# Patient Record
Sex: MALE | Race: WHITE | NOT HISPANIC OR LATINO | Employment: UNEMPLOYED | URBAN - METROPOLITAN AREA
[De-identification: names, ages, dates, MRNs, and addresses within clinical notes are randomized per-mention and may not be internally consistent; named-entity substitution may affect disease eponyms.]

---

## 2017-01-04 ENCOUNTER — HOSPITAL ENCOUNTER (EMERGENCY)
Facility: HOSPITAL | Age: 32
Discharge: HOME | End: 2017-01-04
Attending: EMERGENCY MEDICINE
Payer: COMMERCIAL

## 2017-01-04 VITALS
HEART RATE: 73 BPM | DIASTOLIC BLOOD PRESSURE: 74 MMHG | OXYGEN SATURATION: 98 % | TEMPERATURE: 98 F | RESPIRATION RATE: 16 BRPM | WEIGHT: 160 LBS | BODY MASS INDEX: 21.67 KG/M2 | HEIGHT: 72 IN | SYSTOLIC BLOOD PRESSURE: 128 MMHG

## 2017-01-04 DIAGNOSIS — R07.89 ATYPICAL CHEST PAIN: Primary | ICD-10-CM

## 2017-01-04 PROCEDURE — 99285 EMERGENCY DEPT VISIT HI MDM: CPT

## 2017-01-04 PROCEDURE — 93005 ELECTROCARDIOGRAM TRACING: CPT

## 2017-01-05 LAB
ATRIAL RATE: 79 BPM
P AXIS: 52 DEGREES
PR INTERVAL: 130 MS
QRS AXIS: 86 DEGREES
QRSD INTERVAL: 84 MS
QT INTERVAL: 402 MS
QTC INTERVAL: 460 MS
T WAVE AXIS: 72 DEGREES
VENTRICULAR RATE: 79 BPM

## 2017-01-23 ENCOUNTER — APPOINTMENT (EMERGENCY)
Dept: RADIOLOGY | Facility: HOSPITAL | Age: 32
End: 2017-01-23
Payer: COMMERCIAL

## 2017-01-23 ENCOUNTER — HOSPITAL ENCOUNTER (EMERGENCY)
Facility: HOSPITAL | Age: 32
Discharge: HOME | End: 2017-01-25
Attending: EMERGENCY MEDICINE | Admitting: EMERGENCY MEDICINE
Payer: COMMERCIAL

## 2017-01-23 DIAGNOSIS — F19.10 DRUG ABUSE (HCC): ICD-10-CM

## 2017-01-23 DIAGNOSIS — R45.851 SUICIDAL IDEATION: Primary | ICD-10-CM

## 2017-01-23 LAB
AMPHETAMINES SERPL QL SCN: NEGATIVE
ANION GAP SERPL CALCULATED.3IONS-SCNC: 9 MMOL/L (ref 4–13)
BARBITURATES UR QL: NEGATIVE
BASOPHILS # BLD AUTO: 0 THOUSANDS/ΜL (ref 0–0.1)
BASOPHILS NFR BLD AUTO: 1 % (ref 0–1)
BENZODIAZ UR QL: NEGATIVE
BUN SERPL-MCNC: 13 MG/DL (ref 5–25)
CALCIUM SERPL-MCNC: 8.4 MG/DL (ref 8.3–10.1)
CHLORIDE SERPL-SCNC: 104 MMOL/L (ref 100–108)
CO2 SERPL-SCNC: 30 MMOL/L (ref 21–32)
COCAINE UR QL: POSITIVE
CREAT SERPL-MCNC: 0.84 MG/DL (ref 0.6–1.3)
EOSINOPHIL # BLD AUTO: 0.2 THOUSAND/ΜL (ref 0–0.61)
EOSINOPHIL NFR BLD AUTO: 3 % (ref 0–6)
ERYTHROCYTE [DISTWIDTH] IN BLOOD BY AUTOMATED COUNT: 13.4 % (ref 11.6–15.1)
ETHANOL SERPL-MCNC: <3 MG/DL (ref 0–3)
GFR SERPL CREATININE-BSD FRML MDRD: >60 ML/MIN/1.73SQ M
GLUCOSE SERPL-MCNC: 108 MG/DL (ref 65–140)
HCT VFR BLD AUTO: 34.3 % (ref 42–52)
HGB BLD-MCNC: 11.4 G/DL (ref 14–18)
LYMPHOCYTES # BLD AUTO: 2.1 THOUSANDS/ΜL (ref 0.6–4.47)
LYMPHOCYTES NFR BLD AUTO: 39 % (ref 14–44)
MCH RBC QN AUTO: 29.4 PG (ref 27–31)
MCHC RBC AUTO-ENTMCNC: 33.1 G/DL (ref 31.4–37.4)
MCV RBC AUTO: 89 FL (ref 82–98)
METHADONE UR QL: NEGATIVE
MONOCYTES # BLD AUTO: 0.4 THOUSAND/ΜL (ref 0.17–1.22)
MONOCYTES NFR BLD AUTO: 8 % (ref 4–12)
NEUTROPHILS # BLD AUTO: 2.6 THOUSANDS/ΜL (ref 1.85–7.62)
NEUTS SEG NFR BLD AUTO: 49 % (ref 43–75)
NRBC BLD AUTO-RTO: 0 /100 WBCS
OPIATES UR QL SCN: POSITIVE
PCP UR QL: NEGATIVE
PLATELET # BLD AUTO: 167 THOUSANDS/UL (ref 130–400)
PMV BLD AUTO: 8.4 FL (ref 8.9–12.7)
POTASSIUM SERPL-SCNC: 3.6 MMOL/L (ref 3.5–5.3)
RBC # BLD AUTO: 3.86 MILLION/UL (ref 4.7–6.1)
SODIUM SERPL-SCNC: 143 MMOL/L (ref 136–145)
THC UR QL: NEGATIVE
WBC # BLD AUTO: 5.3 THOUSAND/UL (ref 4.8–10.8)

## 2017-01-23 PROCEDURE — 80320 DRUG SCREEN QUANTALCOHOLS: CPT | Performed by: EMERGENCY MEDICINE

## 2017-01-23 PROCEDURE — 93005 ELECTROCARDIOGRAM TRACING: CPT | Performed by: EMERGENCY MEDICINE

## 2017-01-23 PROCEDURE — 71020 HB CHEST X-RAY 2VW FRONTAL&LATL: CPT

## 2017-01-23 PROCEDURE — 36415 COLL VENOUS BLD VENIPUNCTURE: CPT | Performed by: EMERGENCY MEDICINE

## 2017-01-23 PROCEDURE — 85025 COMPLETE CBC W/AUTO DIFF WBC: CPT | Performed by: EMERGENCY MEDICINE

## 2017-01-23 PROCEDURE — 80307 DRUG TEST PRSMV CHEM ANLYZR: CPT | Performed by: EMERGENCY MEDICINE

## 2017-01-23 PROCEDURE — 80048 BASIC METABOLIC PNL TOTAL CA: CPT | Performed by: EMERGENCY MEDICINE

## 2017-01-23 RX ORDER — NICOTINE 21 MG/24HR
PATCH, TRANSDERMAL 24 HOURS TRANSDERMAL
Status: COMPLETED
Start: 2017-01-23 | End: 2017-01-24

## 2017-01-23 RX ORDER — NICOTINE 21 MG/24HR
21 PATCH, TRANSDERMAL 24 HOURS TRANSDERMAL ONCE
Status: COMPLETED | OUTPATIENT
Start: 2017-01-23 | End: 2017-01-24

## 2017-01-23 RX ADMIN — Medication 21 MG: at 19:25

## 2017-01-23 RX ADMIN — CLONIDINE 0.3 MG: 0.3 PATCH, EXTENDED RELEASE TRANSDERMAL at 19:21

## 2017-01-23 RX ADMIN — NICOTINE 21 MG: 21 PATCH, EXTENDED RELEASE TRANSDERMAL at 19:25

## 2017-01-24 LAB
ATRIAL RATE: 90 BPM
P AXIS: 56 DEGREES
PR INTERVAL: 138 MS
QRS AXIS: 76 DEGREES
QRSD INTERVAL: 82 MS
QT INTERVAL: 364 MS
QTC INTERVAL: 445 MS
T WAVE AXIS: 63 DEGREES
VENTRICULAR RATE: 90 BPM

## 2017-01-24 RX ORDER — NICOTINE 21 MG/24HR
21 PATCH, TRANSDERMAL 24 HOURS TRANSDERMAL ONCE
Status: DISCONTINUED | OUTPATIENT
Start: 2017-01-24 | End: 2017-01-25 | Stop reason: HOSPADM

## 2017-01-24 RX ADMIN — NICOTINE 21 MG: 21 PATCH, EXTENDED RELEASE TRANSDERMAL at 22:20

## 2017-01-25 VITALS
HEART RATE: 72 BPM | SYSTOLIC BLOOD PRESSURE: 132 MMHG | BODY MASS INDEX: 22.35 KG/M2 | WEIGHT: 165 LBS | DIASTOLIC BLOOD PRESSURE: 85 MMHG | TEMPERATURE: 97.5 F | OXYGEN SATURATION: 98 % | HEIGHT: 72 IN | RESPIRATION RATE: 18 BRPM

## 2017-01-25 PROCEDURE — 99285 EMERGENCY DEPT VISIT HI MDM: CPT

## 2017-02-15 ENCOUNTER — HOSPITAL ENCOUNTER (EMERGENCY)
Facility: HOSPITAL | Age: 32
Discharge: HOME | End: 2017-02-15
Admitting: EMERGENCY MEDICINE
Payer: COMMERCIAL

## 2017-02-15 VITALS
BODY MASS INDEX: 25.09 KG/M2 | WEIGHT: 185 LBS | TEMPERATURE: 98.2 F | HEART RATE: 100 BPM | RESPIRATION RATE: 16 BRPM | DIASTOLIC BLOOD PRESSURE: 87 MMHG | SYSTOLIC BLOOD PRESSURE: 135 MMHG | OXYGEN SATURATION: 98 %

## 2017-02-15 DIAGNOSIS — S02.5XXA TOOTH FRACTURE: Primary | ICD-10-CM

## 2017-02-15 PROCEDURE — 99281 EMR DPT VST MAYX REQ PHY/QHP: CPT

## 2017-02-15 RX ORDER — NAPROXEN 500 MG/1
500 TABLET ORAL 2 TIMES DAILY WITH MEALS
Qty: 20 TABLET | Refills: 0 | Status: SHIPPED | OUTPATIENT
Start: 2017-02-15 | End: 2017-08-03 | Stop reason: ALTCHOICE

## 2017-02-15 RX ORDER — CLINDAMYCIN HYDROCHLORIDE 300 MG/1
300 CAPSULE ORAL 3 TIMES DAILY
Qty: 21 CAPSULE | Refills: 0 | Status: SHIPPED | OUTPATIENT
Start: 2017-02-15 | End: 2017-02-22

## 2017-02-15 RX ORDER — TRAMADOL HYDROCHLORIDE 50 MG/1
50 TABLET ORAL EVERY 8 HOURS PRN
Qty: 15 TABLET | Refills: 0 | Status: SHIPPED | OUTPATIENT
Start: 2017-02-15 | End: 2017-02-20

## 2017-03-01 ENCOUNTER — HOSPITAL ENCOUNTER (EMERGENCY)
Facility: HOSPITAL | Age: 32
Discharge: HOME | End: 2017-03-01
Attending: EMERGENCY MEDICINE | Admitting: EMERGENCY MEDICINE
Payer: COMMERCIAL

## 2017-03-01 ENCOUNTER — APPOINTMENT (EMERGENCY)
Dept: RADIOLOGY | Facility: HOSPITAL | Age: 32
End: 2017-03-01
Payer: COMMERCIAL

## 2017-03-01 VITALS
HEIGHT: 72 IN | TEMPERATURE: 97.9 F | DIASTOLIC BLOOD PRESSURE: 82 MMHG | OXYGEN SATURATION: 98 % | BODY MASS INDEX: 24.38 KG/M2 | SYSTOLIC BLOOD PRESSURE: 148 MMHG | WEIGHT: 180 LBS | RESPIRATION RATE: 18 BRPM | HEART RATE: 84 BPM

## 2017-03-01 DIAGNOSIS — R10.9 ABDOMINAL PAIN: Primary | ICD-10-CM

## 2017-03-01 DIAGNOSIS — K59.00 CONSTIPATION: ICD-10-CM

## 2017-03-01 LAB
ALBUMIN SERPL BCP-MCNC: 4.6 G/DL (ref 3.5–5)
ALP SERPL-CCNC: 55 U/L (ref 46–116)
ALT SERPL W P-5'-P-CCNC: 130 U/L (ref 12–78)
ANION GAP SERPL CALCULATED.3IONS-SCNC: 12 MMOL/L (ref 4–13)
AST SERPL W P-5'-P-CCNC: 48 U/L (ref 5–45)
BASOPHILS # BLD AUTO: 0.1 THOUSANDS/ΜL (ref 0–0.1)
BASOPHILS NFR BLD AUTO: 2 % (ref 0–1)
BILIRUB SERPL-MCNC: 0.7 MG/DL (ref 0.2–1)
BUN SERPL-MCNC: 15 MG/DL (ref 5–25)
CALCIUM SERPL-MCNC: 9.2 MG/DL (ref 8.3–10.1)
CHLORIDE SERPL-SCNC: 102 MMOL/L (ref 100–108)
CLARITY, POC: CLEAR
CO2 SERPL-SCNC: 29 MMOL/L (ref 21–32)
COLOR, POC: YELLOW
CREAT SERPL-MCNC: 1.02 MG/DL (ref 0.6–1.3)
EOSINOPHIL # BLD AUTO: 0.1 THOUSAND/ΜL (ref 0–0.61)
EOSINOPHIL NFR BLD AUTO: 1 % (ref 0–6)
ERYTHROCYTE [DISTWIDTH] IN BLOOD BY AUTOMATED COUNT: 13.8 % (ref 11.6–15.1)
EXT BLOOD URINE: NEGATIVE
EXT GLUCOSE, UA: NORMAL
EXT KETONES: NEGATIVE
EXT NITRITE, UA: NEGATIVE
EXT PH, UA: 6
EXT PROTEIN, UA: NEGATIVE
GFR SERPL CREATININE-BSD FRML MDRD: >60 ML/MIN/1.73SQ M
GLUCOSE SERPL-MCNC: 95 MG/DL (ref 65–140)
HCT VFR BLD AUTO: 40.5 % (ref 42–52)
HGB BLD-MCNC: 13.6 G/DL (ref 14–18)
LACTATE SERPL-SCNC: 0.7 MMOL/L (ref 0.5–2)
LYMPHOCYTES # BLD AUTO: 2.1 THOUSANDS/ΜL (ref 0.6–4.47)
LYMPHOCYTES NFR BLD AUTO: 25 % (ref 14–44)
MCH RBC QN AUTO: 30.1 PG (ref 27–31)
MCHC RBC AUTO-ENTMCNC: 33.6 G/DL (ref 31.4–37.4)
MCV RBC AUTO: 90 FL (ref 82–98)
MONOCYTES # BLD AUTO: 0.5 THOUSAND/ΜL (ref 0.17–1.22)
MONOCYTES NFR BLD AUTO: 6 % (ref 4–12)
NEUTROPHILS # BLD AUTO: 5.6 THOUSANDS/ΜL (ref 1.85–7.62)
NEUTS SEG NFR BLD AUTO: 67 % (ref 43–75)
PLATELET # BLD AUTO: 208 THOUSANDS/UL (ref 130–400)
PMV BLD AUTO: 7.9 FL (ref 8.9–12.7)
POTASSIUM SERPL-SCNC: 3.5 MMOL/L (ref 3.5–5.3)
PROT SERPL-MCNC: 7.7 G/DL (ref 6.4–8.2)
RBC # BLD AUTO: 4.53 MILLION/UL (ref 4.7–6.1)
SODIUM SERPL-SCNC: 143 MMOL/L (ref 136–145)
WBC # BLD AUTO: 8.4 THOUSAND/UL (ref 4.8–10.8)
WBC # BLD EST: NEGATIVE 10*3/UL

## 2017-03-01 PROCEDURE — 81002 URINALYSIS NONAUTO W/O SCOPE: CPT | Performed by: EMERGENCY MEDICINE

## 2017-03-01 PROCEDURE — 96376 TX/PRO/DX INJ SAME DRUG ADON: CPT

## 2017-03-01 PROCEDURE — 83605 ASSAY OF LACTIC ACID: CPT | Performed by: EMERGENCY MEDICINE

## 2017-03-01 PROCEDURE — 74177 CT ABD & PELVIS W/CONTRAST: CPT

## 2017-03-01 PROCEDURE — 85025 COMPLETE CBC W/AUTO DIFF WBC: CPT | Performed by: EMERGENCY MEDICINE

## 2017-03-01 PROCEDURE — 96375 TX/PRO/DX INJ NEW DRUG ADDON: CPT

## 2017-03-01 PROCEDURE — 80053 COMPREHEN METABOLIC PANEL: CPT | Performed by: EMERGENCY MEDICINE

## 2017-03-01 PROCEDURE — 96374 THER/PROPH/DIAG INJ IV PUSH: CPT

## 2017-03-01 PROCEDURE — 74176 CT ABD & PELVIS W/O CONTRAST: CPT

## 2017-03-01 PROCEDURE — 99284 EMERGENCY DEPT VISIT MOD MDM: CPT

## 2017-03-01 PROCEDURE — 36415 COLL VENOUS BLD VENIPUNCTURE: CPT | Performed by: EMERGENCY MEDICINE

## 2017-03-01 RX ORDER — ONDANSETRON 2 MG/ML
4 INJECTION INTRAMUSCULAR; INTRAVENOUS ONCE
Status: COMPLETED | OUTPATIENT
Start: 2017-03-01 | End: 2017-03-01

## 2017-03-01 RX ORDER — POLYETHYLENE GLYCOL 3350 17 G/17G
17 POWDER, FOR SOLUTION ORAL DAILY
Qty: 510 G | Refills: 0 | Status: SHIPPED | OUTPATIENT
Start: 2017-03-01 | End: 2017-08-03 | Stop reason: ALTCHOICE

## 2017-03-01 RX ORDER — KETOROLAC TROMETHAMINE 30 MG/ML
30 INJECTION, SOLUTION INTRAMUSCULAR; INTRAVENOUS ONCE
Status: COMPLETED | OUTPATIENT
Start: 2017-03-01 | End: 2017-03-01

## 2017-03-01 RX ORDER — HYDROCHLOROTHIAZIDE 25 MG/1
25 TABLET ORAL DAILY
COMMUNITY
End: 2017-08-03 | Stop reason: HOSPADM

## 2017-03-01 RX ORDER — MORPHINE SULFATE 4 MG/ML
4 INJECTION, SOLUTION INTRAMUSCULAR; INTRAVENOUS ONCE
Status: COMPLETED | OUTPATIENT
Start: 2017-03-01 | End: 2017-03-01

## 2017-03-01 RX ADMIN — HYDROMORPHONE HYDROCHLORIDE 1 MG: 1 INJECTION, SOLUTION INTRAMUSCULAR; INTRAVENOUS; SUBCUTANEOUS at 03:30

## 2017-03-01 RX ADMIN — KETOROLAC TROMETHAMINE 30 MG: 30 INJECTION, SOLUTION INTRAMUSCULAR at 02:04

## 2017-03-01 RX ADMIN — MORPHINE SULFATE 4 MG: 4 INJECTION, SOLUTION INTRAMUSCULAR; INTRAVENOUS at 02:57

## 2017-03-01 RX ADMIN — ONDANSETRON 4 MG: 2 INJECTION INTRAMUSCULAR; INTRAVENOUS at 02:04

## 2017-03-01 RX ADMIN — IOHEXOL 100 ML: 350 INJECTION, SOLUTION INTRAVENOUS at 05:41

## 2017-03-01 RX ADMIN — ONDANSETRON 4 MG: 2 INJECTION INTRAMUSCULAR; INTRAVENOUS at 03:30

## 2017-03-01 RX ADMIN — IOHEXOL 50 ML: 240 INJECTION, SOLUTION INTRATHECAL; INTRAVASCULAR; INTRAVENOUS; ORAL at 03:41

## 2017-03-05 ENCOUNTER — HOSPITAL ENCOUNTER (EMERGENCY)
Facility: HOSPITAL | Age: 32
Discharge: HOME | End: 2017-03-05
Admitting: EMERGENCY MEDICINE
Payer: COMMERCIAL

## 2017-03-05 VITALS
HEART RATE: 115 BPM | SYSTOLIC BLOOD PRESSURE: 143 MMHG | TEMPERATURE: 96.5 F | OXYGEN SATURATION: 100 % | BODY MASS INDEX: 24.41 KG/M2 | RESPIRATION RATE: 20 BRPM | DIASTOLIC BLOOD PRESSURE: 93 MMHG | WEIGHT: 180 LBS

## 2017-03-05 DIAGNOSIS — S02.5XXA: Primary | ICD-10-CM

## 2017-03-05 PROCEDURE — 99282 EMERGENCY DEPT VISIT SF MDM: CPT

## 2017-03-05 RX ORDER — NAPROXEN 500 MG/1
500 TABLET ORAL 2 TIMES DAILY WITH MEALS
Qty: 20 TABLET | Refills: 0 | Status: SHIPPED | OUTPATIENT
Start: 2017-03-05 | End: 2017-08-03 | Stop reason: ALTCHOICE

## 2017-03-05 RX ORDER — TRAMADOL HYDROCHLORIDE 50 MG/1
50 TABLET ORAL EVERY 8 HOURS PRN
Qty: 15 TABLET | Refills: 0 | Status: SHIPPED | OUTPATIENT
Start: 2017-03-05 | End: 2017-03-10

## 2017-03-05 RX ORDER — CLINDAMYCIN HYDROCHLORIDE 300 MG/1
300 CAPSULE ORAL 3 TIMES DAILY
Qty: 30 CAPSULE | Refills: 0 | Status: SHIPPED | OUTPATIENT
Start: 2017-03-05 | End: 2017-03-15

## 2017-03-07 ENCOUNTER — ALLSCRIPTS OFFICE VISIT (OUTPATIENT)
Dept: OTHER | Facility: OTHER | Age: 32
End: 2017-03-07

## 2017-04-07 ENCOUNTER — GENERIC CONVERSION - ENCOUNTER (OUTPATIENT)
Dept: OTHER | Facility: OTHER | Age: 32
End: 2017-04-07

## 2017-08-03 ENCOUNTER — HOSPITAL ENCOUNTER (EMERGENCY)
Facility: HOSPITAL | Age: 32
Discharge: HOME | End: 2017-08-03
Attending: EMERGENCY MEDICINE | Admitting: EMERGENCY MEDICINE
Payer: COMMERCIAL

## 2017-08-03 ENCOUNTER — APPOINTMENT (EMERGENCY)
Dept: RADIOLOGY | Facility: HOSPITAL | Age: 32
End: 2017-08-03
Payer: COMMERCIAL

## 2017-08-03 VITALS
HEART RATE: 61 BPM | TEMPERATURE: 98 F | DIASTOLIC BLOOD PRESSURE: 72 MMHG | BODY MASS INDEX: 23.7 KG/M2 | RESPIRATION RATE: 18 BRPM | SYSTOLIC BLOOD PRESSURE: 130 MMHG | HEIGHT: 72 IN | OXYGEN SATURATION: 97 % | WEIGHT: 175 LBS

## 2017-08-03 DIAGNOSIS — R07.9 CHEST PAIN: Primary | ICD-10-CM

## 2017-08-03 LAB
ANION GAP SERPL CALCULATED.3IONS-SCNC: 8 MMOL/L (ref 4–13)
APTT PPP: 26 SECONDS (ref 24–33)
BASOPHILS # BLD AUTO: 0 THOUSANDS/ΜL (ref 0–0.1)
BASOPHILS NFR BLD AUTO: 0 % (ref 0–1)
BUN SERPL-MCNC: 10 MG/DL (ref 5–25)
CALCIUM SERPL-MCNC: 9.3 MG/DL (ref 8.3–10.1)
CHLORIDE SERPL-SCNC: 104 MMOL/L (ref 100–108)
CO2 SERPL-SCNC: 30 MMOL/L (ref 21–32)
CREAT SERPL-MCNC: 0.77 MG/DL (ref 0.6–1.3)
DEPRECATED D DIMER PPP: <190 NG/ML (FEU) (ref 190–520)
EOSINOPHIL # BLD AUTO: 0.1 THOUSAND/ΜL (ref 0–0.61)
EOSINOPHIL NFR BLD AUTO: 2 % (ref 0–6)
ERYTHROCYTE [DISTWIDTH] IN BLOOD BY AUTOMATED COUNT: 13.3 % (ref 11.6–15.1)
GFR SERPL CREATININE-BSD FRML MDRD: 122 ML/MIN/1.73SQ M
GLUCOSE SERPL-MCNC: 103 MG/DL (ref 65–140)
HCT VFR BLD AUTO: 39.5 % (ref 42–52)
HGB BLD-MCNC: 13.2 G/DL (ref 14–18)
INR PPP: 1.06 (ref 0.86–1.16)
LYMPHOCYTES # BLD AUTO: 1.1 THOUSANDS/ΜL (ref 0.6–4.47)
LYMPHOCYTES NFR BLD AUTO: 19 % (ref 14–44)
MCH RBC QN AUTO: 30.2 PG (ref 27–31)
MCHC RBC AUTO-ENTMCNC: 33.5 G/DL (ref 31.4–37.4)
MCV RBC AUTO: 90 FL (ref 82–98)
MONOCYTES # BLD AUTO: 0.3 THOUSAND/ΜL (ref 0.17–1.22)
MONOCYTES NFR BLD AUTO: 6 % (ref 4–12)
NEUTROPHILS # BLD AUTO: 4.3 THOUSANDS/ΜL (ref 1.85–7.62)
NEUTS SEG NFR BLD AUTO: 73 % (ref 43–75)
NRBC BLD AUTO-RTO: 0 /100 WBCS
PLATELET # BLD AUTO: 157 THOUSANDS/UL (ref 130–400)
PMV BLD AUTO: 8 FL (ref 8.9–12.7)
POTASSIUM SERPL-SCNC: 4.8 MMOL/L (ref 3.5–5.3)
PROTHROMBIN TIME: 11.1 SECONDS (ref 9.4–11.7)
RBC # BLD AUTO: 4.38 MILLION/UL (ref 4.7–6.1)
SODIUM SERPL-SCNC: 142 MMOL/L (ref 136–145)
TROPONIN I SERPL-MCNC: <0.02 NG/ML
TROPONIN I SERPL-MCNC: <0.02 NG/ML
WBC # BLD AUTO: 5.9 THOUSAND/UL (ref 4.8–10.8)

## 2017-08-03 PROCEDURE — 36415 COLL VENOUS BLD VENIPUNCTURE: CPT | Performed by: EMERGENCY MEDICINE

## 2017-08-03 PROCEDURE — 93005 ELECTROCARDIOGRAM TRACING: CPT

## 2017-08-03 PROCEDURE — 85379 FIBRIN DEGRADATION QUANT: CPT | Performed by: EMERGENCY MEDICINE

## 2017-08-03 PROCEDURE — 85610 PROTHROMBIN TIME: CPT | Performed by: EMERGENCY MEDICINE

## 2017-08-03 PROCEDURE — 80048 BASIC METABOLIC PNL TOTAL CA: CPT | Performed by: EMERGENCY MEDICINE

## 2017-08-03 PROCEDURE — 99285 EMERGENCY DEPT VISIT HI MDM: CPT

## 2017-08-03 PROCEDURE — 85025 COMPLETE CBC W/AUTO DIFF WBC: CPT | Performed by: EMERGENCY MEDICINE

## 2017-08-03 PROCEDURE — 84484 ASSAY OF TROPONIN QUANT: CPT | Performed by: EMERGENCY MEDICINE

## 2017-08-03 PROCEDURE — 85730 THROMBOPLASTIN TIME PARTIAL: CPT | Performed by: EMERGENCY MEDICINE

## 2017-08-03 PROCEDURE — 71010 HB CHEST X-RAY 1 VIEW FRONTAL (PORTABLE): CPT

## 2017-08-03 RX ORDER — METHADONE HYDROCHLORIDE 10 MG/ML
100 CONCENTRATE ORAL DAILY
COMMUNITY
End: 2019-07-11

## 2017-08-03 RX ORDER — ACETAMINOPHEN 325 MG/1
650 TABLET ORAL ONCE
Status: COMPLETED | OUTPATIENT
Start: 2017-08-03 | End: 2017-08-03

## 2017-08-03 RX ADMIN — ACETAMINOPHEN 650 MG: 325 TABLET, FILM COATED ORAL at 17:27

## 2017-08-09 LAB
ATRIAL RATE: 67 BPM
P AXIS: 63 DEGREES
PR INTERVAL: 144 MS
QRS AXIS: 80 DEGREES
QRSD INTERVAL: 90 MS
QT INTERVAL: 432 MS
QTC INTERVAL: 456 MS
T WAVE AXIS: 67 DEGREES
VENTRICULAR RATE: 67 BPM

## 2017-08-18 ENCOUNTER — GENERIC CONVERSION - ENCOUNTER (OUTPATIENT)
Dept: OTHER | Facility: OTHER | Age: 32
End: 2017-08-18

## 2017-11-22 ENCOUNTER — GENERIC CONVERSION - ENCOUNTER (OUTPATIENT)
Dept: OTHER | Facility: OTHER | Age: 32
End: 2017-11-22

## 2018-01-13 VITALS
OXYGEN SATURATION: 97 % | WEIGHT: 177 LBS | SYSTOLIC BLOOD PRESSURE: 116 MMHG | TEMPERATURE: 98.6 F | DIASTOLIC BLOOD PRESSURE: 62 MMHG | BODY MASS INDEX: 23.98 KG/M2 | HEIGHT: 72 IN | RESPIRATION RATE: 18 BRPM | HEART RATE: 73 BPM

## 2018-01-15 NOTE — MISCELLANEOUS
Provider Comments  Provider Comments:   Attempted to call pt re: missed apt   No answering machine to L/M  CE      Signatures   Electronically signed by : Kim Kilpatrick MD; Aug 18 2017  1:52PM EST                       (Author)

## 2018-01-17 NOTE — MISCELLANEOUS
Provider Comments  Provider Comments:   called to resched not able to leave message  lr      Signatures   Electronically signed by : FRANSISCO Khan ; Apr 12 2017  9:04PM EST                       (Author)

## 2018-01-22 VITALS
TEMPERATURE: 97.2 F | HEART RATE: 85 BPM | OXYGEN SATURATION: 97 % | WEIGHT: 173 LBS | BODY MASS INDEX: 23.43 KG/M2 | RESPIRATION RATE: 18 BRPM | SYSTOLIC BLOOD PRESSURE: 108 MMHG | DIASTOLIC BLOOD PRESSURE: 60 MMHG | HEIGHT: 72 IN

## 2018-09-11 ENCOUNTER — HOSPITAL ENCOUNTER (EMERGENCY)
Facility: HOSPITAL | Age: 33
Discharge: HOME | End: 2018-09-11
Attending: EMERGENCY MEDICINE | Admitting: EMERGENCY MEDICINE
Payer: COMMERCIAL

## 2018-09-11 VITALS
HEART RATE: 94 BPM | OXYGEN SATURATION: 99 % | SYSTOLIC BLOOD PRESSURE: 136 MMHG | RESPIRATION RATE: 16 BRPM | WEIGHT: 168 LBS | DIASTOLIC BLOOD PRESSURE: 77 MMHG | TEMPERATURE: 98.6 F | BODY MASS INDEX: 22.78 KG/M2

## 2018-09-11 DIAGNOSIS — Z76.0 MEDICATION REFILL: Primary | ICD-10-CM

## 2018-09-11 PROCEDURE — 99281 EMR DPT VST MAYX REQ PHY/QHP: CPT

## 2018-09-11 RX ORDER — QUETIAPINE FUMARATE 25 MG/1
50 TABLET, FILM COATED ORAL ONCE
Status: COMPLETED | OUTPATIENT
Start: 2018-09-11 | End: 2018-09-11

## 2018-09-11 RX ORDER — BUPROPION HYDROCHLORIDE 100 MG/1
100 TABLET ORAL 2 TIMES DAILY
Qty: 28 TABLET | Refills: 0 | Status: SHIPPED | OUTPATIENT
Start: 2018-09-11 | End: 2019-07-11

## 2018-09-11 RX ORDER — QUETIAPINE FUMARATE 50 MG/1
50 TABLET, FILM COATED ORAL
Qty: 14 TABLET | Refills: 0 | Status: SHIPPED | OUTPATIENT
Start: 2018-09-11 | End: 2019-07-11

## 2018-09-11 RX ORDER — BUPROPION HYDROCHLORIDE 100 MG/1
100 TABLET ORAL ONCE
Status: COMPLETED | OUTPATIENT
Start: 2018-09-11 | End: 2018-09-11

## 2018-09-11 RX ADMIN — BUPROPION HYDROCHLORIDE 100 MG: 100 TABLET, FILM COATED ORAL at 19:00

## 2018-09-11 RX ADMIN — QUETIAPINE FUMARATE 50 MG: 25 TABLET ORAL at 19:00

## 2018-09-11 NOTE — ED PROVIDER NOTES
History  Chief Complaint   Patient presents with    Medication Refill     Patient states he got out of [facility] about 1 week ago, ws on wellbutrin and seraquel at the [facility] but doesn't have any     Patient with history of ADHD, psychiatric disorders presents for evaluation for medication refill  Patient discharged from [facility] roughly 4 days ago  Sent home without medications  Denies any new or changing symptoms  Endorses mild anxiety but no suicidal homicidal ideation  No aggravating or alleviating factors  Denies any physical complaints  History provided by:  Patient and relative   used: No        Prior to Admission Medications   Prescriptions Last Dose Informant Patient Reported? Taking? methadone (DOLOPHINE) 10 mg/mL oral concentrated solution   Yes No   Sig: Take 100 mg by mouth daily      Facility-Administered Medications: None       Past Medical History:   Diagnosis Date    ADHD (attention deficit hyperactivity disorder)     Hypertension     Inguinal hernia     Psychiatric disorder     anxiety, depression, social personality diorder       History reviewed  No pertinent surgical history  History reviewed  No pertinent family history  I have reviewed and agree with the history as documented  Social History   Substance Use Topics    Smoking status: Current Every Day Smoker     Packs/day: 1 00     Types: Cigarettes    Smokeless tobacco: Never Used    Alcohol use No        Review of Systems   Constitutional: Negative for activity change, appetite change, chills, fatigue and fever  HENT: Negative for congestion, dental problem, facial swelling, sore throat, tinnitus and trouble swallowing  Eyes: Negative for pain, discharge and itching  Respiratory: Negative for apnea, chest tightness and wheezing  Cardiovascular: Negative for chest pain, palpitations and leg swelling  Gastrointestinal: Negative for abdominal pain and nausea     Genitourinary: Negative for difficulty urinating, dysuria and flank pain  Musculoskeletal: Negative for arthralgias, back pain, gait problem, joint swelling and neck pain  Skin: Negative for color change, rash and wound  Neurological: Negative for dizziness and facial asymmetry  Psychiatric/Behavioral: Negative for agitation and behavioral problems  The patient is not nervous/anxious  All other systems reviewed and are negative  Physical Exam  Physical Exam   Constitutional: He is oriented to person, place, and time  He appears well-developed and well-nourished  No distress  HENT:   Head: Normocephalic and atraumatic  Right Ear: External ear normal    Left Ear: External ear normal    Eyes: EOM are normal  Pupils are equal, round, and reactive to light  Right eye exhibits no discharge  Left eye exhibits no discharge  Neck: Normal range of motion  Neck supple  No tracheal deviation present  No thyromegaly present  Cardiovascular: Normal rate and regular rhythm  No murmur heard  Pulmonary/Chest: Effort normal and breath sounds normal    Abdominal: Soft  Bowel sounds are normal  He exhibits no distension  There is no tenderness  Musculoskeletal: Normal range of motion  He exhibits no edema or deformity  Neurological: He is alert and oriented to person, place, and time  No cranial nerve deficit  He exhibits normal muscle tone  Skin: Skin is warm  Capillary refill takes less than 2 seconds  He is not diaphoretic  Psychiatric: He has a normal mood and affect  His behavior is normal    Nursing note and vitals reviewed        Vital Signs  ED Triage Vitals [09/11/18 1814]   Temperature Pulse Respirations Blood Pressure SpO2   98 6 °F (37 °C) 94 16 136/77 99 %      Temp Source Heart Rate Source Patient Position - Orthostatic VS BP Location FiO2 (%)   Tympanic -- Sitting Right arm --      Pain Score       No Pain           Vitals:    09/11/18 1814   BP: 136/77   Pulse: 94   Patient Position - Orthostatic VS: Sitting Visual Acuity      ED Medications  Medications   buPROPion (WELLBUTRIN) tablet 100 mg (100 mg Oral Given 9/11/18 1900)   QUEtiapine (SEROquel) tablet 50 mg (50 mg Oral Given 9/11/18 1900)       Diagnostic Studies  Results Reviewed     None                 No orders to display              Procedures  Procedures       Phone Contacts  ED Phone Contact    ED Course                               MDM  Number of Diagnoses or Management Options  Medication refill: new and does not require workup  Diagnosis management comments: Patient with no acute medical or psychiatric issues  Home medications ordered in ED, short course prescription given for Wellbutrin, Seroquel  Risk of Complications, Morbidity, and/or Mortality  Presenting problems: low  Diagnostic procedures: low  Management options: low    Patient Progress  Patient progress: stable    CritCare Time    Disposition  Final diagnoses:   Medication refill     Time reflects when diagnosis was documented in both MDM as applicable and the Disposition within this note     Time User Action Codes Description Comment    9/11/2018  7:02 PM Yasmine Mccrary Add [Z76 0] Medication refill       ED Disposition     ED Disposition Condition Comment    Discharge  [facility] discharge to home/self care      Condition at discharge: Good        Follow-up Information     Follow up With Specialties Details [facility] [address], [address], Internal Medicine Schedule an appointment as soon as possible for a visit in 1 week As needed, If symptoms worsen [address]  [facility]  [phone]            Discharge Medication List as of 9/11/2018  7:02 PM      START taking these medications    Details   buPROPion (WELLBUTRIN) 100 mg tablet Take 1 tablet (100 mg total) by mouth 2 (two) times a day for 14 days, Starting Tue 9/11/2018, Until Tue 9/25/2018, Print      QUEtiapine (SEROquel) 50 mg tablet Take 1 tablet (50 mg total) by mouth daily at bedtime for 14 days, Starting Tue 9/11/2018, Until Tue 9/25/2018, Print         CONTINUE these medications which have NOT CHANGED    Details   methadone (DOLOPHINE) 10 mg/mL oral concentrated solution Take 100 mg by mouth daily, Historical Med           No discharge procedures on file      ED Provider  Electronically Signed by           Lori Aburto MD  09/11/18 9442

## 2018-09-14 ENCOUNTER — VBI (OUTPATIENT)
Dept: FAMILY MEDICINE CLINIC | Facility: CLINIC | Age: 33
End: 2018-09-14

## 2018-09-14 NOTE — TELEPHONE ENCOUNTER
Pt was seen in [address] [place] [address] Drive on 9/11/18  CC: Medication Refill  DX: Medication refill   Left message, informed of dr on call, office hours and phone number

## 2019-07-11 ENCOUNTER — HOSPITAL ENCOUNTER (EMERGENCY)
Facility: HOSPITAL | Age: 34
Discharge: HOME | End: 2019-07-11
Attending: EMERGENCY MEDICINE | Admitting: EMERGENCY MEDICINE
Payer: MEDICARE

## 2019-07-11 VITALS
HEIGHT: 72 IN | TEMPERATURE: 96.2 F | SYSTOLIC BLOOD PRESSURE: 163 MMHG | BODY MASS INDEX: 23.03 KG/M2 | HEART RATE: 66 BPM | RESPIRATION RATE: 16 BRPM | WEIGHT: 170 LBS | OXYGEN SATURATION: 99 % | DIASTOLIC BLOOD PRESSURE: 89 MMHG

## 2019-07-11 DIAGNOSIS — K04.7 DENTAL ABSCESS: ICD-10-CM

## 2019-07-11 DIAGNOSIS — K08.89 PAIN, DENTAL: Primary | ICD-10-CM

## 2019-07-11 PROCEDURE — 99282 EMERGENCY DEPT VISIT SF MDM: CPT

## 2019-07-11 RX ORDER — IBUPROFEN 800 MG/1
800 TABLET ORAL EVERY 6 HOURS PRN
Qty: 30 TABLET | Refills: 0 | Status: SHIPPED | OUTPATIENT
Start: 2019-07-11 | End: 2019-12-17

## 2019-07-11 RX ORDER — IBUPROFEN 400 MG/1
800 TABLET ORAL ONCE
Status: COMPLETED | OUTPATIENT
Start: 2019-07-11 | End: 2019-07-11

## 2019-07-11 RX ORDER — CLINDAMYCIN HYDROCHLORIDE 150 MG/1
300 CAPSULE ORAL ONCE
Status: COMPLETED | OUTPATIENT
Start: 2019-07-11 | End: 2019-07-11

## 2019-07-11 RX ORDER — SACCHAROMYCES BOULARDII 250 MG
250 CAPSULE ORAL 2 TIMES DAILY
Qty: 30 CAPSULE | Refills: 0 | Status: SHIPPED | OUTPATIENT
Start: 2019-07-11 | End: 2019-07-26

## 2019-07-11 RX ORDER — CLINDAMYCIN HYDROCHLORIDE 300 MG/1
300 CAPSULE ORAL 3 TIMES DAILY
Qty: 29 CAPSULE | Refills: 0 | Status: SHIPPED | OUTPATIENT
Start: 2019-07-11 | End: 2019-07-21

## 2019-07-11 RX ADMIN — IBUPROFEN 800 MG: 400 TABLET ORAL at 17:10

## 2019-07-11 RX ADMIN — CLINDAMYCIN HYDROCHLORIDE 300 MG: 150 CAPSULE ORAL at 17:11

## 2019-07-11 NOTE — ED PROVIDER NOTES
History  Chief Complaint   Patient presents with    Dental Pain     pt c/o left lower dental pain and swelling  30 y/o male presenting with left lower dental pain over the past few months accompanied with left-sided facial swelling over the past 2 weeks  States that he noticed pus drainage from the inside of his mouth as well as the outside after pushing on the area  States that the swelling has gone down after draining this  He has not made an appointment with a dentist   [place] fevers yesterday however this was subjective  Presents afebrile day  Pain 7/10 nonradiating  Requesting tramadol  Denies nausea, vomiting, cough, difficulty swallowing foods or liquids  None       Past Medical History:   Diagnosis Date    ADHD (attention deficit hyperactivity disorder)     Hypertension     Inguinal hernia     Psychiatric disorder     anxiety, depression, social personality diorder       History reviewed  No pertinent surgical history  History reviewed  No pertinent family history  I have reviewed and agree with the history as documented  Social History     Tobacco Use    Smoking status: Current Every Day Smoker     Packs/day: 1 00     Types: Cigarettes    Smokeless tobacco: Never Used   Substance Use Topics    Alcohol use: Yes    Drug use: No     Types: Cocaine, Heroin     Comment: got out of rehab yesterday        Review of Systems   Constitutional: Negative  Negative for appetite change, chills and fever  HENT: Positive for dental problem and facial swelling  Negative for congestion, drooling, ear discharge, ear pain, hearing loss, mouth sores, nosebleeds, postnasal drip, rhinorrhea, sinus pressure, sinus pain, sneezing, sore throat, tinnitus, trouble swallowing and voice change  Eyes: Negative  Respiratory: Negative  Negative for cough, chest tightness, shortness of breath and wheezing  Cardiovascular: Negative  Gastrointestinal: Negative    Negative for constipation, diarrhea, nausea and vomiting  Genitourinary: Negative  Musculoskeletal: Negative  Negative for neck pain and neck stiffness  Skin: Positive for wound  Negative for color change, pallor and rash  Neurological: Negative  Negative for dizziness, weakness, numbness and headaches  All other systems reviewed and are negative  Physical Exam  Physical Exam   Constitutional: He is oriented to person, place, and time  He appears well-developed  HENT:   Head: Normocephalic and atraumatic  Right Ear: External ear normal    Left Ear: External ear normal    Nose: Nose normal    Mouth/Throat: Oropharynx is clear and moist        Eyes: Pupils are equal, round, and reactive to light  Conjunctivae are normal    Cardiovascular: Normal rate and intact distal pulses  Pulmonary/Chest: Effort normal    S PO2 is 99% indicating adequate oxygenation  Neurological: He is alert and oriented to person, place, and time  Skin: Skin is warm and dry  Capillary refill takes less than 2 seconds  Nursing note and vitals reviewed  Vital Signs  ED Triage Vitals [07/11/19 1637]   Temperature Pulse Respirations Blood Pressure SpO2   (!) 96 2 °F (35 7 °C) 66 16 163/89 99 %      Temp Source Heart Rate Source Patient Position - Orthostatic VS BP Location FiO2 (%)   Tympanic Monitor Sitting Right arm --      Pain Score       9           Vitals:    07/11/19 1637   BP: 163/89   Pulse: 66   Patient Position - Orthostatic VS: Sitting         Visual Acuity      ED Medications  Medications   clindamycin (CLEOCIN) capsule 300 mg (has no administration in time range)   ibuprofen (MOTRIN) tablet 800 mg (has no administration in time range)       Diagnostic Studies  Results Reviewed     None                 No orders to display              Procedures  Procedures       ED Course                               MDM  Number of Diagnoses or Management Options  Dental abscess:   Pain, dental:   Diagnosis management comments:  Will treat for dental pain/abscess, however discussed with patient concern for dental fistula, he was given maxillofacial surgery follow-up information highly encouraged to make an appointment as soon as possible  Will start on antibiotics, given proper education regarding these medications  Strict return precautions for any worsening  Patient verbalizes understanding and agrees with the above assessment plan  Amount and/or Complexity of Data Reviewed  Review and summarize past medical records: yes  Independent visualization of images, tracings, or specimens: yes        Disposition  Final diagnoses:   Pain, dental   Dental abscess     Time reflects when diagnosis was documented in both MDM as applicable and the Disposition within this note     Time User Action Codes Description Comment    7/11/2019  4:54 PM Isadora Strausss Add [K08 89] Pain, dental     7/11/2019  4:54 PM Isadora Bowen Add [K04 7] Dental abscess       ED Disposition     ED Disposition Condition Date/Time Comment    Discharge Stable u Jul 11, 2019  4:54 PM Hank Tello discharge to home/self care              Follow-up Information     Follow up With Specialties Details Why Contact Info Additional Information    [address] [facility] Emergency Medicine Go to  If symptoms worsen otherwise please schedule appointment and refer to the maxillofacial sheet given to at discharge [address] [facility] ED, [facility], [address]          Patient's Medications   Discharge Prescriptions    CLINDAMYCIN (CLEOCIN) 300 MG CAPSULE    Take 1 capsule (300 mg total) by mouth 3 (three) times a day for 10 days       Start Date: 7/11/2019 End Date: 7/21/2019       Order Dose: 300 mg       Quantity: 29 capsule    Refills: 0    IBUPROFEN (MOTRIN) 800 MG TABLET    Take 1 tablet (800 mg total) by mouth every 6 (six) hours as needed for moderate pain for up to 10 days Start Date: 7/11/2019 End Date: 7/21/2019       Order Dose: 800 mg       Quantity: 30 tablet    Refills: 0    SACCHAROMYCES BOULARDII (FLORASTOR) 250 MG CAPSULE    Take 1 capsule (250 mg total) by mouth 2 (two) times a day for 15 days       Start Date: 7/11/2019 End Date: 7/26/2019       Order Dose: 250 mg       Quantity: 30 capsule    Refills: 0     No discharge procedures on file      ED Provider  Electronically Signed by           Katlin Grace PA-C  07/11/19 8001

## 2019-07-17 ENCOUNTER — APPOINTMENT (EMERGENCY)
Dept: RADIOLOGY | Facility: HOSPITAL | Age: 34
End: 2019-07-17
Payer: MEDICARE

## 2019-07-17 ENCOUNTER — HOSPITAL ENCOUNTER (EMERGENCY)
Facility: HOSPITAL | Age: 34
Discharge: HOME | End: 2019-07-17
Attending: EMERGENCY MEDICINE | Admitting: EMERGENCY MEDICINE
Payer: MEDICARE

## 2019-07-17 VITALS
DIASTOLIC BLOOD PRESSURE: 91 MMHG | WEIGHT: 170.42 LBS | TEMPERATURE: 98.3 F | HEIGHT: 72 IN | BODY MASS INDEX: 23.08 KG/M2 | SYSTOLIC BLOOD PRESSURE: 140 MMHG | HEART RATE: 95 BPM | OXYGEN SATURATION: 99 % | RESPIRATION RATE: 16 BRPM

## 2019-07-17 DIAGNOSIS — K04.7 DENTAL ABSCESS: Primary | ICD-10-CM

## 2019-07-17 LAB
ANION GAP SERPL CALCULATED.3IONS-SCNC: 4 MMOL/L (ref 4–13)
BASOPHILS # BLD AUTO: 0.06 THOUSANDS/ΜL (ref 0–0.1)
BASOPHILS NFR BLD AUTO: 1 % (ref 0–1)
BUN SERPL-MCNC: 15 MG/DL (ref 5–25)
CALCIUM SERPL-MCNC: 9.5 MG/DL (ref 8.3–10.1)
CHLORIDE SERPL-SCNC: 99 MMOL/L (ref 100–108)
CO2 SERPL-SCNC: 31 MMOL/L (ref 21–32)
CREAT SERPL-MCNC: 0.9 MG/DL (ref 0.6–1.3)
EOSINOPHIL # BLD AUTO: 0.24 THOUSAND/ΜL (ref 0–0.61)
EOSINOPHIL NFR BLD AUTO: 2 % (ref 0–6)
ERYTHROCYTE [DISTWIDTH] IN BLOOD BY AUTOMATED COUNT: 13 % (ref 11.6–15.1)
GFR SERPL CREATININE-BSD FRML MDRD: 113 ML/MIN/1.73SQ M
GLUCOSE SERPL-MCNC: 113 MG/DL (ref 65–140)
HCT VFR BLD AUTO: 49.9 % (ref 36.5–49.3)
HGB BLD-MCNC: 16 G/DL (ref 12–17)
IMM GRANULOCYTES # BLD AUTO: 0.05 THOUSAND/UL (ref 0–0.2)
IMM GRANULOCYTES NFR BLD AUTO: 1 % (ref 0–2)
LYMPHOCYTES # BLD AUTO: 1.98 THOUSANDS/ΜL (ref 0.6–4.47)
LYMPHOCYTES NFR BLD AUTO: 18 % (ref 14–44)
MCH RBC QN AUTO: 30.2 PG (ref 26.8–34.3)
MCHC RBC AUTO-ENTMCNC: 32.1 G/DL (ref 31.4–37.4)
MCV RBC AUTO: 94 FL (ref 82–98)
MONOCYTES # BLD AUTO: 0.74 THOUSAND/ΜL (ref 0.17–1.22)
MONOCYTES NFR BLD AUTO: 7 % (ref 4–12)
NEUTROPHILS # BLD AUTO: 7.95 THOUSANDS/ΜL (ref 1.85–7.62)
NEUTS SEG NFR BLD AUTO: 71 % (ref 43–75)
NRBC BLD AUTO-RTO: 0 /100 WBCS
PLATELET # BLD AUTO: 261 THOUSANDS/UL (ref 149–390)
PMV BLD AUTO: 10.8 FL (ref 8.9–12.7)
POTASSIUM SERPL-SCNC: 4.4 MMOL/L (ref 3.5–5.3)
RBC # BLD AUTO: 5.3 MILLION/UL (ref 3.88–5.62)
SODIUM SERPL-SCNC: 134 MMOL/L (ref 136–145)
WBC # BLD AUTO: 11.02 THOUSAND/UL (ref 4.31–10.16)

## 2019-07-17 PROCEDURE — 96365 THER/PROPH/DIAG IV INF INIT: CPT

## 2019-07-17 PROCEDURE — 85025 COMPLETE CBC W/AUTO DIFF WBC: CPT | Performed by: PHYSICIAN ASSISTANT

## 2019-07-17 PROCEDURE — 99283 EMERGENCY DEPT VISIT LOW MDM: CPT

## 2019-07-17 PROCEDURE — 99283 EMERGENCY DEPT VISIT LOW MDM: CPT | Performed by: PHYSICIAN ASSISTANT

## 2019-07-17 PROCEDURE — 80048 BASIC METABOLIC PNL TOTAL CA: CPT | Performed by: PHYSICIAN ASSISTANT

## 2019-07-17 PROCEDURE — 10060 I&D ABSCESS SIMPLE/SINGLE: CPT | Performed by: PHYSICIAN ASSISTANT

## 2019-07-17 PROCEDURE — 70355 PANORAMIC X-RAY OF JAWS: CPT

## 2019-07-17 PROCEDURE — 41005 DRAINAGE OF MOUTH LESION: CPT | Performed by: PHYSICIAN ASSISTANT

## 2019-07-17 PROCEDURE — 36415 COLL VENOUS BLD VENIPUNCTURE: CPT | Performed by: PHYSICIAN ASSISTANT

## 2019-07-17 RX ORDER — ACETAMINOPHEN AND CODEINE PHOSPHATE 300; 30 MG/1; MG/1
1 TABLET ORAL EVERY 6 HOURS PRN
Qty: 12 TABLET | Refills: 0 | Status: SHIPPED | OUTPATIENT
Start: 2019-07-17 | End: 2019-07-20

## 2019-07-17 RX ORDER — AMOXICILLIN 500 MG/1
500 CAPSULE ORAL EVERY 8 HOURS
Qty: 21 CAPSULE | Refills: 0 | Status: SHIPPED | OUTPATIENT
Start: 2019-07-17 | End: 2019-07-24

## 2019-07-17 RX ORDER — LIDOCAINE HYDROCHLORIDE 10 MG/ML
5 INJECTION, SOLUTION EPIDURAL; INFILTRATION; INTRACAUDAL; PERINEURAL ONCE
Status: COMPLETED | OUTPATIENT
Start: 2019-07-17 | End: 2019-07-17

## 2019-07-17 RX ADMIN — LIDOCAINE HYDROCHLORIDE 5 ML: 10 INJECTION, SOLUTION EPIDURAL; INFILTRATION; INTRACAUDAL; PERINEURAL at 16:51

## 2019-07-17 RX ADMIN — AMPICILLIN SODIUM AND SULBACTAM SODIUM 3 G: 2; 1 INJECTION, POWDER, FOR SOLUTION INTRAMUSCULAR; INTRAVENOUS at 16:49

## 2019-07-17 RX ADMIN — Medication 1 APPLICATION: at 16:51

## 2019-07-17 NOTE — ED PROVIDER NOTES
History  Chief Complaint   Patient presents with    Abscess     Pt with left sided facial swelling started on motrin and antibiotic 6 days ago, pain is worse  81-year-old male with no significant past history presents for evaluation of left lower facial swelling for the past 6 days  Patient reports that he was seen at [facility] ED, given clindamycin and naproxen  Patient reports that he initially had discharge from the inside of his mouth and over the outside of his lower jaw  Reports that he was advised not to express the pus  Patient states that he has pain over the lower left jaw and worsening swelling  Also reports that for the past year he has had a fractured tooth over the left lower molar, tooth number 18  Patient reports he has a dental appointment in approximately a month and a half  Denies any fever, chills, nausea vomiting, difficulty breathing, chest pain, shortness of breath  Patient reports he is a smoker and smokes approximately pack a day  Prior to Admission Medications   Prescriptions Last Dose Informant Patient Reported? Taking? clindamycin (CLEOCIN) 300 MG capsule   No Yes   Sig: Take 1 capsule (300 mg total) by mouth 3 (three) times a day for 10 days   ibuprofen (MOTRIN) 800 mg tablet   No Yes   Sig: Take 1 tablet (800 mg total) by mouth every 6 (six) hours as needed for moderate pain for up to 10 days   saccharomyces boulardii (FLORASTOR) 250 mg capsule   No Yes   Sig: Take 1 capsule (250 mg total) by mouth 2 (two) times a day for 15 days      Facility-Administered Medications: None       Past Medical History:   Diagnosis Date    ADHD (attention deficit hyperactivity disorder)     Hypertension     Inguinal hernia     Psychiatric disorder     anxiety, depression, social personality diorder       History reviewed  No pertinent surgical history  History reviewed  No pertinent family history  I have reviewed and agree with the history as documented      Social History Tobacco Use    Smoking status: Current Every Day Smoker     Packs/day: 1 00     Types: Cigarettes    Smokeless tobacco: Never Used   Substance Use Topics    Alcohol use: Yes    Drug use: No     Types: Cocaine, Heroin     Comment: got out of rehab yesterday        Review of Systems   Constitutional: Negative for appetite change, chills and fever  HENT: Positive for dental problem  Negative for facial swelling, sinus pressure, sore throat and trouble swallowing  Respiratory: Negative for chest tightness and shortness of breath  Gastrointestinal: Negative for nausea and vomiting  Musculoskeletal: Negative for neck pain and neck stiffness  Skin: Negative  Neurological: Negative for facial asymmetry  Physical Exam  Physical Exam   Constitutional: He appears well-developed and well-nourished  No distress  HENT:   Head:       Mouth/Throat: Uvula is midline and mucous membranes are normal  Abnormal dentition  Dental abscesses present  No oropharyngeal exudate, posterior oropharyngeal edema, posterior oropharyngeal erythema or tonsillar abscesses  Cardiovascular: Normal rate and normal heart sounds  Pulmonary/Chest: Effort normal and breath sounds normal    Skin: He is not diaphoretic  Vitals reviewed        Vital Signs  ED Triage Vitals   Temperature Pulse Respirations Blood Pressure SpO2   07/17/19 1511 07/17/19 1511 07/17/19 1511 07/17/19 1513 07/17/19 1511   98 3 °F (36 8 °C) 95 16 140/91 99 %      Temp Source Heart Rate Source Patient Position - Orthostatic VS BP Location FiO2 (%)   07/17/19 1511 -- 07/17/19 1511 07/17/19 1511 --   Oral  Sitting Right arm       Pain Score       07/17/19 1511       9           Vitals:    07/17/19 1511 07/17/19 1513   BP:  140/91   Pulse: 95    Patient Position - Orthostatic VS: Sitting          Visual Acuity      ED Medications  Medications   ampicillin-sulbactam (UNASYN) 3 g in sodium chloride 0 9 % 100 mL IVPB (0 g Intravenous Stopped 7/17/19 1816)   LET gel 1 application (1 application Topical Given 7/17/19 1651)   lidocaine (PF) (XYLOCAINE-MPF) 1 % injection 5 mL (5 mL Infiltration Given 7/17/19 1651)       Diagnostic Studies  Results Reviewed     Procedure Component Value Units Date/Time    Basic metabolic panel [[id number]]  (Abnormal) Collected:  07/17/19 1645    Lab Status:  Final result Specimen:  Blood from Arm, Right Updated:  07/17/19 1713     Sodium 134 mmol/L      Potassium 4 4 mmol/L      Chloride 99 mmol/L      CO2 31 mmol/L      ANION GAP 4 mmol/L      BUN 15 mg/dL      Creatinine 0 90 mg/dL      Glucose 113 mg/dL      Calcium 9 5 mg/dL      eGFR 113 ml/min/1 73sq m     Narrative:       [place] guidelines for Chronic Kidney Disease (CKD):     Stage 1 with normal or high GFR (GFR > 90 mL/min/1 73 square meters)    Stage 2 Mild CKD (GFR = 60-89 mL/min/1 73 square meters)    Stage 3A Moderate CKD (GFR = 45-59 mL/min/1 73 square meters)    Stage 3B Moderate CKD (GFR = 30-44 mL/min/1 73 square meters)    Stage 4 Severe CKD (GFR = 15-29 mL/min/1 73 square meters)    Stage 5 End Stage CKD (GFR <15 mL/min/1 73 square meters)  Note: GFR calculation is accurate only with a steady state creatinine    CBC and differential [[id number]]  (Abnormal) Collected:  07/17/19 1645    Lab Status:  Final result Specimen:  Blood from Arm, Right Updated:  07/17/19 1652     WBC 11 02 Thousand/uL      RBC 5 30 Million/uL      Hemoglobin 16 0 g/dL      Hematocrit 49 9 %      MCV 94 fL      MCH 30 2 pg      MCHC 32 1 g/dL      RDW 13 0 %      MPV 10 8 fL      Platelets 589 Thousands/uL      nRBC 0 /100 WBCs      Neutrophils Relative 71 %      Immat GRANS % 1 %      Lymphocytes Relative 18 %      Monocytes Relative 7 %      Eosinophils Relative 2 %      Basophils Relative 1 %      Neutrophils Absolute 7 95 Thousands/µL      Immature Grans Absolute 0 05 Thousand/uL      Lymphocytes Absolute 1 98 Thousands/µL      Monocytes Absolute 0 74 Thousand/µL      Eosinophils Absolute 0 24 Thousand/µL      Basophils Absolute 0 06 Thousands/µL                  XR mandible panorex ([place] only)   Final Result by Arely Carty MD (07/17 1701)      Periapical lucency surrounding the left 1st and 2nd mandibular molars, which is likely infectious in nature given the clinical history  Crown of the left 2nd mandibular molar is also carious/fractured in appearance  Correlate with dental examination  Workstation performed: [id number]                    Procedures  Nerve block  Date/Time: 7/17/2019 5:30 PM  Performed by: Nitin Dunlap PA-C  Authorized by: Nitin Dunlap PA-C     Patient location:  ED  Other Assisting Provider: Yes (comment) (ED dental resident)    Consent:     Consent obtained:  Verbal    Consent given by:  Patient    Risks discussed:  Infection, bleeding, unsuccessful block and pain  Universal protocol:     Patient identity confirmed:  Verbally with patient  Indications:     Indications:  Procedural anesthesia  Location:     Nerve block body site: Mouth - inferior alveolar block  Laterality:  Left  Procedure details (see MAR for exact dosages): Block needle gauge:  25 G    Anesthetic injected:  Lidocaine 1% w/o epi    Injection procedure:  Introduced needle, negative aspiration for blood, incremental injection, anatomic landmarks palpated and anatomic landmarks identified  Post-procedure details:     Dressing:  None    Outcome:  Anesthesia achieved    Patient tolerance of procedure:   Tolerated well, no immediate complications  Incision and drain  Date/Time: 7/17/2019 5:46 PM  Performed by: Nitin Dunlap PA-C  Authorized by: Nitin Dunlap PA-C     Patient location:  ED  Other Assisting Provider: Yes (comment) (ED dental resident)    Consent:     Consent obtained:  Verbal    Consent given by:  Patient    Risks discussed:  Bleeding, incomplete drainage, pain and infection  Universal protocol:     Patient identity confirmed:  Verbally with patient  Location:     Type:  Abscess    Size:  1 - 2 cm    Location:  Mouth    Location Detail:  Intraoral and extraoral    Mouth location:  Alveolar process  Anesthesia (see MAR for exact dosages): Anesthesia method:  Local infiltration    Local anesthetic:  Lidocaine 1% w/o epi  Procedure details:     Complexity:  Simple    Incision types:  Stab incision    Scalpel blade:  15    Approach:  Open    Incision depth:  Submucosal    Drainage:  Bloody    Drainage amount:  Scant    Wound treatment:  Wound left open    Packing materials:  None  Post-procedure details:     Patient tolerance of procedure: Tolerated well, no immediate complications  Incision and drain  Date/Time: 7/17/2019 5:57 PM  Performed by: Akira Mims PA-C  Authorized by: Akira Mims PA-C     Patient location:  ED  Other Assisting Provider: Yes (comment) (ED dental resident)    Consent:     Consent obtained:  Verbal    Consent given by:  Patient    Risks discussed:  Bleeding, incomplete drainage, infection and pain  Universal protocol:     Patient identity confirmed:  Verbally with patient  Location:     Type:  Abscess    Location:  Head/neck    Head/neck location:  Face  Pre-procedure details:     Skin preparation:  Antiseptic wash  Anesthesia (see MAR for exact dosages): Anesthesia method:  Topical application    Topical anesthetic:  LET  Procedure details:     Incision types:  Stab incision    Scalpel blade:  11    Approach:  Open    Incision depth:  Subcutaneous    Drainage:  Bloody and purulent    Drainage amount:  Scant    Wound treatment:  Wound left open    Packing materials:  None  Post-procedure details:     Patient tolerance of procedure: Tolerated well, no immediate complications           ED Course                               MDM  Number of Diagnoses or Management Options  Dental abscess:   Diagnosis management comments: 21-year-old male presents for evaluation of a left-sided facial swelling    Noted to have an dental abscess with a fistula  Draining noted from the outside  Patient on clindamycin  Dental resident available in the ED at this time  I&D over inside and outside of face  Reports to change his antibiotic to amoxicillin  Advised patient to follow up with the dental clinic in 5 days and will ensure follow-up  Amount and/or Complexity of Data Reviewed  Review and summarize past medical records: yes        Disposition  Final diagnoses:   Dental abscess     Time reflects when diagnosis was documented in both MDM as applicable and the Disposition within this note     Time User Action Codes Description Comment    7/17/2019  6:08 PM Carolee Lind Add [K04 7] Dental abscess       ED Disposition     ED Disposition Condition Date/Time Comment    Discharge Stable Wed Jul 17, 2019  6:08 PM Hank Tello discharge to home/self care              Follow-up Information     Follow up With Specialties Details Why Janine  Schedule an appointment as soon as possible for a visit  Follow up for further evaluation [address] [place] [address] [address]  [facility] [facility] [address]  [phone]          Discharge Medication List as of 7/17/2019  6:10 PM      START taking these medications    Details   amoxicillin (AMOXIL) 500 mg capsule Take 1 capsule (500 mg total) by mouth every 8 (eight) hours for 7 days, Starting Wed 7/17/2019, Until Wed 7/24/2019, Print         CONTINUE these medications which have NOT CHANGED    Details   clindamycin (CLEOCIN) 300 MG capsule Take 1 capsule (300 mg total) by mouth 3 (three) times a day for 10 days, Starting Thu 7/11/2019, Until Sun 7/21/2019, Print      ibuprofen (MOTRIN) 800 mg tablet Take 1 tablet (800 mg total) by mouth every 6 (six) hours as needed for moderate pain for up to 10 days, Starting u 7/11/2019, Until Sun 7/21/2019, Print      saccharomyces boulardii (FLORASTOR) 250 mg capsule Take 1 capsule (250 mg total) by mouth 2 (two) times a day for 15 days, Starting Thu 7/11/2019, Until Fri 7/26/2019, Print           No discharge procedures on file      ED Provider  Electronically Signed by           Wesley Garcia PA-C  07/19/19 8694

## 2019-11-26 ENCOUNTER — HOSPITAL ENCOUNTER (EMERGENCY)
Facility: HOSPITAL | Age: 34
Discharge: HOME | End: 2019-11-26
Attending: EMERGENCY MEDICINE | Admitting: EMERGENCY MEDICINE
Payer: MEDICARE

## 2019-11-26 VITALS
SYSTOLIC BLOOD PRESSURE: 130 MMHG | TEMPERATURE: 98.7 F | BODY MASS INDEX: 22.81 KG/M2 | DIASTOLIC BLOOD PRESSURE: 77 MMHG | WEIGHT: 168.21 LBS | HEART RATE: 79 BPM | OXYGEN SATURATION: 100 % | RESPIRATION RATE: 18 BRPM

## 2019-11-26 DIAGNOSIS — K03.81 CRACKED TOOTH: ICD-10-CM

## 2019-11-26 DIAGNOSIS — L72.3 SEBACEOUS CYST: ICD-10-CM

## 2019-11-26 DIAGNOSIS — K02.9 DENTAL CARIES: Primary | ICD-10-CM

## 2019-11-26 PROCEDURE — 99282 EMERGENCY DEPT VISIT SF MDM: CPT

## 2019-11-26 RX ORDER — NAPROXEN 500 MG/1
500 TABLET ORAL 2 TIMES DAILY WITH MEALS
Qty: 20 TABLET | Refills: 0 | Status: SHIPPED | OUTPATIENT
Start: 2019-11-26 | End: 2020-03-12

## 2019-11-26 RX ORDER — NAPROXEN 500 MG/1
500 TABLET ORAL ONCE
Status: COMPLETED | OUTPATIENT
Start: 2019-11-26 | End: 2019-11-26

## 2019-11-26 RX ORDER — CLINDAMYCIN HYDROCHLORIDE 150 MG/1
150 CAPSULE ORAL EVERY 8 HOURS SCHEDULED
Qty: 21 CAPSULE | Refills: 0 | Status: SHIPPED | OUTPATIENT
Start: 2019-11-26 | End: 2019-12-03

## 2019-11-26 RX ORDER — CLINDAMYCIN HYDROCHLORIDE 150 MG/1
300 CAPSULE ORAL ONCE
Status: COMPLETED | OUTPATIENT
Start: 2019-11-26 | End: 2019-11-26

## 2019-11-26 RX ADMIN — CLINDAMYCIN HYDROCHLORIDE 300 MG: 150 CAPSULE ORAL at 18:11

## 2019-11-26 RX ADMIN — NAPROXEN 500 MG: 500 TABLET ORAL at 18:11

## 2019-11-26 NOTE — ED PROVIDER NOTES
History  Chief Complaint   Patient presents with    Abscess     has an abscess on the left cheek was also has a dental abscess whish he has been seen for in the past and has not followed up with the dentist     41-year-old male presents to the ED for evaluation of dental caries, toothache, and this cyst to his left outer cheek  Patient has been evaluated in the past for similar complaints  Patient states that he can follow up with dentist or any other physicians because he does not have insurance  Patient is currently working on [employer]  History provided by:  Patient  Abscess   Associated symptoms: no fatigue, no fever, no headaches, no nausea and no vomiting        Prior to Admission Medications   Prescriptions Last Dose Informant Patient Reported? Taking?   ibuprofen (MOTRIN) 800 mg tablet   No No   Sig: Take 1 tablet (800 mg total) by mouth every 6 (six) hours as needed for moderate pain for up to 10 days      Facility-Administered Medications: None       Past Medical History:   Diagnosis Date    ADHD (attention deficit hyperactivity disorder)     Hypertension     Inguinal hernia     Psychiatric disorder     anxiety, depression, social personality diorder       History reviewed  No pertinent surgical history  History reviewed  No pertinent family history  I have reviewed and agree with the history as documented  Social History     Tobacco Use    Smoking status: Current Every Day Smoker     Packs/day: 1 00     Types: Cigarettes    Smokeless tobacco: Never Used   Substance Use Topics    Alcohol use: Yes    Drug use: Yes     Types: Cocaine, Heroin     Comment: last used heroine 4-5 days ago        Review of Systems   Constitutional: Negative for activity change, fatigue and fever  HENT: Positive for dental problem  Negative for congestion, ear discharge and sore throat  Eyes: Negative for pain and redness     Respiratory: Negative for cough, chest tightness, shortness of breath and wheezing  Cardiovascular: Negative for chest pain  Gastrointestinal: Negative for abdominal pain, diarrhea, nausea and vomiting  Endocrine: Negative for cold intolerance  Genitourinary: Negative for dysuria and urgency  Musculoskeletal: Negative for arthralgias and back pain  Skin: Positive for rash  Neurological: Negative for dizziness, weakness and headaches  Psychiatric/Behavioral: Negative for agitation and behavioral problems  Physical Exam  Physical Exam   Constitutional: He is oriented to person, place, and time  He appears well-developed and well-nourished  HENT:   Head: Normocephalic and atraumatic  Nose: Nose normal    Mouth/Throat: Oropharynx is clear and moist    Poor dentition noted throughout  Cracked molar tooth noted to the left lower jaw  No signs of dental abscess noted  1 cm x 1 cm area of old appearing induration noted that appears to be consistent with sebaceous cyst   No fluctuance noted  Eyes: Conjunctivae and EOM are normal    Neck: Normal range of motion  Neck supple  Cardiovascular: Normal rate, regular rhythm and normal heart sounds  Pulmonary/Chest: Effort normal and breath sounds normal    Abdominal: Soft  Bowel sounds are normal  He exhibits no distension  There is no tenderness  Musculoskeletal: Normal range of motion  Neurological: He is alert and oriented to person, place, and time  Skin: Skin is warm  Psychiatric: He has a normal mood and affect  His behavior is normal  Judgment and thought content normal    Nursing note and vitals reviewed        Vital Signs  ED Triage Vitals [11/26/19 1742]   Temperature Pulse Respirations Blood Pressure SpO2   98 7 °F (37 1 °C) 79 18 130/77 100 %      Temp Source Heart Rate Source Patient Position - Orthostatic VS BP Location FiO2 (%)   Tympanic Monitor Sitting Right arm --      Pain Score       8           Vitals:    11/26/19 1742   BP: 130/77   Pulse: 79   Patient Position - Orthostatic VS: Sitting         Visual Acuity      ED Medications  Medications   clindamycin (CLEOCIN) capsule 300 mg (300 mg Oral Given 11/26/19 1811)   naproxen (NAPROSYN) tablet 500 mg (500 mg Oral Given 11/26/19 1811)       Diagnostic Studies  Results Reviewed     None                 No orders to display              Procedures  Procedures       ED Course                               MDM  Number of Diagnoses or Management Options  Cracked tooth: new and requires workup  Dental caries: new and requires workup  Sebaceous cyst: new and requires workup  Risk of Complications, Morbidity, and/or Mortality  General comments: Patient's history and physical is consistent with dental caries  Patient placed on empiric antibiotic  I encouraged patient to go see a dentist as soon as possible  Patient given referral to our dental clinic  Patient also has a sebaceous cyst that has drained multiple times in the past   I sent referral to general surgery for removal of the cyst   Cyst today is not draining, nontender, not warm to touch  Close return instructions given to return to the ER for any worsening symptoms  Patient agrees with discharge plan  Patient well appearing at time of discharge  Patient Progress  Patient progress: stable      Disposition  Final diagnoses:   Dental caries   Cracked tooth   Sebaceous cyst     Time reflects when diagnosis was documented in both MDM as applicable and the Disposition within this note     Time User Action Codes Description Comment    11/26/2019  6:01 PM Angel Luis Soriano Add [K02 9] Dental caries     11/26/2019  6:01 PM Angel Luis Soriano Add [K03 81] Cracked tooth     11/26/2019  6:02 PM Angel Luis Soriano Add [L72 3] Sebaceous cyst       ED Disposition     ED Disposition Condition Date/Time Comment    Discharge Stable Tue Nov 26, 2019  6:01 PM Hank Tello discharge to home/self care              Follow-up Information     Follow up With Specialties Details Why Contact Info Please folow up with your own dentist or call the number we gave you  Clarice Clements MD General Surgery, Wound Care  Please call to make a follow up appointment for removal of your sebaceous cyst  [address] [facility]  [phone]            Discharge Medication List as of 11/26/2019  6:06 PM      START taking these medications    Details   clindamycin (CLEOCIN) 150 mg capsule Take 1 capsule (150 mg total) by mouth every 8 (eight) hours for 7 days, Starting Tue 11/26/2019, Until Tue 12/3/2019, Normal      naproxen (EC NAPROSYN) 500 MG EC tablet Take 1 tablet (500 mg total) by mouth 2 (two) times a day with meals, Starting Tue 11/26/2019, Normal         CONTINUE these medications which have NOT CHANGED    Details   ibuprofen (MOTRIN) 800 mg tablet Take 1 tablet (800 mg total) by mouth every 6 (six) hours as needed for moderate pain for up to 10 days, Starting Thu 7/11/2019, Until Sun 7/21/2019, Print           No discharge procedures on file      ED Provider  Electronically Signed by           Josselin Gan DO  11/26/19 1955

## 2019-12-17 ENCOUNTER — HOSPITAL ENCOUNTER (EMERGENCY)
Facility: HOSPITAL | Age: 34
Discharge: HOME | End: 2019-12-17
Attending: EMERGENCY MEDICINE
Payer: MEDICARE

## 2019-12-17 VITALS
TEMPERATURE: 97.1 F | DIASTOLIC BLOOD PRESSURE: 76 MMHG | SYSTOLIC BLOOD PRESSURE: 128 MMHG | HEART RATE: 110 BPM | BODY MASS INDEX: 22.92 KG/M2 | WEIGHT: 169 LBS | OXYGEN SATURATION: 96 % | RESPIRATION RATE: 18 BRPM

## 2019-12-17 DIAGNOSIS — K02.9 DENTAL CARIES: Primary | ICD-10-CM

## 2019-12-17 DIAGNOSIS — K04.7 DENTAL ABSCESS: ICD-10-CM

## 2019-12-17 PROCEDURE — 99283 EMERGENCY DEPT VISIT LOW MDM: CPT

## 2019-12-17 RX ORDER — IBUPROFEN 800 MG/1
800 TABLET ORAL EVERY 6 HOURS PRN
Qty: 30 TABLET | Refills: 0 | Status: SHIPPED | OUTPATIENT
Start: 2019-12-17 | End: 2020-03-12

## 2019-12-17 RX ORDER — PREDNISONE 20 MG/1
40 TABLET ORAL DAILY
Qty: 10 TABLET | Refills: 0 | Status: SHIPPED | OUTPATIENT
Start: 2019-12-17 | End: 2019-12-22

## 2019-12-17 NOTE — ED PROVIDER NOTES
History  Chief Complaint   Patient presents with    Facial Swelling     c/o abcess left side of face - states he has had for several months, seen here for same  states just finished antibiotics but it is not improving  37 y/o male presenting with a left sided dental abscess and pain x 7 months  Relays this is not improving with antibiotics  States that it will drain on and off over the past few months  Recently ended clindamycin course of antibiotics  Prior to this was on amoxicillin  States he has not made an appointment with a dentist  Taking OTC medications with minimal relief  Relays that the abscess will drain to the outside of the jaw at times over the past few months  Swelling will also come and go  Denies fevers, nausea vomiting, difficulty swallowing  Prior to Admission Medications   Prescriptions Last Dose Informant Patient Reported? Taking?   naproxen (EC NAPROSYN) 500 MG EC tablet 12/17/2019 at Unknown time  No Yes   Sig: Take 1 tablet (500 mg total) by mouth 2 (two) times a day with meals      Facility-Administered Medications: None       Past Medical History:   Diagnosis Date    ADHD (attention deficit hyperactivity disorder)     Hypertension     Inguinal hernia     Psychiatric disorder     anxiety, depression, social personality diorder       History reviewed  No pertinent surgical history  History reviewed  No pertinent family history  I have reviewed and agree with the history as documented  Social History     Tobacco Use    Smoking status: Current Every Day Smoker     Packs/day: 2 00     Types: Cigarettes    Smokeless tobacco: Never Used   Substance Use Topics    Alcohol use: Yes    Drug use: Yes     Types: Cocaine, Heroin     Comment: states "I use it all"        Review of Systems   Constitutional: Negative  Negative for appetite change, chills and fever  HENT: Positive for dental problem   Negative for congestion, ear pain, facial swelling, mouth sores, postnasal drip, rhinorrhea, sinus pressure, sore throat and trouble swallowing  Eyes: Negative  Respiratory: Negative  Negative for cough, chest tightness, shortness of breath and wheezing  Cardiovascular: Negative  Gastrointestinal: Negative  Negative for constipation, diarrhea, nausea and vomiting  Genitourinary: Negative  Musculoskeletal: Negative  Negative for neck pain and neck stiffness  Skin: Negative  Negative for color change and wound  Neurological: Negative for dizziness, weakness, numbness and headaches  All other systems reviewed and are negative  Physical Exam  Physical Exam   Constitutional: He is oriented to person, place, and time  He appears well-developed and well-nourished  HENT:   Head: Normocephalic and atraumatic  Right Ear: External ear normal    Left Ear: External ear normal    Nose: Nose normal    Mouth/Throat: Oropharynx is clear and moist        Eyes: Pupils are equal, round, and reactive to light  Conjunctivae and EOM are normal    Neck: Normal range of motion  Neck supple  Cardiovascular: Normal rate and intact distal pulses  Pulmonary/Chest: Effort normal    spo2 is 96% indicating adequate oxygenation    Neurological: He is alert and oriented to person, place, and time  Skin: Skin is warm and dry  Capillary refill takes less than 2 seconds  Nursing note and vitals reviewed        Vital Signs  ED Triage Vitals [12/17/19 1531]   Temperature Pulse Respirations Blood Pressure SpO2   (!) 97 1 °F (36 2 °C) (!) 110 18 128/76 96 %      Temp Source Heart Rate Source Patient Position - Orthostatic VS BP Location FiO2 (%)   Tympanic Monitor Sitting Left arm --      Pain Score       Worst Possible Pain           Vitals:    12/17/19 1531   BP: 128/76   Pulse: (!) 110   Patient Position - Orthostatic VS: Sitting         Visual Acuity      ED Medications  Medications - No data to display    Diagnostic Studies  Results Reviewed     None                 No orders to display              Procedures  Procedures         ED Course                               MDM  Number of Diagnoses or Management Options  Dental abscess:   Dental caries:   Diagnosis management comments: Had a thorough discussion with patient regarding the importance of following up with a dentist   The dental clinic sheet was given to patient highly encouraged him to follow-up  Discussed with patient that his abscess is not improving as he used to get this drained by specialist   There is no distinct focal abscess noted at this time and nothing is drainable  It did open up and create a tract to the outside epidermal region that will occasionally drain per patient  He has no overlying cellulitis, no sublingual space tenderness  No fevers  Will prescribe anti-inflammatories and have patient follow up  Discussed with patient that no antibiotics are going to be given at this time as patient was recently on clindamycin and amoxicillin without improvement in this puts him at risk for C diff infections etc   Strict return precautions for any worsening including not limited to worsening swelling, difficulty swallowing or chewing, fevers etc   He understands that his symptoms may not improve until he follows up with a dentist for his chronic ongoing dental pain and abscess  Patient verbalizes understanding and agrees with the above assessment plan         Amount and/or Complexity of Data Reviewed  Review and summarize past medical records: yes  Independent visualization of images, tracings, or specimens: yes          Disposition  Final diagnoses:   Dental caries   Dental abscess     Time reflects when diagnosis was documented in both MDM as applicable and the Disposition within this note     Time User Action Codes Description Comment    12/17/2019  3:50 PM Jesus Harmon Add [K02 9] Dental caries     12/17/2019  3:50 PM Jesus Hoover [K04 7] Dental abscess       ED Disposition     ED Disposition Condition Date/Time Comment    Discharge Stable Tue Dec 17, 2019  3:49 PM Hank Tello discharge to home/self care  Follow-up Information     Follow up With Specialties Details Why Contact Info Additional [address] Emergency Department Emergency Medicine Go to  If symptoms worsen such as difficulty swallowing, fevers etc otherwise please follow up with the dental clinic as soon as possible  [address] [facility]  [phone] [facility], [place], [place], [address]          Patient's Medications   Discharge Prescriptions    IBUPROFEN (MOTRIN) 800 MG TABLET    Take 1 tablet (800 mg total) by mouth every 6 (six) hours as needed for moderate pain for up to 10 days       Start Date: 12/17/2019End Date: 12/27/2019       Order Dose: 800 mg       Quantity: 30 tablet    Refills: 0    PREDNISONE 20 MG TABLET    Take 2 tablets (40 mg total) by mouth daily for 5 days       Start Date: 12/17/2019End Date: 12/22/2019       Order Dose: 40 mg       Quantity: 10 tablet    Refills: 0     No discharge procedures on file      ED Provider  Electronically Signed by           Lamine Mishra PA-C  12/17/19 9299

## 2020-03-12 ENCOUNTER — HOSPITAL ENCOUNTER (EMERGENCY)
Facility: HOSPITAL | Age: 35
Discharge: HOME | End: 2020-03-12
Attending: EMERGENCY MEDICINE | Admitting: EMERGENCY MEDICINE
Payer: MEDICARE

## 2020-03-12 VITALS
RESPIRATION RATE: 20 BRPM | TEMPERATURE: 96.4 F | SYSTOLIC BLOOD PRESSURE: 141 MMHG | DIASTOLIC BLOOD PRESSURE: 91 MMHG | HEART RATE: 97 BPM | OXYGEN SATURATION: 99 %

## 2020-03-12 DIAGNOSIS — T40.1X1A HEROIN OVERDOSE (HCC): Primary | ICD-10-CM

## 2020-03-12 PROCEDURE — 99284 EMERGENCY DEPT VISIT MOD MDM: CPT

## 2020-03-12 PROCEDURE — 93005 ELECTROCARDIOGRAM TRACING: CPT

## 2020-03-12 PROCEDURE — 99282 EMERGENCY DEPT VISIT SF MDM: CPT | Performed by: EMERGENCY MEDICINE

## 2020-03-12 RX ORDER — NALOXONE HYDROCHLORIDE 1 MG/ML
2 INJECTION PARENTERAL ONCE
Status: DISCONTINUED | OUTPATIENT
Start: 2020-03-12 | End: 2020-03-12 | Stop reason: HOSPADM

## 2020-03-13 NOTE — ED PROVIDER NOTES
History  Chief Complaint   Patient presents with    Overdose - Accidental     pt denies any SI  States he was using heroin today and was given 2mg of narcan in the field     35 y/o male presents with overdose of heroin, he said he did 2 bags  Was unresponsive Given 2mg narcan by police  Denies alcohol use  No head injury no medical complaints  Not interested in rehab  Not suicidal or homicidal ideation  History provided by:  Patient   used: No        None       Past Medical History:   Diagnosis Date    ADHD (attention deficit hyperactivity disorder)     Hypertension     Inguinal hernia     Psychiatric disorder     anxiety, depression, social personality diorder       History reviewed  No pertinent surgical history  History reviewed  No pertinent family history  I have reviewed and agree with the history as documented  E-Cigarette/Vaping     E-Cigarette/Vaping Substances     Social History     Tobacco Use    Smoking status: Current Every Day Smoker     Packs/day: 2 00     Types: Cigarettes    Smokeless tobacco: Never Used   Substance Use Topics    Alcohol use: Yes    Drug use: Yes     Types: Cocaine, Heroin     Comment: states "I use it all"       Review of Systems   Constitutional: Negative  HENT: Negative  Eyes: Negative  Respiratory: Negative  Cardiovascular: Negative  Gastrointestinal: Negative  Endocrine: Negative  Genitourinary: Negative  Musculoskeletal: Negative  Skin: Negative  Allergic/Immunologic: Negative  Neurological: Negative  Hematological: Negative  Psychiatric/Behavioral: The patient is nervous/anxious  All other systems reviewed and are negative  Physical Exam  Physical Exam   Constitutional: He is oriented to person, place, and time  He appears well-developed and well-nourished  HENT:   Head: Normocephalic and atraumatic  Eyes: Pupils are equal, round, and reactive to light   EOM are normal    Neck: Normal range of motion  Neck supple  Cardiovascular: Normal rate and regular rhythm  Pulmonary/Chest: Effort normal and breath sounds normal    Abdominal: Soft  Bowel sounds are normal    Musculoskeletal: Normal range of motion  Neurological: He is alert and oriented to person, place, and time  He displays normal reflexes  No cranial nerve deficit or sensory deficit  He exhibits normal muscle tone  Coordination normal    Skin: Skin is warm and dry  Psychiatric: He has a normal mood and affect  Nursing note and vitals reviewed  Vital Signs  ED Triage Vitals [03/12/20 1954]   Temperature Pulse Respirations Blood Pressure SpO2   (!) 96 4 °F (35 8 °C) 97 20 141/91 99 %      Temp Source Heart Rate Source Patient Position - Orthostatic VS BP Location FiO2 (%)   Tympanic Monitor -- -- --      Pain Score       --           Vitals:    03/12/20 1954   BP: 141/91   Pulse: 97         Visual Acuity      ED Medications  Medications - No data to display    Diagnostic Studies  Results Reviewed     None                 No orders to display              Procedures  Procedures         ED Course                                 MDM  Number of Diagnoses or Management Options  Heroin overdose [facility]):   Patient Progress  Patient progress: stable        Disposition  Final diagnoses:   Heroin overdose ([address] )     Time reflects when diagnosis was documented in both MDM as applicable and the Disposition within this note     Time User Action Codes Description Comment    3/12/2020  8:23 PM Kamila Whitmore Add [T40 1X1A] Heroin overdose [facility])       ED Disposition     ED Disposition Condition Date/Time Comment    Discharge Stable Thu Mar 12, 2020  8:23 PM Hank Tello discharge to home/self care              Follow-up Information     Follow up With Specialties Details Why Contact Info Additional Information    [address] Emergency Department Emergency Medicine  If symptoms worsen [address] [id number]  [phone] [facility], [place], [place], [address]          There are no discharge medications for this patient  No discharge procedures on file      PDMP Review     None          ED Provider  Electronically Signed by           Amanda Zavaleta DO  03/17/20 6974

## 2020-03-19 LAB
ATRIAL RATE: 92 BPM
P AXIS: 64 DEGREES
PR INTERVAL: 148 MS
QRS AXIS: 75 DEGREES
QRSD INTERVAL: 96 MS
QT INTERVAL: 368 MS
QTC INTERVAL: 455 MS
T WAVE AXIS: 35 DEGREES
VENTRICULAR RATE: 92 BPM

## 2020-03-19 PROCEDURE — 93010 ELECTROCARDIOGRAM REPORT: CPT | Performed by: INTERNAL MEDICINE

## 2020-04-06 ENCOUNTER — TELEMEDICINE (OUTPATIENT)
Dept: FAMILY MEDICINE CLINIC | Facility: CLINIC | Age: 35
End: 2020-04-06
Payer: MEDICARE

## 2020-04-06 DIAGNOSIS — N52.9 ERECTILE DYSFUNCTION, UNSPECIFIED ERECTILE DYSFUNCTION TYPE: Primary | ICD-10-CM

## 2020-04-06 PROBLEM — F11.10 HEROIN ABUSE (HCC): Status: ACTIVE | Noted: 2020-04-06

## 2020-04-06 PROCEDURE — G2012 BRIEF CHECK IN BY MD/QHP: HCPCS | Performed by: FAMILY MEDICINE

## 2020-04-06 RX ORDER — SILDENAFIL 50 MG/1
50 TABLET, FILM COATED ORAL DAILY PRN
Qty: 10 TABLET | Refills: 2 | Status: SHIPPED | OUTPATIENT
Start: 2020-04-06 | End: 2020-06-29 | Stop reason: SDUPTHER

## 2020-06-29 DIAGNOSIS — N52.9 ERECTILE DYSFUNCTION, UNSPECIFIED ERECTILE DYSFUNCTION TYPE: ICD-10-CM

## 2020-06-29 RX ORDER — SILDENAFIL 50 MG/1
50 TABLET, FILM COATED ORAL DAILY PRN
Qty: 10 TABLET | Refills: 2 | Status: SHIPPED | OUTPATIENT
Start: 2020-06-29 | End: 2020-12-15 | Stop reason: SDUPTHER

## 2020-07-07 ENCOUNTER — OFFICE VISIT (OUTPATIENT)
Dept: FAMILY MEDICINE CLINIC | Facility: CLINIC | Age: 35
End: 2020-07-07
Payer: COMMERCIAL

## 2020-07-07 VITALS
TEMPERATURE: 97.3 F | HEIGHT: 72 IN | BODY MASS INDEX: 25.6 KG/M2 | WEIGHT: 189 LBS | HEART RATE: 74 BPM | DIASTOLIC BLOOD PRESSURE: 80 MMHG | SYSTOLIC BLOOD PRESSURE: 116 MMHG | OXYGEN SATURATION: 98 % | RESPIRATION RATE: 18 BRPM

## 2020-07-07 DIAGNOSIS — F17.200 CURRENT EVERY DAY SMOKER: ICD-10-CM

## 2020-07-07 DIAGNOSIS — R11.2 NAUSEA AND VOMITING IN ADULT: ICD-10-CM

## 2020-07-07 DIAGNOSIS — R10.11 RIGHT UPPER QUADRANT PAIN: ICD-10-CM

## 2020-07-07 DIAGNOSIS — R06.2 WHEEZING: ICD-10-CM

## 2020-07-07 DIAGNOSIS — R10.33 PERIUMBILICAL ABDOMINAL PAIN: Primary | ICD-10-CM

## 2020-07-07 DIAGNOSIS — R74.8 ELEVATED LIVER ENZYMES: ICD-10-CM

## 2020-07-07 PROCEDURE — 4004F PT TOBACCO SCREEN RCVD TLK: CPT | Performed by: FAMILY MEDICINE

## 2020-07-07 PROCEDURE — 99213 OFFICE O/P EST LOW 20 MIN: CPT | Performed by: FAMILY MEDICINE

## 2020-07-07 PROCEDURE — 3008F BODY MASS INDEX DOCD: CPT | Performed by: FAMILY MEDICINE

## 2020-07-07 RX ORDER — FEXOFENADINE HYDROCHLORIDE 60 MG/1
60 TABLET, FILM COATED ORAL
COMMUNITY
Start: 2019-01-14 | End: 2020-12-08

## 2020-07-07 RX ORDER — BENZONATATE 100 MG/1
CAPSULE ORAL
COMMUNITY
Start: 2019-01-14 | End: 2020-12-08

## 2020-07-07 RX ORDER — ALBUTEROL SULFATE 90 UG/1
2 AEROSOL, METERED RESPIRATORY (INHALATION) EVERY 6 HOURS PRN
Qty: 1 INHALER | Refills: 5 | Status: SHIPPED | OUTPATIENT
Start: 2020-07-07 | End: 2020-12-08

## 2020-07-07 RX ORDER — ONDANSETRON 4 MG/1
4 TABLET, FILM COATED ORAL EVERY 8 HOURS PRN
Qty: 20 TABLET | Refills: 0 | Status: SHIPPED | OUTPATIENT
Start: 2020-07-07 | End: 2020-08-03

## 2020-07-07 RX ORDER — AZITHROMYCIN 250 MG/1
250 TABLET, FILM COATED ORAL
COMMUNITY
Start: 2019-01-14 | End: 2020-12-08

## 2020-07-07 RX ORDER — NICOTINE 21 MG/24HR
1 PATCH, TRANSDERMAL 24 HOURS TRANSDERMAL EVERY 24 HOURS
Qty: 28 PATCH | Refills: 0 | Status: SHIPPED | OUTPATIENT
Start: 2020-07-07 | End: 2020-12-08

## 2020-07-07 NOTE — PROGRESS NOTES
Assessment/Plan:      Diagnoses and all orders for this visit:    Periumbilical abdominal pain  Ongoing for years now  Unlikely acute abdomen given no peritoneal signs and patient appears  relatively comfortable during examination and only complains of tenderness to palpation of epigastric RUQ and RLQ  Still a CT of his abdomen from march of 2017 advised the patient to follow up with GI due to concerns for inflammatory bowel disease therefore referral to GI will be done  Advised patient that if he progresses to intractable nausea, vomiting, abdominal pain or begins to spike high fevers than to seek medical attention ASAP  Patient acknowledged understanding  Nausea and vomiting in adult  -     ondansetron (ZOFRAN) 4 mg tablet; Take 1 tablet (4 mg total) by mouth every 8 (eight) hours as needed for nausea or vomiting    Elevated liver enzymes  -     Comprehensive metabolic panel; Future  -     Comprehensive metabolic panel    Right upper quadrant pain  -     Comprehensive metabolic panel; Future  -     Comprehensive metabolic panel    Wheezing  -     albuterol (PROVENTIL HFA,VENTOLIN HFA) 90 mcg/act inhaler; Inhale 2 puffs every 6 (six) hours as needed for wheezing or shortness of breath    Current every day smoker  -     nicotine (NICODERM CQ) 21 mg/24 hr TD 24 hr patch; Place 1 patch on the skin every 24 hours    Other orders  -     azithromycin (ZITHROMAX) 250 mg tablet; Take 250 mg by mouth  -     benzonatate (TESSALON PERLES) 100 mg capsule; Take 1 capsule 3 times daily as needed for coughing, swallow capsules whole  -     fexofenadine (ALLEGRA) 60 MG tablet; Take 60 mg by mouth          Subjective:     Patient ID: Scott Morales is a 36 y o  male  HPI     Scott Morales is a 36 y o  male who presents for evaluation of abdominal pain  Onset was 3 days ago  Symptoms have been gradually worsening  The pain is described as cramping and twisting   Pain is located in the epigastric region with radiation to right upper quadrant  Aggravating factors: Patient unsure  Alleviating factors: Patient reports it will just "eventually go away " Associated symptoms: nausea and vomiting  The patient denies anorexia, arthralagias, belching, chills, constipation, diarrhea, dysuria, fever, flatus, frequency, headache, hematochezia, hematuria, melena, myalgias and sweats  Review of Systems  Per HPI    Objective:    Vitals:    07/07/20 1256   BP: 116/80   BP Location: Left arm   Patient Position: Sitting   Pulse: 74   Resp: 18   Temp: (!) 97 3 °F (36 3 °C)   TempSrc: Tympanic   SpO2: 98%   Weight: 85 7 kg (189 lb)   Height: 6' (1 829 m)          Physical Exam   Constitutional: He is oriented to person, place, and time  He appears well-developed  HENT:   Head: Normocephalic and atraumatic  Neck: Neck supple  Cardiovascular: Normal rate, regular rhythm, normal heart sounds and intact distal pulses  Pulmonary/Chest: Effort normal and breath sounds normal    Abdominal: Soft  Bowel sounds are normal  He exhibits no distension and no mass  There is no hepatosplenomegaly  There is tenderness in the right upper quadrant, right lower quadrant and epigastric area  There is no rigidity, no rebound, no guarding, no CVA tenderness, no tenderness at McBurney's point and negative Lopez's sign  No hernia  Neurological: He is alert and oriented to person, place, and time  Skin: Capillary refill takes less than 2 seconds  Psychiatric: He has a normal mood and affect

## 2020-08-03 DIAGNOSIS — R11.2 NAUSEA AND VOMITING IN ADULT: ICD-10-CM

## 2020-08-03 RX ORDER — ONDANSETRON 4 MG/1
TABLET, FILM COATED ORAL
Qty: 20 TABLET | Refills: 0 | Status: SHIPPED | OUTPATIENT
Start: 2020-08-03 | End: 2021-01-22 | Stop reason: SDUPTHER

## 2020-08-03 NOTE — TELEPHONE ENCOUNTER
Looks like he is due for annual physical  Any chance we can get him to see me in August or September for this  Thanks!

## 2020-12-08 ENCOUNTER — HOSPITAL ENCOUNTER (EMERGENCY)
Facility: HOSPITAL | Age: 35
Discharge: HOME | End: 2020-12-08
Attending: EMERGENCY MEDICINE
Payer: COMMERCIAL

## 2020-12-08 VITALS
HEART RATE: 85 BPM | SYSTOLIC BLOOD PRESSURE: 155 MMHG | TEMPERATURE: 98.6 F | WEIGHT: 205 LBS | DIASTOLIC BLOOD PRESSURE: 80 MMHG | RESPIRATION RATE: 18 BRPM | BODY MASS INDEX: 27.8 KG/M2 | OXYGEN SATURATION: 98 %

## 2020-12-08 DIAGNOSIS — L03.114 CELLULITIS OF LEFT HAND: Primary | ICD-10-CM

## 2020-12-08 LAB
ALBUMIN SERPL BCP-MCNC: 4.1 G/DL (ref 3.5–5)
ALP SERPL-CCNC: 78 U/L (ref 46–116)
ALT SERPL W P-5'-P-CCNC: 111 U/L (ref 12–78)
ANION GAP SERPL CALCULATED.3IONS-SCNC: 9 MMOL/L (ref 4–13)
AST SERPL W P-5'-P-CCNC: 63 U/L (ref 5–45)
BASOPHILS # BLD AUTO: 0.04 THOUSANDS/ΜL (ref 0–0.1)
BASOPHILS NFR BLD AUTO: 1 % (ref 0–1)
BILIRUB SERPL-MCNC: 0.5 MG/DL (ref 0.2–1)
BUN SERPL-MCNC: 14 MG/DL (ref 5–25)
CALCIUM SERPL-MCNC: 9.4 MG/DL (ref 8.3–10.1)
CHLORIDE SERPL-SCNC: 101 MMOL/L (ref 100–108)
CO2 SERPL-SCNC: 28 MMOL/L (ref 21–32)
CREAT SERPL-MCNC: 0.82 MG/DL (ref 0.6–1.3)
EOSINOPHIL # BLD AUTO: 0.15 THOUSAND/ΜL (ref 0–0.61)
EOSINOPHIL NFR BLD AUTO: 2 % (ref 0–6)
ERYTHROCYTE [DISTWIDTH] IN BLOOD BY AUTOMATED COUNT: 12.1 % (ref 11.6–15.1)
GFR SERPL CREATININE-BSD FRML MDRD: 115 ML/MIN/1.73SQ M
GLUCOSE SERPL-MCNC: 86 MG/DL (ref 65–140)
HCT VFR BLD AUTO: 46.7 % (ref 36.5–49.3)
HGB BLD-MCNC: 14.8 G/DL (ref 12–17)
IMM GRANULOCYTES # BLD AUTO: 0.01 THOUSAND/UL (ref 0–0.2)
IMM GRANULOCYTES NFR BLD AUTO: 0 % (ref 0–2)
LYMPHOCYTES # BLD AUTO: 2.1 THOUSANDS/ΜL (ref 0.6–4.47)
LYMPHOCYTES NFR BLD AUTO: 28 % (ref 14–44)
MCH RBC QN AUTO: 30.2 PG (ref 26.8–34.3)
MCHC RBC AUTO-ENTMCNC: 31.7 G/DL (ref 31.4–37.4)
MCV RBC AUTO: 95 FL (ref 82–98)
MONOCYTES # BLD AUTO: 0.48 THOUSAND/ΜL (ref 0.17–1.22)
MONOCYTES NFR BLD AUTO: 6 % (ref 4–12)
NEUTROPHILS # BLD AUTO: 4.76 THOUSANDS/ΜL (ref 1.85–7.62)
NEUTS SEG NFR BLD AUTO: 63 % (ref 43–75)
NRBC BLD AUTO-RTO: 0 /100 WBCS
PLATELET # BLD AUTO: 188 THOUSANDS/UL (ref 149–390)
PMV BLD AUTO: 10.8 FL (ref 8.9–12.7)
POTASSIUM SERPL-SCNC: 4.4 MMOL/L (ref 3.5–5.3)
PROT SERPL-MCNC: 8.1 G/DL (ref 6.4–8.2)
RBC # BLD AUTO: 4.9 MILLION/UL (ref 3.88–5.62)
SODIUM SERPL-SCNC: 138 MMOL/L (ref 136–145)
WBC # BLD AUTO: 7.54 THOUSAND/UL (ref 4.31–10.16)

## 2020-12-08 PROCEDURE — 85025 COMPLETE CBC W/AUTO DIFF WBC: CPT | Performed by: EMERGENCY MEDICINE

## 2020-12-08 PROCEDURE — 96365 THER/PROPH/DIAG IV INF INIT: CPT

## 2020-12-08 PROCEDURE — 96375 TX/PRO/DX INJ NEW DRUG ADDON: CPT

## 2020-12-08 PROCEDURE — 96366 THER/PROPH/DIAG IV INF ADDON: CPT

## 2020-12-08 PROCEDURE — 99283 EMERGENCY DEPT VISIT LOW MDM: CPT

## 2020-12-08 PROCEDURE — 80053 COMPREHEN METABOLIC PANEL: CPT | Performed by: EMERGENCY MEDICINE

## 2020-12-08 PROCEDURE — 36415 COLL VENOUS BLD VENIPUNCTURE: CPT | Performed by: EMERGENCY MEDICINE

## 2020-12-08 PROCEDURE — 99285 EMERGENCY DEPT VISIT HI MDM: CPT | Performed by: EMERGENCY MEDICINE

## 2020-12-08 RX ORDER — ALBUTEROL SULFATE 90 UG/1
2 AEROSOL, METERED RESPIRATORY (INHALATION) EVERY 6 HOURS PRN
COMMUNITY
End: 2020-12-15 | Stop reason: SDUPTHER

## 2020-12-08 RX ORDER — MORPHINE SULFATE 4 MG/ML
4 INJECTION, SOLUTION INTRAMUSCULAR; INTRAVENOUS ONCE
Status: COMPLETED | OUTPATIENT
Start: 2020-12-08 | End: 2020-12-08

## 2020-12-08 RX ORDER — CEPHALEXIN 500 MG/1
500 CAPSULE ORAL EVERY 6 HOURS SCHEDULED
Qty: 28 CAPSULE | Refills: 0 | Status: SHIPPED | OUTPATIENT
Start: 2020-12-08 | End: 2020-12-15 | Stop reason: SDUPTHER

## 2020-12-08 RX ORDER — OMEPRAZOLE 40 MG/1
1 CAPSULE, DELAYED RELEASE ORAL DAILY
COMMUNITY
Start: 2020-07-09 | End: 2021-05-19 | Stop reason: ALTCHOICE

## 2020-12-08 RX ORDER — CEFTRIAXONE 1 G/50ML
1000 INJECTION, SOLUTION INTRAVENOUS ONCE
Status: COMPLETED | OUTPATIENT
Start: 2020-12-08 | End: 2020-12-08

## 2020-12-08 RX ORDER — IBUPROFEN 600 MG/1
600 TABLET ORAL ONCE
Status: COMPLETED | OUTPATIENT
Start: 2020-12-08 | End: 2020-12-08

## 2020-12-08 RX ADMIN — CEFTRIAXONE 1000 MG: 1 INJECTION, SOLUTION INTRAVENOUS at 19:17

## 2020-12-08 RX ADMIN — MORPHINE SULFATE 4 MG: 4 INJECTION INTRAVENOUS at 20:09

## 2020-12-08 RX ADMIN — SODIUM CHLORIDE 1000 ML: 0.9 INJECTION, SOLUTION INTRAVENOUS at 19:17

## 2020-12-08 RX ADMIN — IBUPROFEN 600 MG: 600 TABLET ORAL at 21:42

## 2020-12-10 ENCOUNTER — HOSPITAL ENCOUNTER (EMERGENCY)
Facility: HOSPITAL | Age: 35
End: 2020-12-11
Attending: EMERGENCY MEDICINE
Payer: COMMERCIAL

## 2020-12-10 DIAGNOSIS — L02.519 HAND ABSCESS: ICD-10-CM

## 2020-12-10 DIAGNOSIS — L02.91 ABSCESS: Primary | ICD-10-CM

## 2020-12-10 DIAGNOSIS — L03.90 CELLULITIS: ICD-10-CM

## 2020-12-10 PROCEDURE — 99284 EMERGENCY DEPT VISIT MOD MDM: CPT

## 2020-12-11 ENCOUNTER — HOSPITAL ENCOUNTER (INPATIENT)
Facility: HOSPITAL | Age: 35
LOS: 1 days | Discharge: LEFT BEFORE DISCHARGE | DRG: 383 | End: 2020-12-11
Attending: FAMILY MEDICINE | Admitting: FAMILY MEDICINE
Payer: COMMERCIAL

## 2020-12-11 VITALS
HEART RATE: 53 BPM | TEMPERATURE: 97.9 F | SYSTOLIC BLOOD PRESSURE: 130 MMHG | OXYGEN SATURATION: 98 % | WEIGHT: 206.13 LBS | HEIGHT: 72 IN | BODY MASS INDEX: 27.92 KG/M2 | DIASTOLIC BLOOD PRESSURE: 72 MMHG | RESPIRATION RATE: 20 BRPM

## 2020-12-11 VITALS
OXYGEN SATURATION: 99 % | WEIGHT: 206 LBS | DIASTOLIC BLOOD PRESSURE: 68 MMHG | HEART RATE: 60 BPM | TEMPERATURE: 97.4 F | SYSTOLIC BLOOD PRESSURE: 129 MMHG | RESPIRATION RATE: 16 BRPM | BODY MASS INDEX: 27.9 KG/M2 | HEIGHT: 72 IN

## 2020-12-11 DIAGNOSIS — L02.511 ABSCESS OF RIGHT HAND: ICD-10-CM

## 2020-12-11 DIAGNOSIS — F11.10 HEROIN ABUSE (HCC): Primary | ICD-10-CM

## 2020-12-11 LAB
ALBUMIN SERPL BCP-MCNC: 3.7 G/DL (ref 3.5–5)
ALP SERPL-CCNC: 78 U/L (ref 46–116)
ALT SERPL W P-5'-P-CCNC: 96 U/L (ref 12–78)
ANION GAP SERPL CALCULATED.3IONS-SCNC: 10 MMOL/L (ref 4–13)
AST SERPL W P-5'-P-CCNC: 52 U/L (ref 5–45)
BASOPHILS # BLD AUTO: 0.03 THOUSANDS/ΜL (ref 0–0.1)
BASOPHILS NFR BLD AUTO: 0 % (ref 0–1)
BILIRUB SERPL-MCNC: 0.3 MG/DL (ref 0.2–1)
BUN SERPL-MCNC: 11 MG/DL (ref 5–25)
CALCIUM SERPL-MCNC: 8.7 MG/DL (ref 8.3–10.1)
CHLORIDE SERPL-SCNC: 99 MMOL/L (ref 100–108)
CO2 SERPL-SCNC: 30 MMOL/L (ref 21–32)
CREAT SERPL-MCNC: 1.08 MG/DL (ref 0.6–1.3)
EOSINOPHIL # BLD AUTO: 0.32 THOUSAND/ΜL (ref 0–0.61)
EOSINOPHIL NFR BLD AUTO: 4 % (ref 0–6)
ERYTHROCYTE [DISTWIDTH] IN BLOOD BY AUTOMATED COUNT: 12 % (ref 11.6–15.1)
FLUAV RNA RESP QL NAA+PROBE: NEGATIVE
FLUBV RNA RESP QL NAA+PROBE: NEGATIVE
GFR SERPL CREATININE-BSD FRML MDRD: 89 ML/MIN/1.73SQ M
GLUCOSE SERPL-MCNC: 99 MG/DL (ref 65–140)
HCT VFR BLD AUTO: 40.3 % (ref 36.5–49.3)
HGB BLD-MCNC: 13.2 G/DL (ref 12–17)
IMM GRANULOCYTES # BLD AUTO: 0.02 THOUSAND/UL (ref 0–0.2)
IMM GRANULOCYTES NFR BLD AUTO: 0 % (ref 0–2)
LYMPHOCYTES # BLD AUTO: 2.74 THOUSANDS/ΜL (ref 0.6–4.47)
LYMPHOCYTES NFR BLD AUTO: 37 % (ref 14–44)
MCH RBC QN AUTO: 30.7 PG (ref 26.8–34.3)
MCHC RBC AUTO-ENTMCNC: 32.8 G/DL (ref 31.4–37.4)
MCV RBC AUTO: 94 FL (ref 82–98)
MONOCYTES # BLD AUTO: 0.46 THOUSAND/ΜL (ref 0.17–1.22)
MONOCYTES NFR BLD AUTO: 6 % (ref 4–12)
NEUTROPHILS # BLD AUTO: 3.88 THOUSANDS/ΜL (ref 1.85–7.62)
NEUTS SEG NFR BLD AUTO: 53 % (ref 43–75)
NRBC BLD AUTO-RTO: 0 /100 WBCS
PLATELET # BLD AUTO: 136 THOUSANDS/UL (ref 149–390)
PLATELET # BLD AUTO: 189 THOUSANDS/UL (ref 149–390)
PMV BLD AUTO: 10.5 FL (ref 8.9–12.7)
PMV BLD AUTO: 10.9 FL (ref 8.9–12.7)
POTASSIUM SERPL-SCNC: 3.9 MMOL/L (ref 3.5–5.3)
PROT SERPL-MCNC: 7.7 G/DL (ref 6.4–8.2)
RBC # BLD AUTO: 4.3 MILLION/UL (ref 3.88–5.62)
RSV RNA RESP QL NAA+PROBE: NEGATIVE
SARS-COV-2 RNA RESP QL NAA+PROBE: NEGATIVE
SODIUM SERPL-SCNC: 139 MMOL/L (ref 136–145)
WBC # BLD AUTO: 7.45 THOUSAND/UL (ref 4.31–10.16)

## 2020-12-11 PROCEDURE — 96375 TX/PRO/DX INJ NEW DRUG ADDON: CPT

## 2020-12-11 PROCEDURE — NC001 PR NO CHARGE: Performed by: PLASTIC SURGERY

## 2020-12-11 PROCEDURE — 87070 CULTURE OTHR SPECIMN AEROBIC: CPT | Performed by: STUDENT IN AN ORGANIZED HEALTH CARE EDUCATION/TRAINING PROGRAM

## 2020-12-11 PROCEDURE — 96367 TX/PROPH/DG ADDL SEQ IV INF: CPT

## 2020-12-11 PROCEDURE — 85049 AUTOMATED PLATELET COUNT: CPT | Performed by: FAMILY MEDICINE

## 2020-12-11 PROCEDURE — 96365 THER/PROPH/DIAG IV INF INIT: CPT

## 2020-12-11 PROCEDURE — 87205 SMEAR GRAM STAIN: CPT | Performed by: STUDENT IN AN ORGANIZED HEALTH CARE EDUCATION/TRAINING PROGRAM

## 2020-12-11 PROCEDURE — 96376 TX/PRO/DX INJ SAME DRUG ADON: CPT

## 2020-12-11 PROCEDURE — 80053 COMPREHEN METABOLIC PANEL: CPT | Performed by: EMERGENCY MEDICINE

## 2020-12-11 PROCEDURE — 85025 COMPLETE CBC W/AUTO DIFF WBC: CPT | Performed by: EMERGENCY MEDICINE

## 2020-12-11 PROCEDURE — 0241U HB NFCT DS VIR RESP RNA 4 TRGT: CPT | Performed by: EMERGENCY MEDICINE

## 2020-12-11 PROCEDURE — 36415 COLL VENOUS BLD VENIPUNCTURE: CPT | Performed by: EMERGENCY MEDICINE

## 2020-12-11 PROCEDURE — 0H9FXZZ DRAINAGE OF RIGHT HAND SKIN, EXTERNAL APPROACH: ICD-10-PCS | Performed by: PLASTIC SURGERY

## 2020-12-11 PROCEDURE — 99252 IP/OBS CONSLTJ NEW/EST SF 35: CPT | Performed by: PLASTIC SURGERY

## 2020-12-11 PROCEDURE — 87040 BLOOD CULTURE FOR BACTERIA: CPT | Performed by: EMERGENCY MEDICINE

## 2020-12-11 PROCEDURE — 99285 EMERGENCY DEPT VISIT HI MDM: CPT | Performed by: EMERGENCY MEDICINE

## 2020-12-11 PROCEDURE — 99222 1ST HOSP IP/OBS MODERATE 55: CPT | Performed by: FAMILY MEDICINE

## 2020-12-11 RX ORDER — CEFTRIAXONE 2 G/50ML
2000 INJECTION, SOLUTION INTRAVENOUS ONCE
Status: COMPLETED | OUTPATIENT
Start: 2020-12-11 | End: 2020-12-11

## 2020-12-11 RX ORDER — NICOTINE 21 MG/24HR
1 PATCH, TRANSDERMAL 24 HOURS TRANSDERMAL DAILY
Status: DISCONTINUED | OUTPATIENT
Start: 2020-12-11 | End: 2020-12-11 | Stop reason: HOSPADM

## 2020-12-11 RX ORDER — OXYCODONE HYDROCHLORIDE 5 MG/1
2.5 TABLET ORAL ONCE
Status: DISCONTINUED | OUTPATIENT
Start: 2020-12-11 | End: 2020-12-11

## 2020-12-11 RX ORDER — KETOROLAC TROMETHAMINE 30 MG/ML
15 INJECTION, SOLUTION INTRAMUSCULAR; INTRAVENOUS EVERY 6 HOURS SCHEDULED
Status: DISCONTINUED | OUTPATIENT
Start: 2020-12-11 | End: 2020-12-11

## 2020-12-11 RX ORDER — MAGNESIUM HYDROXIDE/ALUMINUM HYDROXICE/SIMETHICONE 120; 1200; 1200 MG/30ML; MG/30ML; MG/30ML
30 SUSPENSION ORAL EVERY 6 HOURS PRN
Status: DISCONTINUED | OUTPATIENT
Start: 2020-12-11 | End: 2020-12-11 | Stop reason: HOSPADM

## 2020-12-11 RX ORDER — PANTOPRAZOLE SODIUM 40 MG/1
40 TABLET, DELAYED RELEASE ORAL
Status: DISCONTINUED | OUTPATIENT
Start: 2020-12-11 | End: 2020-12-11 | Stop reason: HOSPADM

## 2020-12-11 RX ORDER — ACETAMINOPHEN 325 MG/1
650 TABLET ORAL EVERY 6 HOURS PRN
Status: DISCONTINUED | OUTPATIENT
Start: 2020-12-11 | End: 2020-12-11

## 2020-12-11 RX ORDER — DIPHENHYDRAMINE HYDROCHLORIDE 50 MG/ML
50 INJECTION INTRAMUSCULAR; INTRAVENOUS ONCE
Status: DISCONTINUED | OUTPATIENT
Start: 2020-12-11 | End: 2020-12-11

## 2020-12-11 RX ORDER — METHADONE HYDROCHLORIDE 10 MG/ML
128 CONCENTRATE ORAL ONCE
Status: COMPLETED | OUTPATIENT
Start: 2020-12-11 | End: 2020-12-11

## 2020-12-11 RX ORDER — GABAPENTIN 100 MG/1
100 CAPSULE ORAL 3 TIMES DAILY
Status: DISCONTINUED | OUTPATIENT
Start: 2020-12-11 | End: 2020-12-11 | Stop reason: HOSPADM

## 2020-12-11 RX ORDER — KETOROLAC TROMETHAMINE 30 MG/ML
15 INJECTION, SOLUTION INTRAMUSCULAR; INTRAVENOUS EVERY 6 HOURS PRN
Status: DISCONTINUED | OUTPATIENT
Start: 2020-12-11 | End: 2020-12-11 | Stop reason: HOSPADM

## 2020-12-11 RX ORDER — VANCOMYCIN HYDROCHLORIDE 500 MG/100ML
500 INJECTION, SOLUTION INTRAVENOUS EVERY 12 HOURS
Status: DISCONTINUED | OUTPATIENT
Start: 2020-12-11 | End: 2020-12-11

## 2020-12-11 RX ORDER — ONDANSETRON 2 MG/ML
4 INJECTION INTRAMUSCULAR; INTRAVENOUS EVERY 6 HOURS PRN
Status: DISCONTINUED | OUTPATIENT
Start: 2020-12-11 | End: 2020-12-11 | Stop reason: HOSPADM

## 2020-12-11 RX ORDER — ACETAMINOPHEN 325 MG/1
975 TABLET ORAL EVERY 8 HOURS SCHEDULED
Status: DISCONTINUED | OUTPATIENT
Start: 2020-12-11 | End: 2020-12-11 | Stop reason: HOSPADM

## 2020-12-11 RX ORDER — KETOROLAC TROMETHAMINE 30 MG/ML
15 INJECTION, SOLUTION INTRAMUSCULAR; INTRAVENOUS ONCE
Status: COMPLETED | OUTPATIENT
Start: 2020-12-11 | End: 2020-12-11

## 2020-12-11 RX ORDER — HYDROXYZINE HYDROCHLORIDE 25 MG/1
25 TABLET, FILM COATED ORAL ONCE
Status: DISCONTINUED | OUTPATIENT
Start: 2020-12-11 | End: 2020-12-11 | Stop reason: HOSPADM

## 2020-12-11 RX ORDER — DOCUSATE SODIUM 100 MG/1
100 CAPSULE, LIQUID FILLED ORAL 2 TIMES DAILY
Status: DISCONTINUED | OUTPATIENT
Start: 2020-12-11 | End: 2020-12-11 | Stop reason: HOSPADM

## 2020-12-11 RX ADMIN — KETOROLAC TROMETHAMINE 15 MG: 30 INJECTION, SOLUTION INTRAMUSCULAR at 00:42

## 2020-12-11 RX ADMIN — MORPHINE SULFATE 2 MG: 2 INJECTION, SOLUTION INTRAMUSCULAR; INTRAVENOUS at 00:41

## 2020-12-11 RX ADMIN — CEFTRIAXONE 2000 MG: 2 INJECTION, SOLUTION INTRAVENOUS at 00:42

## 2020-12-11 RX ADMIN — VANCOMYCIN HYDROCHLORIDE 1500 MG: 1 INJECTION, POWDER, LYOPHILIZED, FOR SOLUTION INTRAVENOUS at 01:13

## 2020-12-11 RX ADMIN — VANCOMYCIN HYDROCHLORIDE 1500 MG: 10 INJECTION, POWDER, LYOPHILIZED, FOR SOLUTION INTRAVENOUS at 14:14

## 2020-12-11 RX ADMIN — Medication 1 PATCH: at 11:41

## 2020-12-11 RX ADMIN — ACETAMINOPHEN 975 MG: 325 TABLET, FILM COATED ORAL at 14:13

## 2020-12-11 RX ADMIN — LIDOCAINE HYDROCHLORIDE 10 ML: 10; .005 INJECTION, SOLUTION EPIDURAL; INFILTRATION; INTRACAUDAL; PERINEURAL at 14:14

## 2020-12-11 RX ADMIN — KETOROLAC TROMETHAMINE 15 MG: 30 INJECTION, SOLUTION INTRAMUSCULAR at 15:54

## 2020-12-11 RX ADMIN — METHADONE HYDROCHLORIDE 128 MG: 10 CONCENTRATE ORAL at 11:39

## 2020-12-11 RX ADMIN — MORPHINE SULFATE 2 MG: 2 INJECTION, SOLUTION INTRAMUSCULAR; INTRAVENOUS at 02:24

## 2020-12-14 ENCOUNTER — TRANSITIONAL CARE MANAGEMENT (OUTPATIENT)
Dept: FAMILY MEDICINE CLINIC | Facility: CLINIC | Age: 35
End: 2020-12-14

## 2020-12-14 LAB
BACTERIA BLD CULT: ABNORMAL
GRAM STN SPEC: ABNORMAL

## 2020-12-15 ENCOUNTER — OFFICE VISIT (OUTPATIENT)
Dept: FAMILY MEDICINE CLINIC | Facility: CLINIC | Age: 35
End: 2020-12-15
Payer: COMMERCIAL

## 2020-12-15 VITALS
SYSTOLIC BLOOD PRESSURE: 110 MMHG | TEMPERATURE: 99 F | WEIGHT: 210 LBS | HEIGHT: 72 IN | HEART RATE: 81 BPM | DIASTOLIC BLOOD PRESSURE: 72 MMHG | BODY MASS INDEX: 28.44 KG/M2 | OXYGEN SATURATION: 97 %

## 2020-12-15 DIAGNOSIS — F17.200 CURRENT EVERY DAY SMOKER: ICD-10-CM

## 2020-12-15 DIAGNOSIS — S61.401A OPEN WOUND OF RIGHT HAND WITHOUT FOREIGN BODY, UNSPECIFIED WOUND TYPE, INITIAL ENCOUNTER: ICD-10-CM

## 2020-12-15 DIAGNOSIS — N52.9 ERECTILE DYSFUNCTION, UNSPECIFIED ERECTILE DYSFUNCTION TYPE: ICD-10-CM

## 2020-12-15 DIAGNOSIS — J45.909 ASTHMA, UNSPECIFIED ASTHMA SEVERITY, UNSPECIFIED WHETHER COMPLICATED, UNSPECIFIED WHETHER PERSISTENT: Primary | ICD-10-CM

## 2020-12-15 DIAGNOSIS — L03.114 CELLULITIS OF LEFT HAND: ICD-10-CM

## 2020-12-15 LAB
BACTERIA WND AEROBE CULT: NO GROWTH
GRAM STN SPEC: NORMAL
GRAM STN SPEC: NORMAL

## 2020-12-15 PROCEDURE — 99213 OFFICE O/P EST LOW 20 MIN: CPT | Performed by: FAMILY MEDICINE

## 2020-12-15 RX ORDER — NICOTINE 21 MG/24HR
1 PATCH, TRANSDERMAL 24 HOURS TRANSDERMAL EVERY 24 HOURS
Qty: 28 PATCH | Refills: 0 | Status: SHIPPED | OUTPATIENT
Start: 2020-12-15 | End: 2021-03-16

## 2020-12-15 RX ORDER — SILDENAFIL 50 MG/1
50 TABLET, FILM COATED ORAL DAILY PRN
Qty: 10 TABLET | Refills: 2 | Status: SHIPPED | OUTPATIENT
Start: 2020-12-15 | End: 2022-02-03 | Stop reason: SDUPTHER

## 2020-12-15 RX ORDER — CEPHALEXIN 500 MG/1
500 CAPSULE ORAL EVERY 6 HOURS SCHEDULED
Qty: 28 CAPSULE | Refills: 0 | Status: SHIPPED | OUTPATIENT
Start: 2020-12-15 | End: 2020-12-22

## 2020-12-15 RX ORDER — ALBUTEROL SULFATE 90 UG/1
2 AEROSOL, METERED RESPIRATORY (INHALATION) EVERY 6 HOURS PRN
Qty: 1 INHALER | Refills: 5 | Status: SHIPPED | OUTPATIENT
Start: 2020-12-15 | End: 2021-10-15 | Stop reason: SDUPTHER

## 2020-12-15 RX ORDER — IBUPROFEN 600 MG/1
600 TABLET ORAL EVERY 6 HOURS PRN
Qty: 60 TABLET | Refills: 1 | Status: SHIPPED | OUTPATIENT
Start: 2020-12-15 | End: 2021-03-16

## 2020-12-15 RX ORDER — SUCRALFATE 1 G/1
TABLET ORAL
COMMUNITY
Start: 2020-07-09 | End: 2021-05-19

## 2020-12-16 LAB — BACTERIA BLD CULT: NORMAL

## 2020-12-18 ENCOUNTER — OFFICE VISIT (OUTPATIENT)
Dept: FAMILY MEDICINE CLINIC | Facility: CLINIC | Age: 35
End: 2020-12-18
Payer: COMMERCIAL

## 2020-12-18 VITALS
HEART RATE: 81 BPM | BODY MASS INDEX: 28.04 KG/M2 | OXYGEN SATURATION: 97 % | RESPIRATION RATE: 16 BRPM | DIASTOLIC BLOOD PRESSURE: 72 MMHG | TEMPERATURE: 99.3 F | HEIGHT: 72 IN | WEIGHT: 207 LBS | SYSTOLIC BLOOD PRESSURE: 118 MMHG

## 2020-12-18 DIAGNOSIS — S61.411D LACERATION OF RIGHT HAND WITHOUT FOREIGN BODY, SUBSEQUENT ENCOUNTER: Primary | ICD-10-CM

## 2020-12-18 PROBLEM — S61.411A LACERATION OF RIGHT HAND WITHOUT FOREIGN BODY: Status: ACTIVE | Noted: 2020-12-18

## 2020-12-18 LAB
BACTERIA SPEC AEROBE CULT: NORMAL
Lab: NORMAL

## 2020-12-18 PROCEDURE — 1111F DSCHRG MED/CURRENT MED MERGE: CPT | Performed by: FAMILY MEDICINE

## 2020-12-18 PROCEDURE — 99213 OFFICE O/P EST LOW 20 MIN: CPT | Performed by: FAMILY MEDICINE

## 2020-12-21 ENCOUNTER — OFFICE VISIT (OUTPATIENT)
Dept: WOUND CARE | Facility: HOSPITAL | Age: 35
End: 2020-12-21
Payer: COMMERCIAL

## 2020-12-21 VITALS
RESPIRATION RATE: 20 BRPM | DIASTOLIC BLOOD PRESSURE: 87 MMHG | HEIGHT: 72 IN | SYSTOLIC BLOOD PRESSURE: 136 MMHG | WEIGHT: 207 LBS | TEMPERATURE: 95.5 F | HEART RATE: 76 BPM | BODY MASS INDEX: 28.04 KG/M2

## 2020-12-21 DIAGNOSIS — S61.411A LACERATION OF RIGHT HAND WITHOUT FOREIGN BODY, INITIAL ENCOUNTER: Primary | ICD-10-CM

## 2020-12-21 PROCEDURE — 97597 DBRDMT OPN WND 1ST 20 CM/<: CPT | Performed by: NURSE PRACTITIONER

## 2020-12-21 PROCEDURE — 99213 OFFICE O/P EST LOW 20 MIN: CPT | Performed by: NURSE PRACTITIONER

## 2020-12-21 PROCEDURE — 99203 OFFICE O/P NEW LOW 30 MIN: CPT | Performed by: NURSE PRACTITIONER

## 2020-12-21 RX ORDER — LIDOCAINE HYDROCHLORIDE 40 MG/ML
5 SOLUTION TOPICAL ONCE
Status: COMPLETED | OUTPATIENT
Start: 2020-12-21 | End: 2020-12-21

## 2020-12-21 RX ADMIN — LIDOCAINE HYDROCHLORIDE 5 ML: 40 SOLUTION TOPICAL at 10:09

## 2021-01-05 ENCOUNTER — OFFICE VISIT (OUTPATIENT)
Dept: FAMILY MEDICINE CLINIC | Facility: CLINIC | Age: 36
End: 2021-01-05
Payer: COMMERCIAL

## 2021-01-05 VITALS
TEMPERATURE: 97.2 F | SYSTOLIC BLOOD PRESSURE: 116 MMHG | DIASTOLIC BLOOD PRESSURE: 74 MMHG | BODY MASS INDEX: 28.31 KG/M2 | HEART RATE: 68 BPM | OXYGEN SATURATION: 96 % | WEIGHT: 209 LBS | HEIGHT: 72 IN | RESPIRATION RATE: 16 BRPM

## 2021-01-05 DIAGNOSIS — Z00.00 ANNUAL PHYSICAL EXAM: Primary | ICD-10-CM

## 2021-01-05 DIAGNOSIS — Z23 ENCOUNTER FOR IMMUNIZATION: ICD-10-CM

## 2021-01-05 DIAGNOSIS — E29.1 HYPOGONADISM IN MALE: ICD-10-CM

## 2021-01-05 DIAGNOSIS — Z11.4 ENCOUNTER FOR SCREENING FOR HIV: ICD-10-CM

## 2021-01-05 DIAGNOSIS — N52.9 ERECTILE DYSFUNCTION, UNSPECIFIED ERECTILE DYSFUNCTION TYPE: ICD-10-CM

## 2021-01-05 PROCEDURE — 3008F BODY MASS INDEX DOCD: CPT | Performed by: FAMILY MEDICINE

## 2021-01-05 PROCEDURE — 99395 PREV VISIT EST AGE 18-39: CPT | Performed by: FAMILY MEDICINE

## 2021-01-05 NOTE — PATIENT INSTRUCTIONS

## 2021-01-05 NOTE — PROGRESS NOTES
[address] [facility]    NAME: Roxana Tello  AGE: 30 y o  SEX: male  : 1985     DATE: 2021     Assessment and Plan:     Problem List Items Addressed This Visit        Other    Erectile dysfunction    Relevant Orders    Testosterone, free, total    Prolactin    Ambulatory referral to Urology      Other Visit Diagnoses     Annual physical exam    -  Primary    Encounter for immunization        Hypogonadism in male        Relevant Orders    Testosterone, free, total    Prolactin    Encounter for screening for HIV        Relevant Orders    HIV 1/2 Antigen/Antibody (4th Generation) w Reflex [facility]          Immunizations and preventive care screenings were discussed with patient today  Appropriate education was printed on patient's after visit summary  Counseling:  Alcohol/drug use: discussed moderation in alcohol intake, the recommendations for healthy alcohol use, and avoidance of illicit drug use  Dental Health: discussed importance of regular tooth brushing, flossing, and dental visits  Injury prevention: discussed safety/seat belts, safety helmets, smoke detectors, carbon dioxide detectors, and smoking near bedding or upholstery  Sexual health: discussed sexually transmitted diseases, partner selection, use of condoms, avoidance of unintended pregnancy, and contraceptive alternatives  · Exercise: the importance of regular exercise/physical activity was discussed  Recommend exercise 3-5 times per week for at least 30 minutes  BMI Counseling: Body mass index is 28 35 kg/m²   The BMI is above normal  Nutrition recommendations include decreasing portion sizes, encouraging healthy choices of fruits and vegetables, decreasing fast food intake, consuming healthier snacks, limiting drinks that contain sugar, moderation in carbohydrate intake, increasing intake of lean protein, reducing intake of saturated and trans fat and reducing intake of cholesterol  Exercise recommendations include moderate physical activity 150 minutes/week  No pharmacotherapy was ordered  Patient referred to PCP due to patient being overweight  Depression Screening and Follow-up Plan: Clincally patient does not have depression  No treatment is required  Tobacco Cessation Counseling: Tobacco cessation counseling was provided  Medication options and side effects of medication discussed  Patient agreed to medication  Nicotine patch was prescribed  Patient down to 5 cigs a day now  He is trying to go without patches at this time but has them ordered in case needed  I will call him with results of his lab work and he will follow up with me as needed or for his next annual physical in one years time  No follow-ups on file  Chief Complaint:     Chief Complaint   Patient presents with    Physical Exam      History of Present Illness:       Adult Annual Physical   Patient here for a comprehensive physical exam  Works at [employer], lives with wife  Has child but who is under custody of another caregiver now and doesn't see them  Former opiate abuser now on methadone for over a year and compliant with medication  Only complains of erectile dysfunction  Onset of dysfunction was over a year   ago and was gradual in onset  Patient states the nature of difficulty is both attaining and maintaining erection  Full erections occur hardly ever  Partial erections occur while asleep  Libido is not affected  Risk factors for ED include opiate use of 10 years now on methadone  Patient denies history of cardiovascular disease, diabetes mellitus, neurologic disease, cranial, spinal, or pelvic trauma, pelvic radiation, urologic disease, beta blocker use, antihypertensive medications, and hypogonadism  Patient's expectations as to sexual function is that he would like to have sex with his wife    Patient's description of relationship with partner is good and he is still attracted to her  Previous treatment of ED includes NA  Diet and Physical Activity  · Diet/Nutrition: standard american diet   · Exercise: no formal exercise  Depression Screening  PHQ-9 Depression Screening    PHQ-9:   Frequency of the following problems over the past two weeks:           General Health  · Sleep: sleeps well  · Hearing: normal - bilateral   · Vision: no vision problems  · Dental: regular dental visits   Health  · History of STDs?: no      Review of Systems:     Review of Systems   Constitutional: Negative for chills and fever  HENT: Negative for sinus pressure, sinus pain and sore throat  Eyes: Negative for visual disturbance  Respiratory: Negative for shortness of breath and wheezing  Cardiovascular: Negative for chest pain, palpitations and leg swelling  Gastrointestinal: Negative for abdominal pain, constipation, diarrhea, nausea and vomiting  Genitourinary: Negative for decreased urine volume, difficulty urinating, discharge, dysuria, enuresis, flank pain, frequency, genital sores, hematuria, penile pain, penile swelling, scrotal swelling, testicular pain and urgency  Musculoskeletal: Positive for arthralgias and back pain  Negative for gait problem, joint swelling, myalgias, neck pain and neck stiffness  Allergic/Immunologic: Negative for environmental allergies, food allergies and immunocompromised state  Neurological: Negative for tremors, syncope, light-headedness and headaches  Hematological: Negative for adenopathy  Does not bruise/bleed easily  Psychiatric/Behavioral: Negative for agitation  Past Medical History:     Past Medical History:   Diagnosis Date    ADHD (attention deficit hyperactivity disorder)     Hypertension     Inguinal hernia     Psychiatric disorder     anxiety, depression, social personality diorder      Past Surgical History:     No past surgical history on file     Social History:     E-Cigarette/Vaping    E-Cigarette Use Never User      E-Cigarette/Vaping Substances    Nicotine No     THC No     CBD No     Flavoring No     Other No     Unknown No      Social History     Socioeconomic History    Marital status: /Civil Union     Spouse name: Not on file    Number of children: 0    Years of education: 15    Highest education level: 12th grade   Occupational History    Occupation: order fulfiller   Social Needs    Financial resource strain: Not on file    Food insecurity     Worry: Not on file     Inability: Not on file   [employer] needs     Medical: Not on file     Non-medical: Not on file   Tobacco Use    Smoking status: Current Every Day Smoker     Packs/day: 2 00     Types: Cigarettes    Smokeless tobacco: Never Used   Substance and Sexual Activity    Alcohol use: Not Currently    Drug use: Yes     Types: Marijuana     Comment: states on methadone and smoke weed    Sexual activity: Not on file   Lifestyle    Physical activity     Days per week: Not on file     Minutes per session: Not on file    Stress: Not on file   Relationships    Social connections     Talks on phone: Not on file     Gets together: Not on file     Attends Buddhism service: Not on file     Active member of club or organization: Not on file     Attends meetings of clubs or organizations: Not on file     Relationship status: Not on file    Intimate partner violence     Fear of current or ex partner: Not on file     Emotionally abused: Not on file     Physically abused: Not on file     Forced sexual activity: Not on file   Other Topics Concern    Not on file   Social History Narrative    Not on file      Family History:     Family History   Problem Relation Age of Onset    Alcohol abuse Mother     Substance Abuse Mother     Hypertension Mother     Substance Abuse Father     Alcohol abuse Father     Heart disease Father     Arthritis Father     Mental illness Brother     Dementia Maternal Grandfather  Heart disease Maternal Grandfather     Diabetes Maternal Grandfather     Arthritis Maternal Grandfather       Current Medications:     Current Outpatient Medications   Medication Sig Dispense Refill    albuterol (PROVENTIL HFA,VENTOLIN HFA) 90 mcg/act inhaler Inhale 2 puffs every 6 (six) hours as needed for wheezing 1 Inhaler 5    ibuprofen (MOTRIN) 600 mg tablet Take 1 tablet (600 mg total) by mouth every 6 (six) hours as needed for mild pain or moderate pain 60 tablet 1    METHADONE HCL PO Take 128 mg by mouth daily      nicotine (NICODERM CQ) 21 mg/24 hr TD 24 hr patch Place 1 patch on the skin every 24 hours 28 patch 0    omeprazole (PriLOSEC) 40 MG capsule Take 1 tablet by mouth daily      ondansetron (ZOFRAN) 4 mg tablet take 1 tablet by mouth every 8 hours if needed nausea and vomiting 20 tablet 0    sildenafil (VIAGRA) 50 MG tablet Take 1 tablet (50 mg total) by mouth daily as needed for erectile dysfunction 10 tablet 2    sucralfate (Carafate) 1 g tablet take 1 tablet by oral route 4 times every day on an empty stomach 1 hour before meals and at bedtime as needed for abdominal pain/heartburn       No current facility-administered medications for this visit  Allergies: Allergies   Allergen Reactions    Soybean Oil       Physical Exam:     /74   Pulse 68   Temp (!) 97 2 °F (36 2 °C)   Resp 16   Ht 6' (1 829 m)   Wt 94 8 kg (209 lb)   SpO2 96%   BMI 28 35 kg/m²     Physical Exam  Constitutional:       General: He is not in acute distress  Appearance: He is not ill-appearing, toxic-appearing or diaphoretic  HENT:      Head: Normocephalic and atraumatic  Right Ear: Tympanic membrane, ear canal and external ear normal       Left Ear: Tympanic membrane, ear canal and external ear normal       Nose: Nose normal       Mouth/Throat:      Mouth: Mucous membranes are moist    Eyes:      General: No scleral icterus  Right eye: No discharge           Left eye: No discharge  Extraocular Movements: Extraocular movements intact  Conjunctiva/sclera: Conjunctivae normal       Pupils: Pupils are equal, round, and reactive to light  Neck:      Musculoskeletal: Neck supple  No neck rigidity or muscular tenderness  Vascular: No carotid bruit  Cardiovascular:      Rate and Rhythm: Normal rate and regular rhythm  Pulses: Normal pulses  Heart sounds: No murmur  No friction rub  No gallop  Pulmonary:      Effort: Pulmonary effort is normal       Breath sounds: Normal breath sounds  Abdominal:      Palpations: Abdomen is soft  Hernia: There is no hernia in the left inguinal area or right inguinal area  Genitourinary:     Penis: Circumcised  No phimosis, paraphimosis, hypospadias, erythema, tenderness, discharge, swelling or lesions  Scrotum/Testes: Normal          Right: Mass, tenderness, swelling, testicular hydrocele or varicocele not present  Right testis is descended  Cremasteric reflex is present  Left: Mass, tenderness, swelling, testicular hydrocele or varicocele not present  Left testis is descended  Cremasteric reflex is present  Epididymis:      Right: Normal       Left: Normal    Musculoskeletal: Normal range of motion  General: No swelling, tenderness, deformity or signs of injury  Right lower leg: No edema  Left lower leg: No edema  Lymphadenopathy:      Cervical: No cervical adenopathy  Lower Body: No right inguinal adenopathy  No left inguinal adenopathy  Skin:     General: Skin is warm  Capillary Refill: Capillary refill takes less than 2 seconds  Neurological:      General: No focal deficit present  Mental Status: He is alert and oriented to person, place, and time  Cranial Nerves: No cranial nerve deficit  Sensory: No sensory deficit  Motor: No weakness        Coordination: Coordination normal       Gait: Gait normal       Deep Tendon Reflexes: Reflexes normal    Psychiatric:         Mood and Affect: Mood normal           Lonie Primrose, MD   [address]

## 2021-01-22 DIAGNOSIS — R11.2 NAUSEA AND VOMITING IN ADULT: ICD-10-CM

## 2021-01-22 RX ORDER — ONDANSETRON 4 MG/1
4 TABLET, FILM COATED ORAL EVERY 8 HOURS PRN
Qty: 20 TABLET | Refills: 0 | Status: SHIPPED | OUTPATIENT
Start: 2021-01-22 | End: 2021-03-16

## 2021-03-09 LAB
HIV 1+2 AB+HIV1 P24 AG SERPL QL IA: NON REACTIVE
TSH SERPL DL<=0.005 MIU/L-ACNC: 0.99 UIU/ML (ref 0.45–4.5)

## 2021-03-10 DIAGNOSIS — Z23 ENCOUNTER FOR IMMUNIZATION: ICD-10-CM

## 2021-03-10 LAB
PROLACTIN SERPL-MCNC: 5.8 NG/ML (ref 4–15.2)
TESTOST FREE SERPL-MCNC: 4.9 PG/ML (ref 8.7–25.1)
TESTOST SERPL-MCNC: 447 NG/DL (ref 264–916)

## 2021-03-11 ENCOUNTER — TELEMEDICINE (OUTPATIENT)
Dept: FAMILY MEDICINE CLINIC | Facility: CLINIC | Age: 36
End: 2021-03-11
Payer: COMMERCIAL

## 2021-03-11 DIAGNOSIS — E29.1 HYPOGONADISM IN MALE: Primary | ICD-10-CM

## 2021-03-11 PROCEDURE — 99213 OFFICE O/P EST LOW 20 MIN: CPT | Performed by: FAMILY MEDICINE

## 2021-03-12 NOTE — PROGRESS NOTES
Virtual Brief Visit    Assessment/Plan:    Problem List Items Addressed This Visit     None      Visit Diagnoses     Hypogonadism in male    -  Primary    Relevant Orders    Testosterone, Free    FSH and LH    PSA, total and free          Bethany Dc is a pleasant 30year old male with a past medical history of heroine abuse currently on methadone and current everyday smoker who presents to discuss hypogonadism lab work  He has low free testerone  This is all likely secondary to opiate induced hypogonadism  We will get repeat as well as a few other hormone labs to rule out central cause as well as PSA to rule out BPH  If Free testosterone is still low then we will start replacement therapy  Reason for visit is No chief complaint on file  Encounter provider Patricia Cho MD    Provider located at [address] [place] [id number]    Recent Visits  No visits were found meeting these conditions  Showing recent visits within past 7 days and meeting all other requirements     Future Appointments  No visits were found meeting these conditions  Showing future appointments within next 150 days and meeting all other requirements        After connecting through telephone, the patient was identified by name and date of birth  Adelita Laguna was informed that this is a telemedicine visit and that the visit is being conducted through telephone  My office door was closed  No one else was in the room  He acknowledged consent and understanding of privacy and security of the platform  The patient has agreed to participate and understands he can discontinue the visit at any time  Patient is aware this is a billable service  Hue Wasserman is a 30 y o  male      HPI     Bethany Dc is a pleasant 30year old male with a past medical history of heroine abuse currently on methadone and current everyday smoker who presents to discuss hypogonadism lab work  He has low free testerone  Total testosterone, TSH, prolactin were all within normal limits  He got lab work initially due to concerns about erectile dysfunction  He reports he still has this issue  He is currently being managed on suboxone for history of heroine abuse and is compliant with treatments and appointments  Past Medical History:   Diagnosis Date    ADHD (attention deficit hyperactivity disorder)     Hypertension     Inguinal hernia     Psychiatric disorder     anxiety, depression, social personality diorder       No past surgical history on file  Current Outpatient Medications   Medication Sig Dispense Refill    albuterol (PROVENTIL HFA,VENTOLIN HFA) 90 mcg/act inhaler Inhale 2 puffs every 6 (six) hours as needed for wheezing 1 Inhaler 5    ibuprofen (MOTRIN) 600 mg tablet Take 1 tablet (600 mg total) by mouth every 6 (six) hours as needed for mild pain or moderate pain 60 tablet 1    METHADONE HCL PO Take 128 mg by mouth daily      nicotine (NICODERM CQ) 21 mg/24 hr TD 24 hr patch Place 1 patch on the skin every 24 hours 28 patch 0    omeprazole (PriLOSEC) 40 MG capsule Take 1 tablet by mouth daily      ondansetron (ZOFRAN) 4 mg tablet Take 1 tablet (4 mg total) by mouth every 8 (eight) hours as needed for nausea or vomiting 20 tablet 0    sildenafil (VIAGRA) 50 MG tablet Take 1 tablet (50 mg total) by mouth daily as needed for erectile dysfunction 10 tablet 2    sucralfate (Carafate) 1 g tablet take 1 tablet by oral route 4 times every day on an empty stomach 1 hour before meals and at bedtime as needed for abdominal pain/heartburn       No current facility-administered medications for this visit  Allergies   Allergen Reactions    Soybean Oil        Review of Systems   Constitutional: Negative for activity change, appetite change, chills, fever and unexpected weight change  HENT: Negative for trouble swallowing      Respiratory: Negative for shortness of breath  Gastrointestinal: Negative for abdominal pain, constipation, diarrhea, nausea and vomiting  Endocrine: Negative for cold intolerance, heat intolerance, polydipsia, polyphagia and polyuria  Genitourinary: Negative for decreased urine volume, difficulty urinating, discharge, penile pain, testicular pain and urgency  Musculoskeletal: Negative for back pain  Neurological: Negative for dizziness, syncope, weakness, light-headedness, numbness and headaches  Psychiatric/Behavioral: Negative for agitation  There were no vitals filed for this visit  I spent 15 minutes directly with the patient during this visit    [facility] acknowledges that he has consented to an online visit or consultation  He understands that the online visit is based solely on information provided by him, and that, in the absence of a face-to-face physical evaluation by the physician, the diagnosis he receives is both limited and provisional in terms of accuracy and completeness  This is not intended to replace a full medical face-to-face evaluation by the physician  Luisa Castillo understands and accepts these terms

## 2021-03-14 LAB
FSH SERPL-ACNC: 4.4 MIU/ML (ref 1.5–12.4)
LH SERPL-ACNC: 2 MIU/ML (ref 1.7–8.6)
PSA FREE MFR SERPL: 20 %
PSA FREE SERPL-MCNC: 0.02 NG/ML
PSA SERPL-MCNC: 0.1 NG/ML (ref 0–4)

## 2021-03-15 ENCOUNTER — TELEPHONE (OUTPATIENT)
Dept: FAMILY MEDICINE CLINIC | Facility: CLINIC | Age: 36
End: 2021-03-15

## 2021-03-15 DIAGNOSIS — R11.2 NAUSEA AND VOMITING IN ADULT: ICD-10-CM

## 2021-03-15 DIAGNOSIS — L03.114 CELLULITIS OF LEFT HAND: ICD-10-CM

## 2021-03-15 DIAGNOSIS — S61.401A OPEN WOUND OF RIGHT HAND WITHOUT FOREIGN BODY, UNSPECIFIED WOUND TYPE, INITIAL ENCOUNTER: ICD-10-CM

## 2021-03-15 DIAGNOSIS — F17.200 CURRENT EVERY DAY SMOKER: ICD-10-CM

## 2021-03-16 RX ORDER — ONDANSETRON 4 MG/1
TABLET, FILM COATED ORAL
Qty: 20 TABLET | Refills: 0 | Status: SHIPPED | OUTPATIENT
Start: 2021-03-16 | End: 2021-04-21 | Stop reason: SDUPTHER

## 2021-03-16 RX ORDER — NICOTINE 21 MG/24HR
PATCH, TRANSDERMAL 24 HOURS TRANSDERMAL
Qty: 28 PATCH | Refills: 0 | Status: SHIPPED | OUTPATIENT
Start: 2021-03-16 | End: 2021-04-21 | Stop reason: SDUPTHER

## 2021-03-16 RX ORDER — CEPHALEXIN 500 MG/1
CAPSULE ORAL
Qty: 28 CAPSULE | Refills: 0 | OUTPATIENT
Start: 2021-03-16

## 2021-03-16 RX ORDER — IBUPROFEN 600 MG/1
TABLET ORAL
Qty: 60 TABLET | Refills: 1 | Status: SHIPPED | OUTPATIENT
Start: 2021-03-16 | End: 2021-05-19

## 2021-03-22 ENCOUNTER — TELEMEDICINE (OUTPATIENT)
Dept: FAMILY MEDICINE CLINIC | Facility: CLINIC | Age: 36
End: 2021-03-22

## 2021-03-22 DIAGNOSIS — E29.1 HYPOGONADISM IN MALE: ICD-10-CM

## 2021-03-22 DIAGNOSIS — E29.1 HYPOGONADISM IN MALE: Primary | ICD-10-CM

## 2021-03-22 PROCEDURE — 99213 OFFICE O/P EST LOW 20 MIN: CPT | Performed by: FAMILY MEDICINE

## 2021-03-22 RX ORDER — TESTOSTERONE GEL, 1% 10 MG/G
50 GEL TRANSDERMAL DAILY
Qty: 30 PACKET | Refills: 3 | Status: SHIPPED | OUTPATIENT
Start: 2021-03-22 | End: 2021-03-26

## 2021-03-22 RX ORDER — TESTOSTERONE GEL, 1% 10 MG/G
50 GEL TRANSDERMAL DAILY
Qty: 30 PACKET | Refills: 3 | OUTPATIENT
Start: 2021-03-22 | End: 2021-03-22 | Stop reason: SDUPTHER

## 2021-03-22 NOTE — PROGRESS NOTES
Virtual Brief Visit    30year old male with hypogonadism likely due to history of heroine abuse now on methadone comes to go over lab work and initiate testosterone therapy  No polycythemia on cbc  psa tsh lh and fsh wnl  Low free testosterone and low normal total testosterone  Will start androgel  Side effects reviewed  RTO in 3-4 months for recheck testosterone and symptom reassessment  Assessment/Plan:    Problem List Items Addressed This Visit     None      Visit Diagnoses     Hypogonadism in male    -  Primary    Relevant Medications    testosterone (AndroGel) 1%                Reason for visit is   Chief Complaint   Patient presents with    Virtual Brief Visit        Encounter provider Charla Garcia MD    Provider located at [address] [place] [id number]    Recent Visits  Date Type Provider Dept   03/15/21 Telephone [facility]   Showing recent visits within past 7 days and meeting all other requirements     Today's Visits  Date Type Provider Dept   03/22/21 Telemedicine Sam Dudley MD [facility]   Showing today's visits and meeting all other requirements     Future Appointments  No visits were found meeting these conditions  Showing future appointments within next 150 days and meeting all other requirements        After connecting through telephone, the patient was identified by name and date of birth  Buster Esteban was informed that this is a telemedicine visit and that the visit is being conducted through telephone  My office door was closed  No one else was in the room  He acknowledged consent and understanding of privacy and security of the platform  The patient has agreed to participate and understands he can discontinue the visit at any time  Patient is aware this is a billable service  Bogdan Contreras is a 30 y o  male      HPI     30year old male with hypogonadism likely due to history of heroine abuse now on methadone comes to go over lab work and initiate testosterone therapy  No polycythemia on cbc  psa tsh lh and fsh wnl  Low free testosterone and low normal total testosterone  He is interested in starting testosterone therapy at this time  Past Medical History:   Diagnosis Date    ADHD (attention deficit hyperactivity disorder)     Hypertension     Inguinal hernia     Psychiatric disorder     anxiety, depression, social personality diorder       No past surgical history on file  Current Outpatient Medications   Medication Sig Dispense Refill    albuterol (PROVENTIL HFA,VENTOLIN HFA) 90 mcg/act inhaler Inhale 2 puffs every 6 (six) hours as needed for wheezing 1 Inhaler 5    ibuprofen (MOTRIN) 600 mg tablet take 1 tablet by mouth every 6 hours 60 tablet 1    METHADONE HCL PO Take 128 mg by mouth daily      omeprazole (PriLOSEC) 40 MG capsule Take 1 tablet by mouth daily      ondansetron (ZOFRAN) 4 mg tablet take 1 tablet by mouth every 8 hours if needed :FOR NAUSEA OR VOMITING 20 tablet 0    RA Nicotine 21 MG/24HR TD 24 hr patch apply 1 patch to CLEAN, DRY, AND INTACT SKIN once daily REMOVE every evening and REPLACE every morning 28 patch 0    sildenafil (VIAGRA) 50 MG tablet Take 1 tablet (50 mg total) by mouth daily as needed for erectile dysfunction 10 tablet 2    sucralfate (Carafate) 1 g tablet take 1 tablet by oral route 4 times every day on an empty stomach 1 hour before meals and at bedtime as needed for abdominal pain/heartburn      testosterone (AndroGel) 1% Apply 1 packet (50 mg total) topically daily 30 packet 3     No current facility-administered medications for this visit  Allergies   Allergen Reactions    Soybean Oil        Review of Systems   Constitutional: Negative for chills, fever and unexpected weight change  HENT: Negative for trouble swallowing  Eyes: Negative for visual disturbance     Gastrointestinal: Negative for nausea and vomiting  Endocrine: Negative for polydipsia and polyphagia  Genitourinary: Negative for discharge, penile pain, penile swelling, scrotal swelling, testicular pain and urgency  Musculoskeletal: Negative for back pain and myalgias  Skin: Negative for color change  Neurological: Negative for syncope, light-headedness and headaches  Hematological: Does not bruise/bleed easily  Psychiatric/Behavioral: Negative for agitation  There were no vitals filed for this visit  I spent 15 minutes directly with the patient during this visit    [facility] acknowledges that he has consented to an online visit or consultation  He understands that the online visit is based solely on information provided by him, and that, in the absence of a face-to-face physical evaluation by the physician, the diagnosis he receives is both limited and provisional in terms of accuracy and completeness  This is not intended to replace a full medical face-to-face evaluation by the physician  Marychuy Hendrix understands and accepts these terms

## 2021-03-23 ENCOUNTER — TELEPHONE (OUTPATIENT)
Dept: FAMILY MEDICINE CLINIC | Facility: CLINIC | Age: 36
End: 2021-03-23

## 2021-03-23 NOTE — TELEPHONE ENCOUNTER
Medication send yesterday to pharmacy is not covered by insurance  We would have to find an alternative medication or a prior authorization will have to be started

## 2021-03-23 NOTE — TELEPHONE ENCOUNTER
Can we start prior authorization? It might help us point to other medication that would be covered as well thank you

## 2021-03-26 ENCOUNTER — NURSE TRIAGE (OUTPATIENT)
Dept: OTHER | Facility: OTHER | Age: 36
End: 2021-03-26

## 2021-03-26 DIAGNOSIS — E29.1 HYPOGONADISM IN MALE: Primary | ICD-10-CM

## 2021-03-26 DIAGNOSIS — E29.1 HYPOGONADISM IN MALE: ICD-10-CM

## 2021-03-26 RX ORDER — TESTOSTERONE 16.2 MG/G
40.5 GEL TRANSDERMAL EVERY MORNING
Qty: 60 ACTUATION | Refills: 3 | OUTPATIENT
Start: 2021-03-26 | End: 2021-03-26 | Stop reason: SDUPTHER

## 2021-03-26 RX ORDER — TESTOSTERONE 16.2 MG/G
40.5 GEL TRANSDERMAL EVERY MORNING
Qty: 60 ACTUATION | Refills: 3 | Status: SHIPPED | OUTPATIENT
Start: 2021-03-26 | End: 2021-04-07 | Stop reason: SDUPTHER

## 2021-03-27 NOTE — TELEPHONE ENCOUNTER
Regarding: medication problem  ----- Message from Hanny Mera sent at 3/26/2021  7:11 PM EDT -----  "My medication is not getting covered by my insurance again and I need it; Dr Shelly Tamez just sent it over to pharmacy and it is still not covered "

## 2021-03-27 NOTE — TELEPHONE ENCOUNTER
Reason for Disposition   Caller wants to use a complementary or alternative medicine    Answer Assessment - Initial Assessment Questions  1  NAME of MEDICATION: "What medicine are you calling about?"     Androgel Gel  2  QUESTION: "What is your question?"      Insurance is not covering it  3    PRESCRIBING HCP: "Who prescribed it?" Reason: if prescribed by specialist, call should be referred to that group  Dr Gudelia Rush  4  SYMPTOMS: "Do you have any symptoms?"      hypogonadism  5  SEVERITY: If symptoms are present, ask "Are they mild, moderate or severe?"      NA  6    PREGNANCY:  "Is there any chance that you are pregnant?" "When was your last menstrual period?"      NA    Protocols used: MEDICATION QUESTION CALL-ADULT-

## 2021-03-27 NOTE — TELEPHONE ENCOUNTER
Patient is calling because the pharmacy is telling him that a precert is needed from his insurance to get Androgel covered for him  I explained to him that that needs to be done when the office is open  He understood and he will call Monday  I made him aware that I would send a message to the office now

## 2021-03-29 DIAGNOSIS — E29.1 HYPOGONADISM IN MALE: ICD-10-CM

## 2021-03-29 NOTE — TELEPHONE ENCOUNTER
I called pharmacy and patient and got him generic prescription according to prior authorization form on Friday so all should be taken care of at this time  Thank you

## 2021-03-29 NOTE — TELEPHONE ENCOUNTER
Prior authorization denied  As per [facility], form given to Dr Kostas Murrieta which medications would be covered  Please review and advise  Thank you

## 2021-03-30 RX ORDER — TESTOSTERONE 16.2 MG/G
40.5 GEL TRANSDERMAL EVERY MORNING
Qty: 60 ACTUATION | Refills: 0 | Status: CANCELLED | OUTPATIENT
Start: 2021-03-30

## 2021-03-30 NOTE — TELEPHONE ENCOUNTER
Will approve medication refill  It appears that patient may need different medication that is approved by his insurance  I am currently working night float  Advised patient to schedule virtual appointment tomorrow if this refill isn't approved by his insurance

## 2021-03-31 NOTE — TELEPHONE ENCOUNTER
Prescriptions  Total Prescriptions: 0    Total Private Pay: 0    Fill Date ID   Written Drug Qty Days Prescriber Rx # Pharmacy Refill   Daily Dose* Pymt Type      *Per CDC guidance, the MME conversion factors prescribed or provided as part of the medication-assisted treatment for opioid use disorder should not be used to benchmark against dosage thresholds meant for opioids prescribed for pain  Buprenorphine products have no agreed upon morphine equivalency, and as partial opioid agonists, are not expected to be associated with overdose risk in the same dose-dependent manner as doses for full agonist opioids  MME = morphine milligram equivalents  LME = Lorazepam milligram equivalents  MG = dose in milligrams     Providers  Total Providers: 0   Name [employer] Phone   Pharmacies  Total Pharmacies: 0   Name [employer] [facility]

## 2021-04-06 DIAGNOSIS — E29.1 HYPOGONADISM IN MALE: ICD-10-CM

## 2021-04-06 NOTE — TELEPHONE ENCOUNTER
Patients wife called again  Explained Dr Will Wilcox is not in office right now and will most likely be able to address this shortly  Advised of 48-72 turn around

## 2021-04-06 NOTE — TELEPHONE ENCOUNTER
Patients wife called needing Rx for the testosterone (ANDROGEL) 1 62 % TD gel pump [facility] [phone]   Rickey Wills cannot transfer Rx- [facility] needs an original in order to fill rx

## 2021-04-07 DIAGNOSIS — E29.1 HYPOGONADISM IN MALE: ICD-10-CM

## 2021-04-07 PROBLEM — K44.9 HIATAL HERNIA: Status: ACTIVE | Noted: 2017-03-07

## 2021-04-07 PROBLEM — L02.511 ABSCESS OF RIGHT HAND: Status: RESOLVED | Noted: 2020-12-11 | Resolved: 2021-04-07

## 2021-04-07 PROBLEM — S61.411A LACERATION OF RIGHT HAND WITHOUT FOREIGN BODY: Status: RESOLVED | Noted: 2020-12-18 | Resolved: 2021-04-07

## 2021-04-07 PROBLEM — N20.0 RENAL CALCULUS, BILATERAL: Status: ACTIVE | Noted: 2017-03-07

## 2021-04-07 PROBLEM — K58.9 IRRITABLE BOWEL SYNDROME: Status: ACTIVE | Noted: 2017-03-07

## 2021-04-07 PROBLEM — N20.0 RENAL CALCULUS, BILATERAL: Status: RESOLVED | Noted: 2017-03-07 | Resolved: 2021-04-07

## 2021-04-07 PROBLEM — K57.30 COLON, DIVERTICULOSIS: Status: ACTIVE | Noted: 2017-03-07

## 2021-04-07 RX ORDER — TESTOSTERONE 16.2 MG/G
40.5 GEL TRANSDERMAL EVERY MORNING
Qty: 60 ACTUATION | Refills: 3 | Status: SHIPPED | OUTPATIENT
Start: 2021-04-07 | End: 2021-04-21 | Stop reason: SDUPTHER

## 2021-04-07 NOTE — TELEPHONE ENCOUNTER
testosterone (ANDROGEL) 1 62 % TD gel pump     Patient is asking for [facility] to [facility]     Patient came into the office very distressed that he still does not have it  Thank you!

## 2021-04-07 NOTE — TELEPHONE ENCOUNTER
Hi Dr Ashwini Mims  Patient needs this medication sent to pharmacy listed above ([address] in [place], [phone])  PDMP may need review to make sure patient did not fill prescription at [facility] pharmacy where prescription was originally sent to  Thank you    Maurice Perez

## 2021-04-21 DIAGNOSIS — E29.1 HYPOGONADISM IN MALE: ICD-10-CM

## 2021-04-21 DIAGNOSIS — F17.200 CURRENT EVERY DAY SMOKER: ICD-10-CM

## 2021-04-21 DIAGNOSIS — R11.2 NAUSEA AND VOMITING IN ADULT: ICD-10-CM

## 2021-04-21 RX ORDER — NICOTINE 21 MG/24HR
1 PATCH, TRANSDERMAL 24 HOURS TRANSDERMAL EVERY 24 HOURS
Qty: 28 PATCH | Refills: 1 | Status: SHIPPED | OUTPATIENT
Start: 2021-04-21 | End: 2021-07-27 | Stop reason: SDUPTHER

## 2021-04-21 RX ORDER — TESTOSTERONE 16.2 MG/G
40.5 GEL TRANSDERMAL EVERY MORNING
Qty: 60 ACTUATION | Refills: 3 | OUTPATIENT
Start: 2021-04-21

## 2021-04-21 RX ORDER — ONDANSETRON 4 MG/1
4 TABLET, FILM COATED ORAL EVERY 12 HOURS PRN
Qty: 20 TABLET | Refills: 0 | Status: SHIPPED | OUTPATIENT
Start: 2021-04-21 | End: 2021-05-28

## 2021-04-21 NOTE — TELEPHONE ENCOUNTER
Patients wife calls  Patient needs refill of:    - ondansetron (ZOFRAN) 4 mg tablet    - RA Nicotine 21 MG/24HR TD 24 hr patch     - testosterone (ANDROGEL) 1 62 % TD gel pump    Patient has changed back to [employer]  Updated preferred pharmacy

## 2021-04-22 ENCOUNTER — TELEMEDICINE (OUTPATIENT)
Dept: FAMILY MEDICINE CLINIC | Facility: CLINIC | Age: 36
End: 2021-04-22
Payer: COMMERCIAL

## 2021-04-22 DIAGNOSIS — E34.9 TESTOSTERONE DEFICIENCY: Primary | ICD-10-CM

## 2021-04-22 DIAGNOSIS — E29.1 HYPOGONADISM IN MALE: Primary | ICD-10-CM

## 2021-04-22 PROCEDURE — 99213 OFFICE O/P EST LOW 20 MIN: CPT | Performed by: FAMILY MEDICINE

## 2021-04-22 RX ORDER — TESTOSTERONE GEL, 1% 10 MG/G
50 GEL TRANSDERMAL DAILY
Qty: 30 PACKET | Refills: 1 | Status: SHIPPED | OUTPATIENT
Start: 2021-04-22 | End: 2021-04-29 | Stop reason: ALTCHOICE

## 2021-04-22 RX ORDER — TESTOSTERONE GEL, 1% 10 MG/G
50 GEL TRANSDERMAL DAILY
Status: CANCELLED | OUTPATIENT
Start: 2021-04-22

## 2021-04-22 RX ORDER — TESTOSTERONE 16.2 MG/G
40.5 GEL TRANSDERMAL EVERY MORNING
Qty: 60 ACTUATION | Refills: 0 | Status: SHIPPED | OUTPATIENT
Start: 2021-04-22 | End: 2021-04-22 | Stop reason: ALTCHOICE

## 2021-04-22 NOTE — PROGRESS NOTES
Virtual Brief Visit    Assessment/Plan:  The    Problem List Items Addressed This Visit     None      Visit Diagnoses     Hypogonadism in male    -  Primary    Testosterone AndroGel 1% filled by other means  Patient counseled on how to medication  Will recheck testosterone level in 1 month  Reason for visit is   Chief Complaint   Patient presents with    Virtual Brief Visit        Encounter provider Gilberto Webb DO    Provider located at [address] [place] [id number]    Recent Visits  No visits were found meeting these conditions  Showing recent visits within past 7 days and meeting all other requirements     Today's Visits  Date Type Provider Dept   04/22/21 Telemedicine Gilberto Webb DO [facility]   Showing today's visits and meeting all other requirements     Future Appointments  No visits were found meeting these conditions  Showing future appointments within next 150 days and meeting all other requirements        After connecting through telephone, the patient was identified by name and date of birth  Madeline Mary was informed that this is a telemedicine visit and that the visit is being conducted through telephone  My office door was closed  No one else was in the room  He acknowledged consent and understanding of privacy and security of the platform  The patient has agreed to participate and understands he can discontinue the visit at any time  It was my intent to perform this visit via video technology but the patient was not able to do a video connection so the visit was completed via audio telephone only  Patient is aware this is a billable service  Bonnie Roberts is a 30 y o  male   78-year-old male with a past medical history of recently diagnosed hypogonadism who is presenting today for evaluation via telemedicine to discuss his ongoing medical issue  Patient states he was recently prescribed testosterone AndroGel 1 62% which had been previously filled, but is currently unable to fill medication due to insurance issues  Patient is requesting for a new prescription that will be approved by his insurance  Patient denies any new concerns  Past Medical History:   Diagnosis Date    Abscess of right hand 12/11/2020    ADHD (attention deficit hyperactivity disorder)     Hypertension     Inguinal hernia     Laceration of right hand without foreign body 12/18/2020    Psychiatric disorder     anxiety, depression, social personality diorder    Renal calculus, bilateral 3/7/2017       No past surgical history on file  Current Outpatient Medications   Medication Sig Dispense Refill    albuterol (PROVENTIL HFA,VENTOLIN HFA) 90 mcg/act inhaler Inhale 2 puffs every 6 (six) hours as needed for wheezing 1 Inhaler 5    ibuprofen (MOTRIN) 600 mg tablet take 1 tablet by mouth every 6 hours 60 tablet 1    METHADONE HCL PO Take 128 mg by mouth daily      nicotine (RA Nicotine) 21 mg/24 hr TD 24 hr patch Place 1 patch on the skin every 24 hours 28 patch 1    omeprazole (PriLOSEC) 40 MG capsule Take 1 tablet by mouth daily      ondansetron (ZOFRAN) 4 mg tablet Take 1 tablet (4 mg total) by mouth every 12 (twelve) hours as needed for nausea or vomiting 20 tablet 0    sildenafil (VIAGRA) 50 MG tablet Take 1 tablet (50 mg total) by mouth daily as needed for erectile dysfunction 10 tablet 2    sucralfate (Carafate) 1 g tablet take 1 tablet by oral route 4 times every day on an empty stomach 1 hour before meals and at bedtime as needed for abdominal pain/heartburn      testosterone (AndroGel) 1% Apply 1 packet (50 mg total) topically daily 30 packet 1     No current facility-administered medications for this visit  Allergies   Allergen Reactions    Soybean Oil - Food Allergy        Review of Systems   Constitutional: Negative  HENT: Negative  Respiratory: Negative  Cardiovascular: Negative  Gastrointestinal: Negative  Genitourinary: Negative  Musculoskeletal: Negative  Neurological: Negative  There were no vitals filed for this visit  I spent 20 minutes directly with the patient during this visit    [facility] acknowledges that he has consented to an online visit or consultation  He understands that the online visit is based solely on information provided by him, and that, in the absence of a face-to-face physical evaluation by the physician, the diagnosis he receives is both limited and provisional in terms of accuracy and completeness  This is not intended to replace a full medical face-to-face evaluation by the physician  Lan Marie understands and accepts these terms

## 2021-04-26 ENCOUNTER — TELEPHONE (OUTPATIENT)
Dept: FAMILY MEDICINE CLINIC | Facility: CLINIC | Age: 36
End: 2021-04-26

## 2021-04-26 NOTE — TELEPHONE ENCOUNTER
Patient is requesting we process Pre Authorization as the in [employer] will not cover Rx (Testosterone AndroGel1%)

## 2021-04-26 NOTE — TELEPHONE ENCOUNTER
Pollyann Hamman I sent the scan denial form to Dr Juanita Callaway for other covered med or do per to per    Thanks

## 2021-04-28 ENCOUNTER — TELEPHONE (OUTPATIENT)
Dept: FAMILY MEDICINE CLINIC | Facility: CLINIC | Age: 36
End: 2021-04-28

## 2021-04-29 ENCOUNTER — APPOINTMENT (EMERGENCY)
Dept: RADIOLOGY | Facility: HOSPITAL | Age: 36
End: 2021-04-29
Payer: COMMERCIAL

## 2021-04-29 ENCOUNTER — HOSPITAL ENCOUNTER (EMERGENCY)
Facility: HOSPITAL | Age: 36
Discharge: HOME | End: 2021-04-30
Attending: EMERGENCY MEDICINE | Admitting: EMERGENCY MEDICINE
Payer: COMMERCIAL

## 2021-04-29 DIAGNOSIS — R11.10 VOMITING: ICD-10-CM

## 2021-04-29 DIAGNOSIS — E29.1 HYPOGONADISM IN MALE: Primary | ICD-10-CM

## 2021-04-29 DIAGNOSIS — R10.9 ABDOMINAL PAIN: Primary | ICD-10-CM

## 2021-04-29 LAB
ALBUMIN SERPL BCP-MCNC: 3.8 G/DL (ref 3.5–5)
ALP SERPL-CCNC: 53 U/L (ref 46–116)
ALT SERPL W P-5'-P-CCNC: 73 U/L (ref 12–78)
ANION GAP SERPL CALCULATED.3IONS-SCNC: 5 MMOL/L (ref 4–13)
AST SERPL W P-5'-P-CCNC: 33 U/L (ref 5–45)
BASOPHILS # BLD AUTO: 0.04 THOUSANDS/ΜL (ref 0–0.1)
BASOPHILS NFR BLD AUTO: 1 % (ref 0–1)
BILIRUB SERPL-MCNC: 0.29 MG/DL (ref 0.2–1)
BUN SERPL-MCNC: 6 MG/DL (ref 5–25)
CALCIUM SERPL-MCNC: 8.6 MG/DL (ref 8.3–10.1)
CHLORIDE SERPL-SCNC: 103 MMOL/L (ref 100–108)
CO2 SERPL-SCNC: 32 MMOL/L (ref 21–32)
CREAT SERPL-MCNC: 0.94 MG/DL (ref 0.6–1.3)
EOSINOPHIL # BLD AUTO: 0.25 THOUSAND/ΜL (ref 0–0.61)
EOSINOPHIL NFR BLD AUTO: 4 % (ref 0–6)
ERYTHROCYTE [DISTWIDTH] IN BLOOD BY AUTOMATED COUNT: 12.8 % (ref 11.6–15.1)
GFR SERPL CREATININE-BSD FRML MDRD: 105 ML/MIN/1.73SQ M
GLUCOSE SERPL-MCNC: 96 MG/DL (ref 65–140)
HCT VFR BLD AUTO: 42.5 % (ref 36.5–49.3)
HGB BLD-MCNC: 13.9 G/DL (ref 12–17)
IMM GRANULOCYTES # BLD AUTO: 0.01 THOUSAND/UL (ref 0–0.2)
IMM GRANULOCYTES NFR BLD AUTO: 0 % (ref 0–2)
LIPASE SERPL-CCNC: 32 U/L (ref 73–393)
LYMPHOCYTES # BLD AUTO: 3.14 THOUSANDS/ΜL (ref 0.6–4.47)
LYMPHOCYTES NFR BLD AUTO: 49 % (ref 14–44)
MCH RBC QN AUTO: 30.8 PG (ref 26.8–34.3)
MCHC RBC AUTO-ENTMCNC: 32.7 G/DL (ref 31.4–37.4)
MCV RBC AUTO: 94 FL (ref 82–98)
MONOCYTES # BLD AUTO: 0.35 THOUSAND/ΜL (ref 0.17–1.22)
MONOCYTES NFR BLD AUTO: 6 % (ref 4–12)
NEUTROPHILS # BLD AUTO: 2.5 THOUSANDS/ΜL (ref 1.85–7.62)
NEUTS SEG NFR BLD AUTO: 40 % (ref 43–75)
NRBC BLD AUTO-RTO: 0 /100 WBCS
PLATELET # BLD AUTO: 183 THOUSANDS/UL (ref 149–390)
PMV BLD AUTO: 10.2 FL (ref 8.9–12.7)
POTASSIUM SERPL-SCNC: 3.6 MMOL/L (ref 3.5–5.3)
PROT SERPL-MCNC: 7 G/DL (ref 6.4–8.2)
RBC # BLD AUTO: 4.52 MILLION/UL (ref 3.88–5.62)
SODIUM SERPL-SCNC: 140 MMOL/L (ref 136–145)
WBC # BLD AUTO: 6.29 THOUSAND/UL (ref 4.31–10.16)

## 2021-04-29 PROCEDURE — 99284 EMERGENCY DEPT VISIT MOD MDM: CPT

## 2021-04-29 PROCEDURE — 80053 COMPREHEN METABOLIC PANEL: CPT | Performed by: EMERGENCY MEDICINE

## 2021-04-29 PROCEDURE — 99284 EMERGENCY DEPT VISIT MOD MDM: CPT | Performed by: EMERGENCY MEDICINE

## 2021-04-29 PROCEDURE — 96361 HYDRATE IV INFUSION ADD-ON: CPT

## 2021-04-29 PROCEDURE — 83690 ASSAY OF LIPASE: CPT | Performed by: EMERGENCY MEDICINE

## 2021-04-29 PROCEDURE — 96374 THER/PROPH/DIAG INJ IV PUSH: CPT

## 2021-04-29 PROCEDURE — 85025 COMPLETE CBC W/AUTO DIFF WBC: CPT | Performed by: EMERGENCY MEDICINE

## 2021-04-29 PROCEDURE — 74177 CT ABD & PELVIS W/CONTRAST: CPT

## 2021-04-29 PROCEDURE — G1004 CDSM NDSC: HCPCS

## 2021-04-29 PROCEDURE — 36415 COLL VENOUS BLD VENIPUNCTURE: CPT | Performed by: EMERGENCY MEDICINE

## 2021-04-29 RX ORDER — ONDANSETRON 2 MG/ML
4 INJECTION INTRAMUSCULAR; INTRAVENOUS ONCE
Status: COMPLETED | OUTPATIENT
Start: 2021-04-29 | End: 2021-04-29

## 2021-04-29 RX ORDER — TESTOSTERONE 20.25 MG/1.25G
GEL TOPICAL
Qty: 88 G | Refills: 2 | Status: SHIPPED | OUTPATIENT
Start: 2021-04-29 | End: 2021-05-03

## 2021-04-29 RX ADMIN — ONDANSETRON 4 MG: 2 INJECTION INTRAMUSCULAR; INTRAVENOUS at 23:05

## 2021-04-29 RX ADMIN — SODIUM CHLORIDE 1000 ML: 0.9 INJECTION, SOLUTION INTRAVENOUS at 23:06

## 2021-04-29 NOTE — TELEPHONE ENCOUNTER
Patient called and told of preauthorization approval   Informed him that the prescription was sent to [facility] in [place]  Thank you

## 2021-04-29 NOTE — TELEPHONE ENCOUNTER
Called pharmacy to clarify issue  Per pharmacy, patient is currently on her [employer] which does not cover the medication (he reportedly had dropped the [facility] in favor of [facility])  Our charts still have [employer] listed as his health insurance  I am reaching out to Adeline Parker to see if she can help update this in our system  Additionally, spoke with Dr Estelle Saenz who said it would be worthwhile to see if monthly testosterone injections at the office are a viable option for patient with the [employer] given that, per pharmacy, they do not cover the testosterone gel  Called patient and made him aware of plan  Will continue to update him accordingly

## 2021-04-29 NOTE — TELEPHONE ENCOUNTER
Patient states he went to  medication that was sent in today, and was informed that this was not covered per Pharmacy

## 2021-04-29 NOTE — TELEPHONE ENCOUNTER
Hi Dr Brianda Avina,  It appears that insurance authorized the 1 62% for [address] [place]  I unfortunately do not have the ability to prescribe this medication    Anum Gilbert

## 2021-04-30 VITALS
WEIGHT: 212.6 LBS | HEART RATE: 60 BPM | RESPIRATION RATE: 18 BRPM | BODY MASS INDEX: 28.83 KG/M2 | SYSTOLIC BLOOD PRESSURE: 122 MMHG | DIASTOLIC BLOOD PRESSURE: 66 MMHG | TEMPERATURE: 96.9 F | OXYGEN SATURATION: 97 %

## 2021-04-30 PROCEDURE — 96361 HYDRATE IV INFUSION ADD-ON: CPT

## 2021-04-30 RX ORDER — METOCLOPRAMIDE 10 MG/1
10 TABLET ORAL EVERY 6 HOURS
Qty: 30 TABLET | Refills: 0 | Status: SHIPPED | OUTPATIENT
Start: 2021-04-30 | End: 2021-05-03 | Stop reason: SDUPTHER

## 2021-04-30 RX ORDER — DICYCLOMINE HCL 20 MG
20 TABLET ORAL 2 TIMES DAILY
Qty: 20 TABLET | Refills: 0 | Status: SHIPPED | OUTPATIENT
Start: 2021-04-30 | End: 2021-05-03 | Stop reason: SDUPTHER

## 2021-04-30 RX ADMIN — IOHEXOL 100 ML: 350 INJECTION, SOLUTION INTRAVENOUS at 00:01

## 2021-04-30 NOTE — ED NOTES
Pt appeared to be in no acute distress upon discharge  Verbal understanding obtained from pt on d/c instructions as well as Rx and follow up care  Pt able to ambulate well without assistance upon exiting       Erika Durant RN  04/30/21 0153

## 2021-04-30 NOTE — TELEPHONE ENCOUNTER
Will await approval from [facility] [address]  Patient may need to consider paying out-of-pocket for monthly testosterone injections in office if authorization is not obtained

## 2021-04-30 NOTE — DISCHARGE INSTRUCTIONS
Please followup with a GI specialist if symptoms persist  Any worsening of symptoms   Return to ED or follow with pcp

## 2021-04-30 NOTE — TELEPHONE ENCOUNTER
Dr Karolina Metz : Prior Donnie Oven [facility] needs good Dx /reasons With 2 sets labs for testosterone level    Thanks

## 2021-04-30 NOTE — ED PROVIDER NOTES
History  Chief Complaint   Patient presents with    Abdominal Pain     States pain upper abdomen since 4/26 and vomiting daily  Patient goes to Methadone clinic and has not missed any doses  Taking Prilosec and Zofran today   Vomiting     HPI    This is a 31 yo M who presents today with abdominal pain  Patient states for past severeal days he has been vomiting and having epigatric pain  He is going to Methadone clinic and hasnt missed any doses  Patient been taking motrin, zofran and prilosec  No fevers  No diarrhea  No sick contacts  No chest pain  Impression: 31 yo M with abdominal pain  Prior to Admission Medications   Prescriptions Last Dose Informant Patient Reported? Taking? METHADONE HCL PO  Self Yes No   Sig: Take 138 mg by mouth daily [facility]   Testosterone 1 62 % GEL   No No   Sig: Apply 2 pump actuations (2 5 g) once a day to intact, dry skin of shoulders or upper arms     albuterol (PROVENTIL HFA,VENTOLIN HFA) 90 mcg/act inhaler   No No   Sig: Inhale 2 puffs every 6 (six) hours as needed for wheezing   ibuprofen (MOTRIN) 600 mg tablet   No No   Sig: take 1 tablet by mouth every 6 hours   nicotine (RA Nicotine) 21 mg/24 hr TD 24 hr patch   No No   Sig: Place 1 patch on the skin every 24 hours   omeprazole (PriLOSEC) 40 MG capsule   Yes No   Sig: Take 1 tablet by mouth daily   ondansetron (ZOFRAN) 4 mg tablet   No No   Sig: Take 1 tablet (4 mg total) by mouth every 12 (twelve) hours as needed for nausea or vomiting   sildenafil (VIAGRA) 50 MG tablet   No No   Sig: Take 1 tablet (50 mg total) by mouth daily as needed for erectile dysfunction   sucralfate (Carafate) 1 g tablet   Yes No   Sig: take 1 tablet by oral route 4 times every day on an empty stomach 1 hour before meals and at bedtime as needed for abdominal pain/heartburn      Facility-Administered Medications: None       Past Medical History:   Diagnosis Date    Abscess of right hand 12/11/2020    ADHD (attention deficit hyperactivity disorder)     Hypertension     Inguinal hernia     Laceration of right hand without foreign body 12/18/2020    Methadone maintenance therapy patient ([facility] [address] )     Opiate addiction ([facility] )     Psychiatric disorder     anxiety, depression, social personality diorder    Renal calculus, bilateral 3/7/2017       History reviewed  No pertinent surgical history  Family History   Problem Relation Age of Onset    Alcohol abuse Mother     Substance Abuse Mother     Hypertension Mother     Substance Abuse Father     Alcohol abuse Father     Heart disease Father     Arthritis Father     Mental illness Brother     Dementia Maternal Grandfather     Heart disease Maternal Grandfather     Diabetes Maternal Grandfather     Arthritis Maternal Grandfather      I have reviewed and agree with the history as documented  E-Cigarette/Vaping    E-Cigarette Use Never User      E-Cigarette/Vaping Substances    Nicotine No     THC No     CBD No     Flavoring No     Other No     Unknown No      Social History     Tobacco Use    Smoking status: Current Every Day Smoker     Packs/day: 2 00     Types: Cigarettes    Smokeless tobacco: Never Used   Substance Use Topics    Alcohol use: Not Currently    Drug use: Yes     Types: Marijuana     Comment: states on methadone and smoke weed       Review of Systems   Constitutional: Negative  Negative for diaphoresis and fever  HENT: Negative  Respiratory: Negative  Negative for cough, shortness of breath and wheezing  Cardiovascular: Negative  Negative for chest pain, palpitations and leg swelling  Gastrointestinal: Positive for abdominal pain, nausea and vomiting  Negative for abdominal distention  Genitourinary: Negative  Musculoskeletal: Negative  Skin: Negative  Neurological: Negative  Psychiatric/Behavioral: Negative  All other systems reviewed and are negative  Physical Exam  Physical Exam  Vitals signs reviewed  Constitutional:       General: He is not in acute distress  Appearance: He is well-developed  He is not diaphoretic  HENT:      Head: Normocephalic and atraumatic  Nose: Nose normal    Eyes:      Conjunctiva/sclera: Conjunctivae normal       Pupils: Pupils are equal, round, and reactive to light  Neck:      Musculoskeletal: Normal range of motion and neck supple  Cardiovascular:      Rate and Rhythm: Normal rate and regular rhythm  Heart sounds: Normal heart sounds  No murmur  Pulmonary:      Effort: Pulmonary effort is normal  No respiratory distress  Breath sounds: Normal breath sounds  No wheezing or rales  Abdominal:      General: Bowel sounds are normal  There is no distension  Palpations: Abdomen is soft  Tenderness: There is abdominal tenderness in the epigastric area  There is no guarding or rebound  Musculoskeletal: Normal range of motion  General: No tenderness or deformity  Skin:     General: Skin is warm and dry  Coloration: Skin is not pale  Findings: No rash  Neurological:      Mental Status: He is alert and oriented to person, place, and time  Cranial Nerves: No cranial nerve deficit           Vital Signs  ED Triage Vitals [04/29/21 2142]   Temperature Pulse Respirations Blood Pressure SpO2   (!) 97 4 °F (36 3 °C) 78 18 140/81 97 %      Temp Source Heart Rate Source Patient Position - Orthostatic VS BP Location FiO2 (%)   Tympanic Monitor Sitting Left arm --      Pain Score       7           Vitals:    04/29/21 2142 04/30/21 0040 04/30/21 0045   BP: 140/81 122/66 122/66   Pulse: 78 62 60   Patient Position - Orthostatic VS: Sitting Lying Lying         Visual Acuity      ED Medications  Medications   ondansetron (ZOFRAN) injection 4 mg (4 mg Intravenous Given 4/29/21 2305)   sodium chloride 0 9 % bolus 1,000 mL (0 mL Intravenous Stopped 4/30/21 0101)   iohexol (OMNIPAQUE) 350 MG/ML injection (SINGLE-DOSE) 100 mL (100 mL Intravenous Given 4/30/21 0001)       Diagnostic Studies  Results Reviewed     Procedure Component Value Units Date/Time    Comprehensive metabolic panel [[id number]] Collected: 04/29/21 2305    Lab Status: Final result Specimen: Blood from Arm, Left Updated: 04/29/21 2326     Sodium 140 mmol/L      Potassium 3 6 mmol/L      Chloride 103 mmol/L      CO2 32 mmol/L      ANION GAP 5 mmol/L      BUN 6 mg/dL      Creatinine 0 94 mg/dL      Glucose 96 mg/dL      Calcium 8 6 mg/dL      AST 33 U/L      ALT 73 U/L      Alkaline Phosphatase 53 U/L      Total Protein 7 0 g/dL      Albumin 3 8 g/dL      Total Bilirubin 0 29 mg/dL      eGFR 105 ml/min/1 73sq m     Narrative:      National Kidney Disease Foundation guidelines for Chronic Kidney Disease (CKD):     Stage 1 with normal or high GFR (GFR > 90 mL/min/1 73 square meters)    Stage 2 Mild CKD (GFR = 60-89 mL/min/1 73 square meters)    Stage 3A Moderate CKD (GFR = 45-59 mL/min/1 73 square meters)    Stage 3B Moderate CKD (GFR = 30-44 mL/min/1 73 square meters)    Stage 4 Severe CKD (GFR = 15-29 mL/min/1 73 square meters)    Stage 5 End Stage CKD (GFR <15 mL/min/1 73 square meters)  Note: GFR calculation is accurate only with a steady state creatinine    Lipase [[id number]]  (Abnormal) Collected: 04/29/21 2305    Lab Status: Final result Specimen: Blood from Arm, Left Updated: 04/29/21 2326     Lipase 32 u/L     CBC and differential [[id number]]  (Abnormal) Collected: 04/29/21 2305    Lab Status: Final result Specimen: Blood from Arm, Left Updated: 04/29/21 2311     WBC 6 29 Thousand/uL      RBC 4 52 Million/uL      Hemoglobin 13 9 g/dL      Hematocrit 42 5 %      MCV 94 fL      MCH 30 8 pg      MCHC 32 7 g/dL      RDW 12 8 %      MPV 10 2 fL      Platelets 520 Thousands/uL      nRBC 0 /100 WBCs      Neutrophils Relative 40 %      Immat GRANS % 0 %      Lymphocytes Relative 49 %      Monocytes Relative 6 %      Eosinophils Relative 4 %      Basophils Relative 1 %      Neutrophils Absolute 2 50 Thousands/µL      Immature Grans Absolute 0 01 Thousand/uL      Lymphocytes Absolute 3 14 Thousands/µL      Monocytes Absolute 0 35 Thousand/µL      Eosinophils Absolute 0 25 Thousand/µL      Basophils Absolute 0 04 Thousands/µL                  CT abdomen pelvis with contrast   Final Result by Carla Kidd MD (04/30 6651)      No acute intra-abdominal abnormality  No free air or free fluid  Mild to moderate amount of stool noted throughout the colon  No large or small bowel obstruction  Workstation performed: [id number]                    Procedures  Procedures         ED Course  ED Course as of Apr 30 0700   Fri Apr 30, 2021   Abraham Martinez is feeling better                                SBIRT 22yo+      Most Recent Value   SBIRT (23 yo +)   In order to provide better care to our patients, we are screening all of our patients for alcohol and drug use  Would it be okay to ask you these screening questions? Yes Filed at: 04/29/2021 2245   Initial Alcohol Screen: US AUDIT-C    1  How often do you have a drink containing alcohol?  0 Filed at: 04/29/2021 2245   2  How many drinks containing alcohol do you have on a typical day you are drinking? 0 Filed at: 04/29/2021 2245   3a  Male UNDER 65: How often do you have five or more drinks on one occasion? 0 Filed at: 04/29/2021 2245   3b  FEMALE Any Age, or MALE 65+: How often do you have 4 or more drinks on one occassion? 0 Filed at: 04/29/2021 2245   Audit-C Score  0 Filed at: 04/29/2021 2245   JOSE F: How many times in the past year have you    Used an illegal drug or used a prescription medication for non-medical reasons?   Never Filed at: 04/29/2021 2245                    MDM    Disposition  Final diagnoses:   Abdominal pain   Vomiting     Time reflects when diagnosis was documented in both MDM as applicable and the Disposition within this note     Time User Action Codes Description Comment    4/30/2021 12:40 AM Cherelle Jarvis U  8  [R10 9] Abdominal pain     4/30/2021 12:40 AM Andrew Hernandez Add [R11 10] Vomiting       ED Disposition     ED Disposition Condition Date/Time Comment    Discharge Stable Fri Apr 30, 2021 12:40 AM Hank Tello discharge to home/self care              Follow-up Information     Follow up With Specialties Details Why Contact Info Additional [facility] Schedule an appointment as soon as possible for a visit   [address]  [facility] [address] [facility]  [phone] [address] [facility] [place], [address], [address] [facility]          Discharge Medication List as of 4/30/2021 12:42 AM      START taking these medications    Details   dicyclomine (BENTYL) 20 mg tablet Take 1 tablet (20 mg total) by mouth 2 (two) times a day, Starting Fri 4/30/2021, Normal      metoclopramide (REGLAN) 10 mg tablet Take 1 tablet (10 mg total) by mouth every 6 (six) hours, Starting Fri 4/30/2021, Normal         CONTINUE these medications which have NOT CHANGED    Details   albuterol (PROVENTIL HFA,VENTOLIN HFA) 90 mcg/act inhaler Inhale 2 puffs every 6 (six) hours as needed for wheezing, Starting Tue 12/15/2020, Normal      ibuprofen (MOTRIN) 600 mg tablet take 1 tablet by mouth every 6 hours, Normal      METHADONE HCL PO Take 138 mg by mouth daily [facility], Historical Med      nicotine (RA Nicotine) 21 mg/24 hr TD 24 hr patch Place 1 patch on the skin every 24 hours, Starting Wed 4/21/2021, Normal      omeprazole (PriLOSEC) 40 MG capsule Take 1 tablet by mouth daily, Starting Thu 7/9/2020, Historical Med      ondansetron (ZOFRAN) 4 mg tablet Take 1 tablet (4 mg total) by mouth every 12 (twelve) hours as needed for nausea or vomiting, Starting Wed 4/21/2021, Normal      sildenafil (VIAGRA) 50 MG tablet Take 1 tablet (50 mg total) by mouth daily as needed for erectile dysfunction, Starting Tue 12/15/2020, Normal      sucralfate (Carafate) 1 g tablet take 1 tablet by oral route 4 times every day on an empty stomach 1 hour before meals and at bedtime as needed for abdominal pain/heartburn, Historical Med      Testosterone 1 62 % GEL Apply 2 pump actuations (2 5 g) once a day to intact, dry skin of shoulders or upper arms , Normal           No discharge procedures on file      PDMP Review       Value Time User    PDMP Reviewed  Yes 4/7/2021  6:30 PM Eduardo Chen MD          ED Provider  Electronically Signed by           Daniele Last MD  04/30/21 0717

## 2021-04-30 NOTE — ED NOTES
PATIENT REPORTS FEELING BETTER, DENIES NAUSEA OR PAIN AT THIS TIME  DR Bentley Vasquez MADE AWARE        Ashley Oreilly, RN  04/30/21 9335

## 2021-05-03 ENCOUNTER — OFFICE VISIT (OUTPATIENT)
Dept: FAMILY MEDICINE CLINIC | Facility: CLINIC | Age: 36
End: 2021-05-03
Payer: COMMERCIAL

## 2021-05-03 VITALS
HEART RATE: 77 BPM | BODY MASS INDEX: 28.58 KG/M2 | OXYGEN SATURATION: 97 % | HEIGHT: 72 IN | TEMPERATURE: 97.7 F | RESPIRATION RATE: 20 BRPM | WEIGHT: 211 LBS | DIASTOLIC BLOOD PRESSURE: 82 MMHG | SYSTOLIC BLOOD PRESSURE: 136 MMHG

## 2021-05-03 DIAGNOSIS — E29.1 HYPOGONADISM IN MALE: Primary | ICD-10-CM

## 2021-05-03 DIAGNOSIS — R10.9 ABDOMINAL PAIN: ICD-10-CM

## 2021-05-03 DIAGNOSIS — K59.03 DRUG-INDUCED CONSTIPATION: ICD-10-CM

## 2021-05-03 DIAGNOSIS — R11.10 VOMITING: ICD-10-CM

## 2021-05-03 PROCEDURE — 99213 OFFICE O/P EST LOW 20 MIN: CPT | Performed by: FAMILY MEDICINE

## 2021-05-03 PROCEDURE — 3008F BODY MASS INDEX DOCD: CPT | Performed by: FAMILY MEDICINE

## 2021-05-03 RX ORDER — METOCLOPRAMIDE 10 MG/1
10 TABLET ORAL EVERY 6 HOURS
Qty: 60 TABLET | Refills: 1 | Status: SHIPPED | OUTPATIENT
Start: 2021-05-03 | End: 2021-10-15 | Stop reason: SDUPTHER

## 2021-05-03 RX ORDER — TESTOSTERONE CYPIONATE 100 MG/ML
100 INJECTION, SOLUTION INTRAMUSCULAR
Status: DISCONTINUED | OUTPATIENT
Start: 2021-05-03 | End: 2021-05-03

## 2021-05-03 RX ORDER — SENNOSIDES 8.6 MG
8.6 TABLET ORAL
Qty: 30 TABLET | Refills: 1 | Status: SHIPPED | OUTPATIENT
Start: 2021-05-03 | End: 2021-10-15

## 2021-05-03 RX ORDER — DICYCLOMINE HCL 20 MG
20 TABLET ORAL 2 TIMES DAILY
Qty: 30 TABLET | Refills: 1 | Status: SHIPPED | OUTPATIENT
Start: 2021-05-03 | End: 2021-10-15 | Stop reason: SDUPTHER

## 2021-05-03 RX ORDER — POLYETHYLENE GLYCOL 3350 17 G/17G
17 POWDER, FOR SOLUTION ORAL DAILY
Qty: 30 EACH | Refills: 1 | Status: SHIPPED | OUTPATIENT
Start: 2021-05-03 | End: 2021-10-15 | Stop reason: SDUPTHER

## 2021-05-03 RX ORDER — TESTOSTERONE CYPIONATE 100 MG/ML
100 INJECTION, SOLUTION INTRAMUSCULAR
Status: SHIPPED | OUTPATIENT
Start: 2021-05-05 | End: 2021-07-28

## 2021-05-03 NOTE — PROGRESS NOTES
Assessment/Plan:     Ann Marie Carter is a pleasant 30year old male who comes in with multiple complaints  He had a recent CT of his abdomen and pelvis done for abdominal pain which showed stool but no obstruction; however since then he complains of left arm pain near the sight of IV insertion  Based on history and physical examination this is likely a bruise from suspected IV insertion  He has full range of motion and the skin is not warm so there is no suspicion of cellulitis or joint involvement at the elbow  Therefore I advised the patient to try supportive measures for now including ice to the affected area keeping the arm raised as well as tylenol or motrin for pain as needed  In addition the patient has been trying to get authorization for testosterone gel  He paid out of pocket for the last month  Insurance will only pay if he has two sets of levels however it doesn't appear his second sample was ever done and now that he has been on testosterone for one month I don't feel it would accurately reflect his previous levels  Therefore he is willing to pay out of pocket (around 60-70 bucks) for a three months supply and will come to our clinic every two weeks for injection  Lastly his abdominal pain and nausea are currently well controlled with bentyl and reglan  He has an appointment with GI set up which I ordered for him almost a year ago based off of radiology recommendations following a previous CT scan  I do feel based off his last ct abdomen this may be related to constipation which would make sense since he has been on methadone for some time now  Therefore I will order him a bowel regimen, advised him on adequate hydration as well as increase fiber, and he may follow up with GI in a few weeks          Diagnoses and all orders for this visit:    Hypogonadism in male  -     testosterone cypionate (DEPO-TESTOSTERONE) 100 mg/mL IM injection 100 mg    Abdominal pain  -     dicyclomine (BENTYL) 20 mg tablet; Take 1 tablet (20 mg total) by mouth 2 (two) times a day    Vomiting  -     metoclopramide (REGLAN) 10 mg tablet; Take 1 tablet (10 mg total) by mouth every 6 (six) hours    Drug-induced constipation  -     polyethylene glycol (MIRALAX) 17 g packet; Take 17 g by mouth daily  -     senna (SENOKOT) 8 6 mg; Take 1 tablet (8 6 mg total) by mouth daily at bedtime          Subjective:     Patient ID: Ti Clemens is a 30 y o  male  HPI     Gosia Hernandez is a pleasant 30year old male with history of constipation, hypogonadism, as well as current methadone treatment who complains of left arm pain since IV contrast administration for CT scan of his abdomen on 4/29/2021  He denies any fever or chills  No rash, He has full range of motion  He hasn't tried anything to minimize the pain  Pressure to the affected are makes it worse  In addition to his arm pain he has abdominal pain but acknowledges this is chronic in nature  Reglan and bentyl ordered for him previously has helped  He endorses constipation and acknowledges never being on a bowel regimen  Finally he mentions he still is having difficulty getting insurance authorization for testosterone cream and had to pay out of pocket for this over the last month  He had order for repeat testosterone level before starting therapy but this was never done  Review of Systems  Per HPI    Objective:    Vitals:    05/03/21 1447   BP: 136/82   BP Location: Left arm   Patient Position: Sitting   Cuff Size: Large   Pulse: 77   Resp: 20   Temp: 97 7 °F (36 5 °C)   TempSrc: Tympanic   SpO2: 97%   Weight: 95 7 kg (211 lb)   Height: 6' (1 829 m)          Physical Exam  HENT:      Head: Normocephalic and atraumatic  Nose: Nose normal       Mouth/Throat:      Mouth: Mucous membranes are moist    Eyes:      Conjunctiva/sclera: Conjunctivae normal    Neck:      Musculoskeletal: Neck supple  Cardiovascular:      Rate and Rhythm: Normal rate     Pulmonary:      Effort: Pulmonary effort is normal    Abdominal:      Palpations: Abdomen is soft  Musculoskeletal:      Right elbow: Normal      Left elbow: He exhibits normal range of motion, no swelling, no effusion, no deformity and no laceration  No tenderness found  No radial head, no medial epicondyle, no lateral epicondyle and no olecranon process tenderness noted  Skin:     General: Skin is warm  Comments: Bruise appreciated on left ventral side of arm near ante-cubital area   Neurological:      Mental Status: He is alert     Psychiatric:         Mood and Affect: Mood normal

## 2021-05-07 ENCOUNTER — CLINICAL SUPPORT (OUTPATIENT)
Dept: FAMILY MEDICINE CLINIC | Facility: CLINIC | Age: 36
End: 2021-05-07
Payer: COMMERCIAL

## 2021-05-07 DIAGNOSIS — E34.9 TESTOSTERONE DEFICIENCY: Primary | ICD-10-CM

## 2021-05-07 PROCEDURE — 96372 THER/PROPH/DIAG INJ SC/IM: CPT

## 2021-05-07 RX ADMIN — TESTOSTERONE CYPIONATE 100 MG: 100 INJECTION, SOLUTION INTRAMUSCULAR at 15:10

## 2021-05-11 ENCOUNTER — TELEMEDICINE (OUTPATIENT)
Dept: FAMILY MEDICINE CLINIC | Facility: CLINIC | Age: 36
End: 2021-05-11
Payer: COMMERCIAL

## 2021-05-11 DIAGNOSIS — K59.00 CONSTIPATION, UNSPECIFIED CONSTIPATION TYPE: Primary | ICD-10-CM

## 2021-05-11 PROCEDURE — 99212 OFFICE O/P EST SF 10 MIN: CPT | Performed by: FAMILY MEDICINE

## 2021-05-11 RX ORDER — BISACODYL 10 MG
10 SUPPOSITORY, RECTAL RECTAL DAILY
Qty: 12 SUPPOSITORY | Refills: 0 | Status: SHIPPED | OUTPATIENT
Start: 2021-05-11 | End: 2021-07-27 | Stop reason: SDUPTHER

## 2021-05-11 NOTE — PROGRESS NOTES
Virtual Regular Visit      Assessment/Plan:    Problem List Items Addressed This Visit     None      Visit Diagnoses     Constipation, unspecified constipation type    -  Primary    Relevant Medications    bisacodyl (DULCOLAX) 10 mg suppository      · patient notes he has not had a bowel movement in 4 days and he has not passed gas  He notes that his abdominal pain is continuing to get worse  I encouraged him to go to the ER to be evaluated for a possible obstruction  He notes that "they never do anything for me, just send me home "  · I informed patient that smoking marijuana daily could be exacerbating his nausea and vomiting and encouraged him to stop or at least decrease the amount he smokes  · Will prescribe this patient Dulcolax suppository  Reason for visit is   Chief Complaint   Patient presents with    Virtual Regular Visit        Encounter provider Williams Hammond DO    Provider located at [address] [place] [id number]      Recent Visits  Date Type Provider Dept   05/11/21 Telemedicine DO Abhijeet Denny   Showing recent visits within past 7 days and meeting all other requirements     Today's Visits  Date Type Provider Dept   05/12/21 Telemedicine MD Abhijeet Renee   Showing today's visits and meeting all other requirements     Future Appointments  No visits were found meeting these conditions  Showing future appointments within next 150 days and meeting all other requirements        The patient was identified by name and date of birth  Rajat Pompa was informed that this is a telemedicine visit and that the visit is being conducted through [address] [place] [address] and patient was informed that this is a secure, HIPAA-compliant platform  He agrees to proceed     Other methods to assure confidentiality were taken using headphones   The patient was notified the following individuals were present in the room other doctors  He acknowledged consent and understanding of privacy and security of the video platform  The patient has agreed to participate and understands they can discontinue the visit at any time  Patient is aware this is a billable service  Sariah Irby is a 30 y o  male   · Complains of  Vomiting, nausea, and constipation for 2 weeks   · Currently taking Bentyl, reglan, pepcid, senna and zofran   · Smokes marijuana daily  · Unable to keep anything down, including fluids  · Has not had a bowel movement for 4 days and has not passed gas  · Abdominal pain seems to be worsening  · Notes intermittent subjective fevers when he feels warm and then chilled but has not taken his temperature   · Has history of IBS          Past Medical History:   Diagnosis Date    Abscess of right hand 12/11/2020    ADHD (attention deficit hyperactivity disorder)     Hypertension     Inguinal hernia     Laceration of right hand without foreign body 12/18/2020    [facility]adone maintenance therapy patient ([facility] [address] [facility]     [facility] ([facility] )     Psychiatric disorder     anxiety, depression, social personality diorder    Renal calculus, bilateral 3/7/2017       No past surgical history on file      Current Outpatient Medications   Medication Sig Dispense Refill    albuterol (PROVENTIL HFA,VENTOLIN HFA) 90 mcg/act inhaler Inhale 2 puffs every 6 (six) hours as needed for wheezing 1 Inhaler 5    bisacodyl (DULCOLAX) 10 mg suppository Insert 1 suppository (10 mg total) into the rectum daily 12 suppository 0    dicyclomine (BENTYL) 20 mg tablet Take 1 tablet (20 mg total) by mouth 2 (two) times a day 30 tablet 1    ibuprofen (MOTRIN) 600 mg tablet take 1 tablet by mouth every 6 hours 60 tablet 1    METHADONE HCL PO Take 138 mg by mouth daily [facility]      metoclopramide (REGLAN) 10 mg tablet Take 1 tablet (10 mg total) by mouth every 6 (six) hours 60 tablet 1    nicotine (RA Nicotine) 21 mg/24 hr TD 24 hr patch Place 1 patch on the skin every 24 hours (Patient not taking: Reported on 5/3/2021) 28 patch 1    omeprazole (PriLOSEC) 40 MG capsule Take 1 tablet by mouth daily      ondansetron (ZOFRAN) 4 mg tablet Take 1 tablet (4 mg total) by mouth every 12 (twelve) hours as needed for nausea or vomiting 20 tablet 0    polyethylene glycol (MIRALAX) 17 g packet Take 17 g by mouth daily 30 each 1    senna (SENOKOT) 8 6 mg Take 1 tablet (8 6 mg total) by mouth daily at bedtime 30 tablet 1    sildenafil (VIAGRA) 50 MG tablet Take 1 tablet (50 mg total) by mouth daily as needed for erectile dysfunction 10 tablet 2    sucralfate (Carafate) 1 g tablet take 1 tablet by oral route 4 times every day on an empty stomach 1 hour before meals and at bedtime as needed for abdominal pain/heartburn       Current Facility-Administered Medications   Medication Dose Route Frequency Provider Last Rate Last Admin    testosterone cypionate (DEPO-TESTOSTERONE) 100 mg/mL IM injection 100 mg  100 mg Intramuscular Q14 Days Renetta Rubinstein, MD   100 mg at 05/07/21 1510        Allergies   Allergen Reactions    Gluten Meal - Food Allergy Abdominal Pain    Soybean Oil - Food Allergy GI Intolerance       Review of Systems   Constitutional: Positive for chills and fever (subjective)  Respiratory: Negative for shortness of breath  Cardiovascular: Negative for chest pain  Gastrointestinal: Positive for abdominal pain, constipation, nausea and vomiting  Video Exam    There were no vitals filed for this visit  Physical Exam  Constitutional:       General: He is not in acute distress  Appearance: He is not ill-appearing  HENT:      Head: Normocephalic and atraumatic  Pulmonary:      Effort: No respiratory distress  Abdominal:      General: There is distension        Comments: Difficult to exam with phone camera, however could tell that abdomen was mildly distended   Neurological:      General: No focal deficit present  Mental Status: He is alert and oriented to person, place, and time  Psychiatric:         Mood and Affect: Mood normal          Behavior: Behavior normal          Thought Content: Thought content normal          Judgment: Judgment normal           I spent 15 minutes directly with the patient during this visit      [address] [facility] acknowledges that he has consented to an online visit or consultation  He understands that the online visit is based solely on information provided by him, and that, in the absence of a face-to-face physical evaluation by the physician, the diagnosis he receives is both limited and provisional in terms of accuracy and completeness  This is not intended to replace a full medical face-to-face evaluation by the physician  Candice Dewey understands and accepts these terms

## 2021-05-12 ENCOUNTER — TELEMEDICINE (OUTPATIENT)
Dept: FAMILY MEDICINE CLINIC | Facility: CLINIC | Age: 36
End: 2021-05-12
Payer: COMMERCIAL

## 2021-05-12 DIAGNOSIS — R10.84 GENERALIZED ABDOMINAL PAIN: Primary | ICD-10-CM

## 2021-05-12 PROCEDURE — 99213 OFFICE O/P EST LOW 20 MIN: CPT | Performed by: FAMILY MEDICINE

## 2021-05-12 NOTE — PROGRESS NOTES
Virtual Brief Visit    Assessment/Plan:    Problem List Items Addressed This Visit     None      Visit Diagnoses     Generalized abdominal pain    -  Primary        1  Generalized abdominal pain  - Mr Hipolito Michel has abdominal pain mildly improved after BM  Given BM was small and prior to this he had 4 days of no BM, discussed with patient he would benefit more from an enema to continue to pass stool which will alleviate his symptoms more than taking more bentyl  Call was disconnected and unable to reach patient back after this conversation  Case discussed with Dr Clary Williamson  Ample time provided during visit to answer all questions  Recommended to call back office with any questions  Reason for visit is   Chief Complaint   Patient presents with    Virtual Brief Visit        Encounter provider Mónica Montenegro MD    Provider located at [address] [place] [phone]    Recent Visits  Date Type Provider Dept   05/11/21 Telemedicine Minor Deluna DO Pg Darlin Estrada   Showing recent visits within past 7 days and meeting all other requirements     Today's Visits  Date Type Provider Dept   05/12/21 Telemedicine MD Abhijeet Muniz   Showing today's visits and meeting all other requirements     Future Appointments  No visits were found meeting these conditions  Showing future appointments within next 150 days and meeting all other requirements        After connecting through [employer] and patient was informed that this is a secure, HIPAA-compliant platform  He agrees to proceed  , the patient was identified by name and date of birth  Fabiola Quick was informed that this is a telemedicine visit and that the visit is being conducted through [employer] and patient was informed that this is a secure, HIPAA-compliant platform  He agrees to proceed     My office door was closed   The patient was notified the following individuals were present in the room other providers  He acknowledged consent and understanding of privacy and security of the platform  The patient has agreed to participate and understands he can discontinue the visit at any time  It was my intent to perform this visit via video technology but the patient was not able to do a video connection so the visit was completed via audio telephone only  Patient is aware this is a billable service  Tracy Dietrich is a 30 y o  male   HPI     - interested in increasing dose of bentyl  - used dulcolax suppository yesterday, had small BM  - has been taking bentyl 20 mg q6H, alleviates symptoms but not for long enough  - T 99 this morning, took tylenol and went down to 98 7  - reports pain mildly alleviated after BM  - Has hx of smoking marijuana and IBS  - Has f/u appt on May 19th with GI    Past Medical History:   Diagnosis Date    Abscess of right hand 12/11/2020    ADHD (attention deficit hyperactivity disorder)     Hypertension     Inguinal hernia     Laceration of right hand without foreign body 12/18/2020    [facility]adone maintenance therapy patient ([facility] [address] )     [facility] addiction ([facility] )     Psychiatric disorder     anxiety, depression, social personality diorder    Renal calculus, bilateral 3/7/2017       No past surgical history on file      Current Outpatient Medications   Medication Sig Dispense Refill    albuterol (PROVENTIL HFA,VENTOLIN HFA) 90 mcg/act inhaler Inhale 2 puffs every 6 (six) hours as needed for wheezing 1 Inhaler 5    bisacodyl (DULCOLAX) 10 mg suppository Insert 1 suppository (10 mg total) into the rectum daily 12 suppository 0    dicyclomine (BENTYL) 20 mg tablet Take 1 tablet (20 mg total) by mouth 2 (two) times a day 30 tablet 1    ibuprofen (MOTRIN) 600 mg tablet take 1 tablet by mouth every 6 hours 60 tablet 1    METHADONE HCL PO Take 138 mg by mouth daily [facility]      metoclopramide (REGLAN) 10 mg tablet Take 1 tablet (10 mg total) by mouth every 6 (six) hours 60 tablet 1    nicotine (RA Nicotine) 21 mg/24 hr TD 24 hr patch Place 1 patch on the skin every 24 hours (Patient not taking: Reported on 5/3/2021) 28 patch 1    omeprazole (PriLOSEC) 40 MG capsule Take 1 tablet by mouth daily      ondansetron (ZOFRAN) 4 mg tablet Take 1 tablet (4 mg total) by mouth every 12 (twelve) hours as needed for nausea or vomiting 20 tablet 0    polyethylene glycol (MIRALAX) 17 g packet Take 17 g by mouth daily 30 each 1    senna (SENOKOT) 8 6 mg Take 1 tablet (8 6 mg total) by mouth daily at bedtime 30 tablet 1    sildenafil (VIAGRA) 50 MG tablet Take 1 tablet (50 mg total) by mouth daily as needed for erectile dysfunction 10 tablet 2    sucralfate (Carafate) 1 g tablet take 1 tablet by oral route 4 times every day on an empty stomach 1 hour before meals and at bedtime as needed for abdominal pain/heartburn       Current Facility-Administered Medications   Medication Dose Route Frequency Provider Last Rate Last Admin    testosterone cypionate (DEPO-TESTOSTERONE) 100 mg/mL IM injection 100 mg  100 mg Intramuscular Q14 Days Cathryn Tolentino MD   100 mg at 05/07/21 1510        Allergies   Allergen Reactions    Gluten Meal - Food Allergy Abdominal Pain    Soybean Oil - Food Allergy GI Intolerance       Review of Systems   Constitutional: Negative for chills, fatigue and fever  Gastrointestinal: Positive for abdominal pain, constipation, diarrhea and nausea  There were no vitals filed for this visit  I spent 10 minutes directly with the patient during this visit    [address] [facility] acknowledges that he has consented to an online visit or consultation   He understands that the online visit is based solely on information provided by him, and that, in the absence of a face-to-face physical evaluation by the physician, the diagnosis he receives is both limited and provisional in terms of accuracy and completeness  This is not intended to replace a full medical face-to-face evaluation by the physician  Jared Aparicio understands and accepts these terms

## 2021-05-18 DIAGNOSIS — S61.401A OPEN WOUND OF RIGHT HAND WITHOUT FOREIGN BODY, UNSPECIFIED WOUND TYPE, INITIAL ENCOUNTER: ICD-10-CM

## 2021-05-19 ENCOUNTER — TELEPHONE (OUTPATIENT)
Dept: GASTROENTEROLOGY | Facility: CLINIC | Age: 36
End: 2021-05-19

## 2021-05-19 ENCOUNTER — OFFICE VISIT (OUTPATIENT)
Dept: GASTROENTEROLOGY | Facility: CLINIC | Age: 36
End: 2021-05-19
Payer: COMMERCIAL

## 2021-05-19 VITALS
DIASTOLIC BLOOD PRESSURE: 93 MMHG | SYSTOLIC BLOOD PRESSURE: 114 MMHG | TEMPERATURE: 97.5 F | HEIGHT: 72 IN | WEIGHT: 207 LBS | BODY MASS INDEX: 28.04 KG/M2 | HEART RATE: 77 BPM

## 2021-05-19 DIAGNOSIS — R10.11 RUQ PAIN: ICD-10-CM

## 2021-05-19 DIAGNOSIS — K59.00 CONSTIPATION, UNSPECIFIED CONSTIPATION TYPE: Primary | ICD-10-CM

## 2021-05-19 DIAGNOSIS — K21.9 GASTROESOPHAGEAL REFLUX DISEASE, UNSPECIFIED WHETHER ESOPHAGITIS PRESENT: ICD-10-CM

## 2021-05-19 PROCEDURE — 3008F BODY MASS INDEX DOCD: CPT | Performed by: PHYSICIAN ASSISTANT

## 2021-05-19 PROCEDURE — 99204 OFFICE O/P NEW MOD 45 MIN: CPT | Performed by: PHYSICIAN ASSISTANT

## 2021-05-19 PROCEDURE — 4004F PT TOBACCO SCREEN RCVD TLK: CPT | Performed by: PHYSICIAN ASSISTANT

## 2021-05-19 RX ORDER — TESTOSTERONE CYPIONATE 200 MG/ML
INJECTION INTRAMUSCULAR
COMMUNITY
Start: 2021-05-04 | End: 2021-10-15

## 2021-05-19 RX ORDER — IBUPROFEN 600 MG/1
TABLET ORAL
Qty: 60 TABLET | Refills: 1 | Status: SHIPPED | OUTPATIENT
Start: 2021-05-19 | End: 2022-03-18 | Stop reason: ALTCHOICE

## 2021-05-19 RX ORDER — PANTOPRAZOLE SODIUM 40 MG/1
40 TABLET, DELAYED RELEASE ORAL 2 TIMES DAILY
Qty: 180 TABLET | Refills: 0 | Status: SHIPPED | OUTPATIENT
Start: 2021-05-19 | End: 2021-07-27 | Stop reason: SDUPTHER

## 2021-05-19 NOTE — TELEPHONE ENCOUNTER
Patient stopped in asking for status on refill  I let them know there is a 48-72 hour julisa period for refills to be done  Thank you

## 2021-05-19 NOTE — PROGRESS NOTES
[facility] - Outpatient Consultation  Abdoulaye Becerra 30 y o  male MRN: [id number]  Encounter: [id number]          ASSESSMENT AND PLAN:        1  Chronic constipation, most likely functional and at least in part related to chronic methadone use, he is also using Bentyl regularly at this time although this is a more recent addition  He has never had colonoscopy though, so should rule out underlying adenomatous polyps or malignancy  Recent TSH was normal     - will plan for colonoscopy, in addition to EGD     - patient was advised regarding risks and benefits of the procedure, risks including but not limited to infection, perforation and bleeding     - prescription and instructions provided for colonoscopy prep       2  Intermittent right upper quadrant pain, patient reports his sometimes more generalized, he had CT scan during recent hospital presentation showing no acute pathology but should rule out underlying peptic ulcer disease, gastritis, symptomatic cholelithiasis     He does report more longstanding issues of frequent nausea and vomiting, I suspect a component of cannabis hyperemesis in his case but again should exclude other organic causes    - check right upper quadrant ultrasound     -  Will change to different PPI, try Protonix twice daily instead of omeprazole once daily; to treat empirically for any possible peptic ulcer disease at this time     - will plan for EGD, in addition to colonoscopy     -  Patient was advised regarding risks and benefits of the procedure     - advised patient about marijuana cessation     - will obtain records from patient's reported recent EGD from [facility]; if sufficient evaluation was obtained (i e  if procedure had adequate visualization and duodenal biopsies/gastric biopsies were done at that time), then we can potentially cancel EGD and pursue colonoscopy alone  ______________________________________________________________________    HPI:    36-year-old male with history of ADHD, anxiety/ depression, opiate addiction and goes to methadone clinic who presents for initial evaluation  He was seen in the emergency room on 04/29 with complaint of several days of vomiting and abdominal pain, reported he was taking Motrin Zofran and Prilosec at that time  CT scan at that time showed no evidence of any acute process, did show a mild to moderate amount of stool in the colon  He was given prescriptions for Bentyl and Reglan  Later on 05/03 he saw Dr Royal Salazar of family medicine in the office, it was reported his abdominal pain and nausea were well controlled with Bentyl and Reglan, and he was recommended to take a bowel regimen to treat underlying constipation (miralax, senna, and fiber daily), and follow-up with us  At this time,  Patient says he still has issues with vomiting 3-4 times daily, he says he has been doing this about every day for the last 4-6 months  He uses marijuana every day  He says he is taking Bentyl twice daily, again he is also on methadone  Says he is using senna daily for his bowel movements, mentions having tried MiraLax before as well  Patient actually tells me he had EGD in the last couple of months with [facility],  He does not remember the name of the doctor, he he says he did not want to go back there, he says he was told they were not sure if he had celiac disease but he did not follow up for further testing for that  He says he is taking Prilosec once daily in the morning, he does not think it is helping very much for his acid reflux  He says he has bowel movements about once a week at this point  Reports his weight is stable  He has never had a colonoscopy  REVIEW OF SYSTEMS:    CONSTITUTIONAL: Denies any fever, chills, rigors, and weight loss  HEENT: No earache or tinnitus   Denies hearing loss or visual disturbances  CARDIOVASCULAR: No chest pain or palpitations  RESPIRATORY: Denies any cough, hemoptysis, shortness of breath or dyspnea on exertion  GASTROINTESTINAL: As noted in the History of Present Illness  GENITOURINARY: No problems with urination  Denies any hematuria or dysuria  NEUROLOGIC: No dizziness or vertigo, denies headaches  MUSCULOSKELETAL: Denies any muscle or joint pain  SKIN: Denies skin rashes or itching  ENDOCRINE: Denies excessive thirst  Denies intolerance to heat or cold  PSYCHOSOCIAL: Denies depression or anxiety  Denies any recent memory loss  Historical Information   Past Medical History:   Diagnosis Date    Abscess of right hand 12/11/2020    ADHD (attention deficit hyperactivity disorder)     Hypertension     Inguinal hernia     Laceration of right hand without foreign body 12/18/2020    [facility]ad[facility] maintenance [facility] [address] [facility]     [facility]ate addiction ([facility] )     Psychiatric disorder     anxiety, depression, social personality diorder    Renal calculus, bilateral 3/7/2017     No past surgical history on file    Social History   Social History     Substance and Sexual Activity   Alcohol Use Not Currently     Social History     Substance and Sexual Activity   Drug Use Yes    Types: Marijuana    Comment: states on methadone and smoke weed     Social History     Tobacco Use   Smoking Status Current Every Day Smoker    Packs/day: 2 00    Types: Cigarettes   Smokeless Tobacco Never Used     Family History   Problem Relation Age of Onset    Alcohol abuse Mother     Substance Abuse Mother     Hypertension Mother     Substance Abuse Father     Alcohol abuse Father     Heart disease Father     Arthritis Father     Mental illness Brother     Dementia Maternal Grandfather     Heart disease Maternal Grandfather     Diabetes Maternal Grandfather     Arthritis Maternal Grandfather        Meds/Allergies       Current Outpatient Medications:     albuterol (PROVENTIL HFA,VENTOLIN HFA) 90 mcg/act inhaler    bisacodyl (DULCOLAX) 10 mg suppository    dicyclomine (BENTYL) 20 mg tablet    ibuprofen (MOTRIN) 600 mg tablet    METHADONE HCL PO    metoclopramide (REGLAN) 10 mg tablet    nicotine (RA Nicotine) 21 mg/24 hr TD 24 hr patch    omeprazole (PriLOSEC) 40 MG capsule    ondansetron (ZOFRAN) 4 mg tablet    polyethylene glycol (MIRALAX) 17 g packet    senna (SENOKOT) 8 6 mg    sildenafil (VIAGRA) 50 MG tablet    sucralfate (Carafate) 1 g tablet    Current Facility-Administered Medications:     testosterone cypionate (DEPO-TESTOSTERONE) 100 mg/mL IM injection 100 mg, 100 mg, Intramuscular, Q14 Days, 100 mg at 05/07/21 1510    Allergies   Allergen Reactions    Gluten Meal - Food Allergy Abdominal Pain    Soybean Oil - Food Allergy GI Intolerance           Objective     There were no vitals taken for this visit  There is no height or weight on file to calculate BMI  PHYSICAL EXAM:      General Appearance:   Alert, cooperative, no distress   HEENT:   Normocephalic, atraumatic, anicteric      Neck:  Supple, symmetrical, trachea midline   Lungs:   Clear to auscultation bilaterally; no rales, rhonchi or wheezing; respirations unlabored    Heart[de-identified]   Regular rate and rhythm; no murmur, rub, or gallop  Abdomen:   Soft, non-tender, non-distended; normal bowel sounds; no masses, no organomegaly    Genitalia:   Deferred    Rectal:   Deferred    Extremities:  No cyanosis, clubbing or edema    Pulses:  2+ and symmetric    Skin:  No jaundice, rashes, or lesions    Lymph nodes:  No palpable cervical lymphadenopathy        Lab Results:   No visits with results within 1 Day(s) from this visit     Latest known visit with results is:   Admission on 04/29/2021, Discharged on 04/30/2021   Component Date Value    WBC 04/29/2021 6 29     RBC 04/29/2021 4 52     Hemoglobin 04/29/2021 13 9     Hematocrit 04/29/2021 42 5     MCV 04/29/2021 94     MCH 04/29/2021 30 8     MCHC 04/29/2021 32 7     RDW 04/29/2021 12 8     MPV 04/29/2021 10 2     Platelets 16/03/2551 183     nRBC 04/29/2021 0     Neutrophils Relative 04/29/2021 40*    Immat GRANS % 04/29/2021 0     Lymphocytes Relative 04/29/2021 49*    Monocytes Relative 04/29/2021 6     Eosinophils Relative 04/29/2021 4     Basophils Relative 04/29/2021 1     Neutrophils Absolute 04/29/2021 2 50     Immature Grans Absolute 04/29/2021 0 01     Lymphocytes Absolute 04/29/2021 3 14     Monocytes Absolute 04/29/2021 0 35     Eosinophils Absolute 04/29/2021 0 25     Basophils Absolute 04/29/2021 0 04     Sodium 04/29/2021 140     Potassium 04/29/2021 3 6     Chloride 04/29/2021 103     CO2 04/29/2021 32     ANION GAP 04/29/2021 5     BUN 04/29/2021 6     Creatinine 04/29/2021 0 94     Glucose 04/29/2021 96     Calcium 04/29/2021 8 6     AST 04/29/2021 33     ALT 04/29/2021 73     Alkaline Phosphatase 04/29/2021 53     Total Protein 04/29/2021 7 0     Albumin 04/29/2021 3 8     Total Bilirubin 04/29/2021 0 29     eGFR 04/29/2021 105     Lipase 04/29/2021 32*         Radiology Results:   Ct Abdomen Pelvis With Contrast    Result Date: 4/30/2021  Narrative: CT ABDOMEN AND PELVIS WITH IV CONTRAST INDICATION:   Abdominal pain, acute, nonlocalized abdominal pain, nausea  COMPARISON:  CT abdomen/pelvis dated March 1, 2017  TECHNIQUE:  CT examination of the abdomen and pelvis was performed  Axial, sagittal, and coronal 2D reformatted images were created from the source data and submitted for interpretation  Radiation dose length product (DLP) for this visit:  667 mGy-cm   This examination, like all CT scans performed in the [facility], was performed utilizing techniques to minimize radiation dose exposure, including the use of iterative reconstruction and automated exposure control  IV Contrast:  100 mL of iohexol (OMNIPAQUE) Enteric Contrast:  Enteric contrast was not administered   FINDINGS: ABDOMEN LOWER CHEST:  Atelectatic changes are noted at the lung bases  LIVER/BILIARY TREE:  Unremarkable  GALLBLADDER:  No calcified gallstones  No pericholecystic inflammatory change  SPLEEN:  The spleen is mildly enlarged measuring 14 2 cm in length  PANCREAS:  Unremarkable  ADRENAL GLANDS:  Unremarkable  KIDNEYS/URETERS:  Unremarkable  No hydronephrosis  STOMACH AND BOWEL:  A mild to moderate amount of stool is noted throughout colon  There is no evidence of large or small bowel obstruction  APPENDIX:  A normal appendix was visualized  ABDOMINOPELVIC CAVITY:  No ascites  No pneumoperitoneum  No lymphadenopathy  VESSELS:  Unremarkable for patient's age  PELVIS REPRODUCTIVE ORGANS:  Unremarkable for patient's age  URINARY BLADDER:  Unremarkable  ABDOMINAL WALL/INGUINAL REGIONS:  Unremarkable  OSSEOUS STRUCTURES:  No acute fracture or destructive osseous lesion  Impression: No acute intra-abdominal abnormality  No free air or free fluid  Mild to moderate amount of stool noted throughout the colon  No large or small bowel obstruction   Workstation performed: [id number]

## 2021-05-19 NOTE — TELEPHONE ENCOUNTER
Patient is scheduled for COLON/EGD on June 17 , 2021 at [address] [place] [address] with Ceferino Carrillo MD  Patient is aware of pre-procedure prep of Suprep and they will be called the day prior between 2 and 6 pm for time to report for procedure

## 2021-05-27 ENCOUNTER — TRANSCRIBE ORDERS (OUTPATIENT)
Dept: ADMINISTRATIVE | Facility: HOSPITAL | Age: 36
End: 2021-05-27

## 2021-05-27 ENCOUNTER — OFFICE VISIT (OUTPATIENT)
Dept: LAB | Facility: HOSPITAL | Age: 36
End: 2021-05-27
Payer: COMMERCIAL

## 2021-05-27 DIAGNOSIS — I45.81 LONG QT SYNDROME: ICD-10-CM

## 2021-05-27 DIAGNOSIS — R94.31 NONSPECIFIC ABNORMAL ELECTROCARDIOGRAM (ECG) (EKG): Primary | ICD-10-CM

## 2021-05-27 DIAGNOSIS — R94.31 NONSPECIFIC ABNORMAL ELECTROCARDIOGRAM (ECG) (EKG): ICD-10-CM

## 2021-05-27 PROCEDURE — 93005 ELECTROCARDIOGRAM TRACING: CPT

## 2021-05-28 DIAGNOSIS — R11.2 NAUSEA AND VOMITING IN ADULT: ICD-10-CM

## 2021-05-28 LAB
ATRIAL RATE: 64 BPM
P AXIS: 52 DEGREES
PR INTERVAL: 144 MS
QRS AXIS: 72 DEGREES
QRSD INTERVAL: 90 MS
QT INTERVAL: 424 MS
QTC INTERVAL: 437 MS
T WAVE AXIS: 60 DEGREES
VENTRICULAR RATE: 64 BPM

## 2021-05-28 PROCEDURE — 93010 ELECTROCARDIOGRAM REPORT: CPT | Performed by: INTERNAL MEDICINE

## 2021-05-28 RX ORDER — ONDANSETRON 4 MG/1
TABLET, FILM COATED ORAL
Qty: 20 TABLET | Refills: 0 | Status: SHIPPED | OUTPATIENT
Start: 2021-05-28 | End: 2021-10-15

## 2021-06-04 LAB
ENDOMYSIUM IGA SER QL: NEGATIVE
GLIADIN PEPTIDE IGA SER-ACNC: 3 UNITS (ref 0–19)
GLIADIN PEPTIDE IGG SER-ACNC: 2 UNITS (ref 0–19)
IGA SERPL-MCNC: 165 MG/DL (ref 90–386)
TTG IGA SER-ACNC: <2 U/ML (ref 0–3)
TTG IGG SER-ACNC: 3 U/ML (ref 0–5)

## 2021-06-14 ENCOUNTER — TELEPHONE (OUTPATIENT)
Dept: PREADMISSION TESTING | Facility: HOSPITAL | Age: 36
End: 2021-06-14

## 2021-06-16 ENCOUNTER — TELEPHONE (OUTPATIENT)
Dept: GASTROENTEROLOGY | Facility: AMBULARY SURGERY CENTER | Age: 36
End: 2021-06-16

## 2021-06-16 ENCOUNTER — TELEPHONE (OUTPATIENT)
Dept: PREADMISSION TESTING | Facility: HOSPITAL | Age: 36
End: 2021-06-16

## 2021-06-16 NOTE — TELEPHONE ENCOUNTER
Several attempts to contact pt re: confirming GI procedure w/ dr Matthew Salazar at [facility] have been made to pt by pre admission and GI scheduling  There is nio documen tation that pt went for COVID test  Procedure has been cxd

## 2021-07-25 ENCOUNTER — HOSPITAL ENCOUNTER (EMERGENCY)
Facility: HOSPITAL | Age: 36
Discharge: HOME | End: 2021-07-25
Attending: EMERGENCY MEDICINE | Admitting: EMERGENCY MEDICINE
Payer: COMMERCIAL

## 2021-07-25 ENCOUNTER — APPOINTMENT (EMERGENCY)
Dept: RADIOLOGY | Facility: HOSPITAL | Age: 36
End: 2021-07-25
Payer: COMMERCIAL

## 2021-07-25 VITALS
TEMPERATURE: 99 F | OXYGEN SATURATION: 98 % | DIASTOLIC BLOOD PRESSURE: 91 MMHG | RESPIRATION RATE: 13 BRPM | HEART RATE: 94 BPM | BODY MASS INDEX: 27.12 KG/M2 | SYSTOLIC BLOOD PRESSURE: 141 MMHG | WEIGHT: 200 LBS

## 2021-07-25 DIAGNOSIS — R07.9 CHEST PAIN: Primary | ICD-10-CM

## 2021-07-25 LAB
ATRIAL RATE: 108 BPM
BASOPHILS # BLD AUTO: 0.06 THOUSANDS/ΜL (ref 0–0.1)
BASOPHILS NFR BLD AUTO: 1 % (ref 0–1)
EOSINOPHIL # BLD AUTO: 0.22 THOUSAND/ΜL (ref 0–0.61)
EOSINOPHIL NFR BLD AUTO: 2 % (ref 0–6)
ERYTHROCYTE [DISTWIDTH] IN BLOOD BY AUTOMATED COUNT: 13.6 % (ref 11.6–15.1)
HCT VFR BLD AUTO: 49.9 % (ref 36.5–49.3)
HGB BLD-MCNC: 16.8 G/DL (ref 12–17)
IMM GRANULOCYTES # BLD AUTO: 0.02 THOUSAND/UL (ref 0–0.2)
IMM GRANULOCYTES NFR BLD AUTO: 0 % (ref 0–2)
LYMPHOCYTES # BLD AUTO: 3.77 THOUSANDS/ΜL (ref 0.6–4.47)
LYMPHOCYTES NFR BLD AUTO: 41 % (ref 14–44)
MCH RBC QN AUTO: 32.1 PG (ref 26.8–34.3)
MCHC RBC AUTO-ENTMCNC: 33.7 G/DL (ref 31.4–37.4)
MCV RBC AUTO: 95 FL (ref 82–98)
MONOCYTES # BLD AUTO: 0.56 THOUSAND/ΜL (ref 0.17–1.22)
MONOCYTES NFR BLD AUTO: 6 % (ref 4–12)
NEUTROPHILS # BLD AUTO: 4.65 THOUSANDS/ΜL (ref 1.85–7.62)
NEUTS SEG NFR BLD AUTO: 50 % (ref 43–75)
NRBC BLD AUTO-RTO: 0 /100 WBCS
P AXIS: 63 DEGREES
PLATELET # BLD AUTO: 260 THOUSANDS/UL (ref 149–390)
PMV BLD AUTO: 10.9 FL (ref 8.9–12.7)
PR INTERVAL: 146 MS
QRS AXIS: 78 DEGREES
QRSD INTERVAL: 84 MS
QT INTERVAL: 334 MS
QTC INTERVAL: 447 MS
RBC # BLD AUTO: 5.23 MILLION/UL (ref 3.88–5.62)
T WAVE AXIS: 46 DEGREES
VENTRICULAR RATE: 108 BPM
WBC # BLD AUTO: 9.28 THOUSAND/UL (ref 4.31–10.16)

## 2021-07-25 PROCEDURE — 93005 ELECTROCARDIOGRAM TRACING: CPT

## 2021-07-25 PROCEDURE — 85025 COMPLETE CBC W/AUTO DIFF WBC: CPT | Performed by: EMERGENCY MEDICINE

## 2021-07-25 PROCEDURE — 99285 EMERGENCY DEPT VISIT HI MDM: CPT | Performed by: EMERGENCY MEDICINE

## 2021-07-25 PROCEDURE — 99285 EMERGENCY DEPT VISIT HI MDM: CPT

## 2021-07-25 PROCEDURE — 36415 COLL VENOUS BLD VENIPUNCTURE: CPT | Performed by: EMERGENCY MEDICINE

## 2021-07-25 PROCEDURE — 93010 ELECTROCARDIOGRAM REPORT: CPT | Performed by: INTERNAL MEDICINE

## 2021-07-25 NOTE — ED PROVIDER NOTES
History  Chief Complaint   Patient presents with    Chest Pain     Arrives BLS, reported that he and his wife was going through someone else's trash and  came to "harrass" him  Pt co of CP, SOB and anxiety  Pain 7/10  Denies n/v       Patient presents for evaluation of chest pain anxiety  Patient states him and his wife or walking to the store insulin drip with some trash out and they started to go through it  States the  showed up and started "harrassing" him  After please showed up the developed some chest pain chest described as chest tightness with shortness of breath anxiety  History provided by:  Patient   used: No    Chest Pain  Associated symptoms: shortness of breath    Associated symptoms: no abdominal pain, no back pain, no cough, no fever, no palpitations and not vomiting        Prior to Admission Medications   Prescriptions Last Dose Informant Patient Reported? Taking?    METHADONE HCL PO 7/25/2021 at Unknown time Self Yes Yes   Sig: Take 138 mg by mouth daily [facility]   albuterol (PROVENTIL HFA,VENTOLIN HFA) 90 mcg/act inhaler  Self No No   Sig: Inhale 2 puffs every 6 (six) hours as needed for wheezing   bisacodyl (DULCOLAX) 10 mg suppository  Self No No   Sig: Insert 1 suppository (10 mg total) into the rectum daily   dicyclomine (BENTYL) 20 mg tablet  Self No No   Sig: Take 1 tablet (20 mg total) by mouth 2 (two) times a day   ibuprofen (MOTRIN) 600 mg tablet   No No   Sig: take 1 tablet by mouth every 6 hours   metoclopramide (REGLAN) 10 mg tablet  Self No No   Sig: Take 1 tablet (10 mg total) by mouth every 6 (six) hours   nicotine (RA Nicotine) 21 mg/24 hr TD 24 hr patch  Self No No   Sig: Place 1 patch on the skin every 24 hours   Patient not taking: Reported on 5/3/2021   ondansetron (ZOFRAN) 4 mg tablet   No No   Sig: take 1 tablet by mouth every 8 hours if needed for nausea or vomiting   pantoprazole (PROTONIX) 40 mg tablet 7/25/2021 at Unknown time No Yes   Sig: Take 1 tablet (40 mg total) by mouth 2 (two) times a day   polyethylene glycol (MIRALAX) 17 g packet  Self No No   Sig: Take 17 g by mouth daily   senna (SENOKOT) 8 6 mg  Self No No   Sig: Take 1 tablet (8 6 mg total) by mouth daily at bedtime   sildenafil (VIAGRA) 50 MG tablet  Self No No   Sig: Take 1 tablet (50 mg total) by mouth daily as needed for erectile dysfunction   testosterone cypionate (DEPO-TESTOSTERONE) 200 mg/mL SOLN  Self Yes No   Sig: INJECT 100 MG EVERY 2 WEEKS AS DIRECTED      Facility-Administered Medications Last Administration Doses Remaining   testosterone cypionate (DEPO-TESTOSTERONE) 100 mg/mL IM injection 100 mg 5/7/2021  3:10 PM 5          Past Medical History:   Diagnosis Date    Abscess of right hand 12/11/2020    ADHD (attention deficit hyperactivity disorder)     Hypertension     Inguinal hernia     Laceration of right hand without foreign body 12/18/2020    [facility] patient ([facility] [address] )     [facility] addiction ([facility] )     Psychiatric disorder     anxiety, depression, social personality diorder    Renal calculus, bilateral 3/7/2017       History reviewed  No pertinent surgical history  Family History   Problem Relation Age of Onset    Alcohol abuse Mother     Substance Abuse Mother     Hypertension Mother     Substance Abuse Father     Alcohol abuse Father     Heart disease Father     Arthritis Father     Mental illness Brother     Dementia Maternal Grandfather     Heart disease Maternal Grandfather     Diabetes Maternal Grandfather     Arthritis Maternal Grandfather      I have reviewed and agree with the history as documented      E-Cigarette/Vaping    E-Cigarette Use Never User      E-Cigarette/Vaping Substances    Nicotine No     THC No     CBD No     Flavoring No     Other No     Unknown No      Social History     Tobacco Use    Smoking status: Current Every Day Smoker     Packs/day: 2 00     Types: Cigarettes    Smokeless tobacco: Never Used   Vaping Use    Vaping Use: Never used   Substance Use Topics    Alcohol use: Yes     Comment: soc    Drug use: Not Currently     Types: Marijuana     Comment: states on methadone and smoke weed       Review of Systems   Constitutional: Negative for chills and fever  HENT: Negative for ear pain and sore throat  Eyes: Negative for pain and visual disturbance  Respiratory: Positive for shortness of breath  Negative for cough  Cardiovascular: Positive for chest pain  Negative for palpitations  Gastrointestinal: Negative for abdominal pain and vomiting  Genitourinary: Negative for dysuria and hematuria  Musculoskeletal: Negative for arthralgias and back pain  Skin: Negative for color change and rash  Neurological: Negative for seizures and syncope  Psychiatric/Behavioral: The patient is nervous/anxious  All other systems reviewed and are negative  Physical Exam  Physical Exam  Vitals and nursing note reviewed  Constitutional:       General: He is not in acute distress  Appearance: Normal appearance  HENT:      Head: Atraumatic  Right Ear: External ear normal       Left Ear: External ear normal       Nose: Nose normal       Mouth/Throat:      Mouth: Mucous membranes are moist       Pharynx: Oropharynx is clear  Eyes:      General: No scleral icterus  Conjunctiva/sclera: Conjunctivae normal    Cardiovascular:      Rate and Rhythm: Regular rhythm  Tachycardia present  Pulses: Normal pulses  Pulmonary:      Effort: Pulmonary effort is normal  No respiratory distress  Breath sounds: Normal breath sounds  No wheezing, rhonchi or rales  Abdominal:      General: Abdomen is flat  Bowel sounds are normal  There is no distension  Palpations: Abdomen is soft  Tenderness: There is no abdominal tenderness  There is no guarding or rebound  Musculoskeletal:         General: No deformity  Normal range of motion        Right lower leg: No edema  Left lower leg: No edema  Skin:     Capillary Refill: Capillary refill takes less than 2 seconds  Findings: No rash  Neurological:      General: No focal deficit present  Mental Status: He is alert and oriented to person, place, and time           Vital Signs  ED Triage Vitals [07/25/21 1451]   Temperature Pulse Respirations Blood Pressure SpO2   99 °F (37 2 °C) (!) 111 20 141/91 95 %      Temp Source Heart Rate Source Patient Position - Orthostatic VS BP Location FiO2 (%)   Oral Monitor Lying Left arm --      Pain Score       7           Vitals:    07/25/21 1451 07/25/21 1500   BP: 141/91    Pulse: (!) 111 94   Patient Position - Orthostatic VS: Lying          Visual Acuity      ED Medications  Medications - No data to display    Diagnostic Studies  Results Reviewed     Procedure Component Value Units Date/Time    Comprehensive metabolic panel [[id number]] Updated: 07/25/21 1511    Lab Status: No result Specimen: Blood from Arm, Left     Troponin I [[id number]] Updated: 07/25/21 1510    Lab Status: No result Specimen: Blood from Arm, Left     CBC and differential [[id number]]  (Abnormal) Collected: 07/25/21 1457    Lab Status: Final result Specimen: Blood from Arm, Left Updated: 07/25/21 1503     WBC 9 28 Thousand/uL      RBC 5 23 Million/uL      Hemoglobin 16 8 g/dL      Hematocrit 49 9 %      MCV 95 fL      MCH 32 1 pg      MCHC 33 7 g/dL      RDW 13 6 %      MPV 10 9 fL      Platelets 921 Thousands/uL      nRBC 0 /100 WBCs      Neutrophils Relative 50 %      Immat GRANS % 0 %      Lymphocytes Relative 41 %      Monocytes Relative 6 %      Eosinophils Relative 2 %      Basophils Relative 1 %      Neutrophils Absolute 4 65 Thousands/µL      Immature Grans Absolute 0 02 Thousand/uL      Lymphocytes Absolute 3 77 Thousands/µL      Monocytes Absolute 0 56 Thousand/µL      Eosinophils Absolute 0 22 Thousand/µL      Basophils Absolute 0 06 Thousands/µL                  XR chest 1 view portable (Results Pending)              Procedures  Procedures         ED Course                                           MDM  Number of Diagnoses or Management Options  Chest pain  Diagnosis management comments: Pulse ox 95 percent on room air indicating adequate oxygenation  Shortly after arrival after blood work was sent patient requests to sign out against medical advice, after he was not given anything anxiety  Patient states that we are "all in cahoots" with the  was attempting record conversations on his phone because his "rights are being violated "  Advised patient he is signing out against medical advice before his work up is complete  Risking death and disability from undiagnosed medical conditions  Patient verbalized understanding but would still like to leave AMA  Amount and/or Complexity of Data Reviewed  Clinical lab tests: ordered and reviewed  Tests in the radiology section of CPT®: ordered and reviewed  Decide to obtain previous medical records or to obtain history from someone other than the patient: yes  Review and summarize past medical records: yes  Independent visualization of images, tracings, or specimens: yes        Disposition  Final diagnoses:   Chest pain     Time reflects when diagnosis was documented in both MDM as applicable and the Disposition within this note     Time User Action Codes Description Comment    7/25/2021  3:03 PM Candido Noonan Add [R07 9] Chest pain       ED Disposition     ED Disposition Condition Date/Time Comment    [facility] Jul 25, 2021  3:03 PM Date: 7/25/2021  Patient: Kat Blakely  Admitted: 7/25/2021  2:46 PM  Attending Provider: DO Kat Chong or his authorized caregiver has made the decision for the patient to leave the emergency department against the advice  of the emergency department staff  He or his authorized caregiver has been informed and understands the inherent risks, including death, MI    He or his authorized caregiver has decided to accept the responsibility for this decision  Josette Alcantar  and all necessary parties have been advised that he may return for further evaluation or treatment  His condition at time of discharge was stable  Josette Alcantar had current vital signs as follows:  /91 (BP Location: Left arm)   Pulse 94    Temp 99 °F (37 2 °C) (Oral)   Resp 13   Wt 90 7 kg (200 lb)         Follow-up Information     Follow up With Specialties Details Why Contact Info    [employer]  In 1 week [phone]            Patient's Medications   Discharge Prescriptions    No medications on file     No discharge procedures on file      PDMP Review       Value Time User    PDMP Reviewed  Yes 4/7/2021  6:30 PM Kamar Ambriz MD          ED Provider  Electronically Signed by           Bushra Pena DO  07/25/21 0149

## 2021-07-25 NOTE — ED PROCEDURE NOTE
PROCEDURE  ECG 12 Lead Documentation Only    Date/Time: 7/25/2021 2:49 PM  Performed by: Noris George DO  Authorized by: Noris George DO     ECG reviewed by me, the ED Provider: yes    Patient location:  ED  Interpretation:     Interpretation: normal    Rate:     ECG rate:  108    ECG rate assessment: tachycardic    Rhythm:     Rhythm: sinus rhythm    Ectopy:     Ectopy: none    ST segments:     ST segments:  Normal  T waves:     T waves: normal    Other findings:     Other findings: CORDELL George DO  07/25/21 1450

## 2021-07-27 DIAGNOSIS — K59.00 CONSTIPATION, UNSPECIFIED CONSTIPATION TYPE: ICD-10-CM

## 2021-07-27 DIAGNOSIS — F17.200 CURRENT EVERY DAY SMOKER: ICD-10-CM

## 2021-07-27 DIAGNOSIS — R10.11 RUQ PAIN: ICD-10-CM

## 2021-07-27 RX ORDER — BISACODYL 10 MG
10 SUPPOSITORY, RECTAL RECTAL DAILY
Qty: 12 SUPPOSITORY | Refills: 0 | Status: SHIPPED | OUTPATIENT
Start: 2021-07-27 | End: 2021-10-06 | Stop reason: SDUPTHER

## 2021-07-27 RX ORDER — NICOTINE 21 MG/24HR
1 PATCH, TRANSDERMAL 24 HOURS TRANSDERMAL EVERY 24 HOURS
Qty: 28 PATCH | Refills: 0 | Status: SHIPPED | OUTPATIENT
Start: 2021-07-27 | End: 2021-10-06 | Stop reason: SDUPTHER

## 2021-07-28 RX ORDER — PANTOPRAZOLE SODIUM 40 MG/1
40 TABLET, DELAYED RELEASE ORAL 2 TIMES DAILY
Qty: 180 TABLET | Refills: 1 | Status: SHIPPED | OUTPATIENT
Start: 2021-07-28 | End: 2021-10-06 | Stop reason: SDUPTHER

## 2021-09-21 ENCOUNTER — APPOINTMENT (EMERGENCY)
Dept: RADIOLOGY | Facility: HOSPITAL | Age: 36
End: 2021-09-21
Payer: COMMERCIAL

## 2021-09-21 ENCOUNTER — HOSPITAL ENCOUNTER (EMERGENCY)
Facility: HOSPITAL | Age: 36
Discharge: HOME | End: 2021-09-21
Attending: EMERGENCY MEDICINE | Admitting: EMERGENCY MEDICINE
Payer: COMMERCIAL

## 2021-09-21 VITALS
TEMPERATURE: 97.6 F | RESPIRATION RATE: 18 BRPM | SYSTOLIC BLOOD PRESSURE: 148 MMHG | HEART RATE: 68 BPM | WEIGHT: 199 LBS | DIASTOLIC BLOOD PRESSURE: 86 MMHG | OXYGEN SATURATION: 97 % | BODY MASS INDEX: 26.99 KG/M2

## 2021-09-21 DIAGNOSIS — F41.9 ANXIETY: ICD-10-CM

## 2021-09-21 DIAGNOSIS — R74.01 TRANSAMINITIS: ICD-10-CM

## 2021-09-21 DIAGNOSIS — R11.2 NAUSEA & VOMITING: Primary | ICD-10-CM

## 2021-09-21 DIAGNOSIS — R52 BODY ACHES: ICD-10-CM

## 2021-09-21 LAB
ALBUMIN SERPL BCP-MCNC: 4.4 G/DL (ref 3.5–5)
ALP SERPL-CCNC: 61 U/L (ref 46–116)
ALT SERPL W P-5'-P-CCNC: 242 U/L (ref 12–78)
AMPHETAMINES SERPL QL SCN: NEGATIVE
ANION GAP SERPL CALCULATED.3IONS-SCNC: 11 MMOL/L (ref 4–13)
AST SERPL W P-5'-P-CCNC: 127 U/L (ref 5–45)
BARBITURATES UR QL: NEGATIVE
BASOPHILS # BLD AUTO: 0.02 THOUSANDS/ΜL (ref 0–0.1)
BASOPHILS NFR BLD AUTO: 0 % (ref 0–1)
BENZODIAZ UR QL: NEGATIVE
BILIRUB SERPL-MCNC: 1.29 MG/DL (ref 0.2–1)
BILIRUB UR QL STRIP: NEGATIVE
BUN SERPL-MCNC: 11 MG/DL (ref 5–25)
CALCIUM SERPL-MCNC: 9.2 MG/DL (ref 8.3–10.1)
CHLORIDE SERPL-SCNC: 96 MMOL/L (ref 100–108)
CLARITY UR: CLEAR
CO2 SERPL-SCNC: 30 MMOL/L (ref 21–32)
COCAINE UR QL: NEGATIVE
COLOR UR: YELLOW
CREAT SERPL-MCNC: 0.86 MG/DL (ref 0.6–1.3)
EOSINOPHIL # BLD AUTO: 0.04 THOUSAND/ΜL (ref 0–0.61)
EOSINOPHIL NFR BLD AUTO: 1 % (ref 0–6)
ERYTHROCYTE [DISTWIDTH] IN BLOOD BY AUTOMATED COUNT: 12.2 % (ref 11.6–15.1)
GFR SERPL CREATININE-BSD FRML MDRD: 112 ML/MIN/1.73SQ M
GLUCOSE SERPL-MCNC: 101 MG/DL (ref 65–140)
GLUCOSE UR STRIP-MCNC: NEGATIVE MG/DL
HCT VFR BLD AUTO: 48.5 % (ref 36.5–49.3)
HGB BLD-MCNC: 16.5 G/DL (ref 12–17)
HGB UR QL STRIP.AUTO: NEGATIVE
IMM GRANULOCYTES # BLD AUTO: 0.01 THOUSAND/UL (ref 0–0.2)
IMM GRANULOCYTES NFR BLD AUTO: 0 % (ref 0–2)
KETONES UR STRIP-MCNC: ABNORMAL MG/DL
LEUKOCYTE ESTERASE UR QL STRIP: NEGATIVE
LIPASE SERPL-CCNC: 40 U/L (ref 73–393)
LYMPHOCYTES # BLD AUTO: 1 THOUSANDS/ΜL (ref 0.6–4.47)
LYMPHOCYTES NFR BLD AUTO: 18 % (ref 14–44)
MAGNESIUM SERPL-MCNC: 2.1 MG/DL (ref 1.6–2.6)
MCH RBC QN AUTO: 32.7 PG (ref 26.8–34.3)
MCHC RBC AUTO-ENTMCNC: 34 G/DL (ref 31.4–37.4)
MCV RBC AUTO: 96 FL (ref 82–98)
METHADONE UR QL: POSITIVE
MONOCYTES # BLD AUTO: 0.46 THOUSAND/ΜL (ref 0.17–1.22)
MONOCYTES NFR BLD AUTO: 9 % (ref 4–12)
NEUTROPHILS # BLD AUTO: 3.9 THOUSANDS/ΜL (ref 1.85–7.62)
NEUTS SEG NFR BLD AUTO: 72 % (ref 43–75)
NITRITE UR QL STRIP: NEGATIVE
NRBC BLD AUTO-RTO: 0 /100 WBCS
OPIATES UR QL SCN: POSITIVE
OXYCODONE+OXYMORPHONE UR QL SCN: NEGATIVE
PCP UR QL: NEGATIVE
PH UR STRIP.AUTO: 8 [PH]
PLATELET # BLD AUTO: 175 THOUSANDS/UL (ref 149–390)
PMV BLD AUTO: 10.7 FL (ref 8.9–12.7)
POTASSIUM SERPL-SCNC: 4.4 MMOL/L (ref 3.5–5.3)
PROT SERPL-MCNC: 8 G/DL (ref 6.4–8.2)
PROT UR STRIP-MCNC: NEGATIVE MG/DL
RBC # BLD AUTO: 5.04 MILLION/UL (ref 3.88–5.62)
SODIUM SERPL-SCNC: 137 MMOL/L (ref 136–145)
SP GR UR STRIP.AUTO: <=1.005 (ref 1–1.03)
THC UR QL: POSITIVE
UROBILINOGEN UR QL STRIP.AUTO: 1 E.U./DL
WBC # BLD AUTO: 5.43 THOUSAND/UL (ref 4.31–10.16)

## 2021-09-21 PROCEDURE — G1004 CDSM NDSC: HCPCS

## 2021-09-21 PROCEDURE — 99285 EMERGENCY DEPT VISIT HI MDM: CPT | Performed by: PHYSICIAN ASSISTANT

## 2021-09-21 PROCEDURE — 99284 EMERGENCY DEPT VISIT MOD MDM: CPT

## 2021-09-21 PROCEDURE — 74177 CT ABD & PELVIS W/CONTRAST: CPT

## 2021-09-21 PROCEDURE — 96374 THER/PROPH/DIAG INJ IV PUSH: CPT

## 2021-09-21 PROCEDURE — 96375 TX/PRO/DX INJ NEW DRUG ADDON: CPT

## 2021-09-21 PROCEDURE — 81003 URINALYSIS AUTO W/O SCOPE: CPT | Performed by: PHYSICIAN ASSISTANT

## 2021-09-21 PROCEDURE — C9113 INJ PANTOPRAZOLE SODIUM, VIA: HCPCS | Performed by: PHYSICIAN ASSISTANT

## 2021-09-21 PROCEDURE — 76705 ECHO EXAM OF ABDOMEN: CPT

## 2021-09-21 PROCEDURE — 80307 DRUG TEST PRSMV CHEM ANLYZR: CPT | Performed by: PHYSICIAN ASSISTANT

## 2021-09-21 PROCEDURE — 83690 ASSAY OF LIPASE: CPT | Performed by: PHYSICIAN ASSISTANT

## 2021-09-21 PROCEDURE — 83735 ASSAY OF MAGNESIUM: CPT | Performed by: PHYSICIAN ASSISTANT

## 2021-09-21 PROCEDURE — 36415 COLL VENOUS BLD VENIPUNCTURE: CPT | Performed by: PHYSICIAN ASSISTANT

## 2021-09-21 PROCEDURE — 80074 ACUTE HEPATITIS PANEL: CPT | Performed by: PHYSICIAN ASSISTANT

## 2021-09-21 PROCEDURE — 85025 COMPLETE CBC W/AUTO DIFF WBC: CPT | Performed by: PHYSICIAN ASSISTANT

## 2021-09-21 PROCEDURE — 96361 HYDRATE IV INFUSION ADD-ON: CPT

## 2021-09-21 PROCEDURE — 80053 COMPREHEN METABOLIC PANEL: CPT | Performed by: PHYSICIAN ASSISTANT

## 2021-09-21 RX ORDER — ONDANSETRON 2 MG/ML
4 INJECTION INTRAMUSCULAR; INTRAVENOUS ONCE
Status: COMPLETED | OUTPATIENT
Start: 2021-09-21 | End: 2021-09-21

## 2021-09-21 RX ORDER — METOCLOPRAMIDE HYDROCHLORIDE 5 MG/ML
10 INJECTION INTRAMUSCULAR; INTRAVENOUS ONCE
Status: COMPLETED | OUTPATIENT
Start: 2021-09-21 | End: 2021-09-21

## 2021-09-21 RX ORDER — PANTOPRAZOLE SODIUM 40 MG/1
40 INJECTION, POWDER, FOR SOLUTION INTRAVENOUS ONCE
Status: COMPLETED | OUTPATIENT
Start: 2021-09-21 | End: 2021-09-21

## 2021-09-21 RX ORDER — PROMETHAZINE HYDROCHLORIDE 25 MG/1
25 TABLET ORAL EVERY 6 HOURS PRN
Qty: 30 TABLET | Refills: 0 | Status: SHIPPED | OUTPATIENT
Start: 2021-09-21 | End: 2021-10-15

## 2021-09-21 RX ORDER — MORPHINE SULFATE 4 MG/ML
4 INJECTION, SOLUTION INTRAMUSCULAR; INTRAVENOUS ONCE
Status: COMPLETED | OUTPATIENT
Start: 2021-09-21 | End: 2021-09-21

## 2021-09-21 RX ORDER — HYDROXYZINE PAMOATE 25 MG/1
25 CAPSULE ORAL 3 TIMES DAILY PRN
Qty: 30 CAPSULE | Refills: 0 | Status: SHIPPED | OUTPATIENT
Start: 2021-09-21 | End: 2021-10-15 | Stop reason: SDUPTHER

## 2021-09-21 RX ORDER — ONDANSETRON 2 MG/ML
4 INJECTION INTRAMUSCULAR; INTRAVENOUS ONCE
Status: DISCONTINUED | OUTPATIENT
Start: 2021-09-21 | End: 2021-09-21

## 2021-09-21 RX ORDER — CYCLOBENZAPRINE HCL 10 MG
10 TABLET ORAL 2 TIMES DAILY PRN
Qty: 10 TABLET | Refills: 0 | Status: SHIPPED | OUTPATIENT
Start: 2021-09-21 | End: 2021-10-15

## 2021-09-21 RX ORDER — LORAZEPAM 2 MG/ML
1 INJECTION INTRAMUSCULAR ONCE
Status: COMPLETED | OUTPATIENT
Start: 2021-09-21 | End: 2021-09-21

## 2021-09-21 RX ORDER — DIPHENHYDRAMINE HYDROCHLORIDE 50 MG/ML
25 INJECTION INTRAMUSCULAR; INTRAVENOUS ONCE
Status: COMPLETED | OUTPATIENT
Start: 2021-09-21 | End: 2021-09-21

## 2021-09-21 RX ADMIN — MORPHINE SULFATE 4 MG: 4 INJECTION INTRAVENOUS at 11:38

## 2021-09-21 RX ADMIN — DIPHENHYDRAMINE HYDROCHLORIDE 25 MG: 50 INJECTION, SOLUTION INTRAMUSCULAR; INTRAVENOUS at 13:29

## 2021-09-21 RX ADMIN — METOCLOPRAMIDE HYDROCHLORIDE 10 MG: 5 INJECTION INTRAMUSCULAR; INTRAVENOUS at 13:31

## 2021-09-21 RX ADMIN — LORAZEPAM 1 MG: 2 INJECTION INTRAMUSCULAR; INTRAVENOUS at 12:55

## 2021-09-21 RX ADMIN — PANTOPRAZOLE SODIUM 40 MG: 40 INJECTION, POWDER, FOR SOLUTION INTRAVENOUS at 11:40

## 2021-09-21 RX ADMIN — SODIUM CHLORIDE 1000 ML: 0.9 INJECTION, SOLUTION INTRAVENOUS at 11:18

## 2021-09-21 RX ADMIN — IOHEXOL 100 ML: 350 INJECTION, SOLUTION INTRAVENOUS at 13:10

## 2021-09-21 RX ADMIN — ONDANSETRON 4 MG: 2 INJECTION INTRAMUSCULAR; INTRAVENOUS at 11:21

## 2021-09-21 NOTE — ED NOTES
Pt states swelling in right upper arm feeling better  Offered additional warm compresses  Pt declined        Bel Hollis, RN  09/21/21 4319

## 2021-09-21 NOTE — ED PROVIDER NOTES
History  Chief Complaint   Patient presents with    Nausea     Pt states he's had nausea and vomiting continuously for the last month and has stomach cramps  Fever comes and goes   Vomiting     42-year-old male history of hypertension, opioid abuse on methadone, IBS, GERD presents with abdominal pain x1 month  He has recurrent episodes of nausea and vomiting daily over the past month  States the vomit is bilious  He was seen in the ER for this several months ago and was given bentyl and prilosec which he has been taking with minimal relief  Tried PO zofran at home with no relief  He states his anxiety is making this worse  He is not prescribed medication for anxiety at this time  He saw GI physician, had an EGD done last year which was unremarkable  He is due to get a colonoscopy however he and his wife states that they want a 2nd opinion they are looking for a new GI specialist   Reports having subjective fever at night time, has never checked his temperature with a thermometer  He reports 20 lb weight loss in the past month from continuous vomiting  Last bowel movement was this morning, soft nonbloody  No chest pain, difficulty breathing, shortness of breath  No headache, dizziness, lightheadedness, weakness  No problems with urination  Prior to Admission Medications   Prescriptions Last Dose Informant Patient Reported? Taking?    METHADONE HCL PO  Self Yes No   Sig: Take 138 mg by mouth daily [facility]   albuterol (PROVENTIL HFA,VENTOLIN HFA) 90 mcg/act inhaler  Self No No   Sig: Inhale 2 puffs every 6 (six) hours as needed for wheezing   bisacodyl (DULCOLAX) 10 mg suppository   No No   Sig: Insert 1 suppository (10 mg total) into the rectum daily   dicyclomine (BENTYL) 20 mg tablet  Self No No   Sig: Take 1 tablet (20 mg total) by mouth 2 (two) times a day   ibuprofen (MOTRIN) 600 mg tablet   No No   Sig: take 1 tablet by mouth every 6 hours   metoclopramide (REGLAN) 10 mg tablet Self No No   Sig: Take 1 tablet (10 mg total) by mouth every 6 (six) hours   nicotine (RA Nicotine) 21 mg/24 hr TD 24 hr patch   No No   Sig: Place 1 patch on the skin every 24 hours   ondansetron (ZOFRAN) 4 mg tablet   No No   Sig: take 1 tablet by mouth every 8 hours if needed for nausea or vomiting   pantoprazole (PROTONIX) 40 mg tablet   No No   Sig: Take 1 tablet (40 mg total) by mouth 2 (two) times a day   polyethylene glycol (MIRALAX) 17 g packet  Self No No   Sig: Take 17 g by mouth daily   senna (SENOKOT) 8 6 mg  Self No No   Sig: Take 1 tablet (8 6 mg total) by mouth daily at bedtime   sildenafil (VIAGRA) 50 MG tablet  Self No No   Sig: Take 1 tablet (50 mg total) by mouth daily as needed for erectile dysfunction   testosterone cypionate (DEPO-TESTOSTERONE) 200 mg/mL SOLN  Self Yes No   Sig: INJECT 100 MG EVERY 2 WEEKS AS DIRECTED      Facility-Administered Medications: None       Past Medical History:   Diagnosis Date    Abscess of right hand 12/11/2020    ADHD (attention deficit hyperactivity disorder)     Hypertension     Inguinal hernia     Laceration of right hand without foreign body 12/18/2020    [facility]adone maintenance therapy patient [facility] [address] [facility]     Opiate addiction ([facility] [address] [facility]     Psychiatric disorder     anxiety, depression, social personality diorder    Renal calculus, bilateral 3/7/2017       History reviewed  No pertinent surgical history  Family History   Problem Relation Age of Onset    Alcohol abuse Mother     Substance Abuse Mother     Hypertension Mother     Substance Abuse Father     Alcohol abuse Father     Heart disease Father     Arthritis Father     Mental illness Brother     Dementia Maternal Grandfather     Heart disease Maternal Grandfather     Diabetes Maternal Grandfather     Arthritis Maternal Grandfather      I have reviewed and agree with the history as documented      E-Cigarette/Vaping    E-Cigarette Use Never User      E-Cigarette/Vaping Substances    Nicotine No     THC No     CBD No     Flavoring No     Other No     Unknown No      Social History     Tobacco Use    Smoking status: Current Every Day Smoker     Packs/day: 2 00     Types: Cigarettes    Smokeless tobacco: Never Used   Vaping Use    Vaping Use: Never used   Substance Use Topics    Alcohol use: Yes     Comment: soc    Drug use: Not Currently     Types: Marijuana     Comment: states on methadone and smoke weed       Review of Systems   Constitutional: Negative for chills and fever  HENT: Negative for sneezing and sore throat  Respiratory: Negative for cough and shortness of breath  Cardiovascular: Negative for chest pain, palpitations and leg swelling  Gastrointestinal: Positive for abdominal pain, nausea and vomiting  Negative for constipation and diarrhea  Genitourinary: Negative for decreased urine volume, difficulty urinating, discharge, dysuria, enuresis, flank pain, frequency, genital sores, hematuria, penile pain, penile swelling, scrotal swelling, testicular pain and urgency  Musculoskeletal: Negative for back pain, gait problem, joint swelling and myalgias  Skin: Negative for color change, pallor, rash and wound  Neurological: Negative for dizziness, syncope, weakness, light-headedness, numbness and headaches  All other systems reviewed and are negative  Physical Exam  Physical Exam  Vitals and nursing note reviewed  Constitutional:       General: He is not in acute distress  Appearance: Normal appearance  He is well-developed and normal weight  He is ill-appearing and diaphoretic  HENT:      Head: Normocephalic and atraumatic  Nose: Nose normal    Eyes:      Extraocular Movements: Extraocular movements intact  Conjunctiva/sclera: Conjunctivae normal    Cardiovascular:      Rate and Rhythm: Normal rate and regular rhythm  Pulses: Normal pulses  Heart sounds: Normal heart sounds  No murmur heard  No friction rub  No gallop      Pulmonary: Effort: Pulmonary effort is normal  No respiratory distress  Breath sounds: Normal breath sounds  No stridor  No wheezing or rales  Comments: Sp02 is 98% indicating adequate oxygenation on room air  Abdominal:      General: Abdomen is flat  Bowel sounds are normal  There is no distension  Palpations: Abdomen is soft  Tenderness: There is generalized abdominal tenderness  There is no guarding or rebound  Musculoskeletal:         General: Normal range of motion  Cervical back: Normal range of motion and neck supple  Skin:     General: Skin is warm  Capillary Refill: Capillary refill takes less than 2 seconds  Coloration: Skin is not pale  Findings: No erythema or rash  Neurological:      Mental Status: He is alert  Mental status is at baseline           Vital Signs  ED Triage Vitals [09/21/21 1013]   Temperature Pulse Respirations Blood Pressure SpO2   97 6 °F (36 4 °C) 71 18 166/88 98 %      Temp Source Heart Rate Source Patient Position - Orthostatic VS BP Location FiO2 (%)   Tympanic Monitor Lying Left arm --      Pain Score       7           Vitals:    09/21/21 1013 09/21/21 1258   BP: 166/88 148/86   Pulse: 71 68   Patient Position - Orthostatic VS: Lying Lying         Visual Acuity      ED Medications  Medications   sodium chloride 0 9 % bolus 1,000 mL (0 mL Intravenous Stopped 9/21/21 1210)   morphine (PF) 4 mg/mL injection 4 mg (4 mg Intravenous Given 9/21/21 1138)   pantoprazole (PROTONIX) injection 40 mg (40 mg Intravenous Given 9/21/21 1140)   ondansetron (ZOFRAN) injection 4 mg (4 mg Intravenous Given 9/21/21 1121)   LORazepam (ATIVAN) injection 1 mg (1 mg Intravenous Given 9/21/21 1255)   iohexol (OMNIPAQUE) 350 MG/ML injection (SINGLE-DOSE) 100 mL (100 mL Intravenous Given 9/21/21 1310)   metoclopramide (REGLAN) injection 10 mg (10 mg Intravenous Given 9/21/21 1331)   diphenhydrAMINE (BENADRYL) injection 25 mg (25 mg Intravenous Given 9/21/21 1329) Diagnostic Studies  Results Reviewed     Procedure Component Value Units Date/Time    Rapid drug screen, urine [[id number]]  (Abnormal) Collected: 09/21/21 1307    Lab Status: Final result Specimen: Urine, Clean Catch Updated: 09/21/21 1333     Amph/Meth UR Negative     Barbiturate Ur Negative     Benzodiazepine Urine Negative     Cocaine Urine Negative     Methadone Urine Positive     Opiate Urine Positive     PCP Ur Negative     THC Urine Positive     Oxycodone Urine Negative    Narrative:      Presumptive report  If requested, specimen will be sent to reference lab for confirmation  FOR MEDICAL PURPOSES ONLY  IF CONFIRMATION NEEDED PLEASE CONTACT THE LAB WITHIN 5 DAYS  Drug Screen Cutoff Levels:  AMPHETAMINE/METHAMPHETAMINES  1000 ng/mL  BARBITURATES     200 ng/mL  BENZODIAZEPINES     200 ng/mL  COCAINE      300 ng/mL  METHADONE      300 ng/mL  OPIATES      300 ng/mL  PHENCYCLIDINE     25 ng/mL  THC       50 ng/mL  OXYCODONE      100 ng/mL    UA w Reflex to Microscopic w Reflex to Culture [[id number]]  (Abnormal) Collected: 09/21/21 1308    Lab Status: Final result Specimen: Urine, Clean Catch Updated: 09/21/21 1320     Color, UA Yellow     Clarity, UA Clear     Specific Gravity, UA <=1 005     pH, UA 8 0     Leukocytes, UA Negative     Nitrite, UA Negative     Protein, UA Negative mg/dl      Glucose, UA Negative mg/dl      Ketones, UA 15 (1+) mg/dl      Urobilinogen, UA 1 0 E U /dl      Bilirubin, UA Negative     Blood, UA Negative    Hepatitis panel, acute [[id number]] Collected: 09/21/21 1309    Lab Status:  In process Specimen: Blood from Arm, Left Updated: 09/21/21 1312    Comprehensive metabolic panel [[id number]]  (Abnormal) Collected: 09/21/21 1105    Lab Status: Final result Specimen: Blood from Arm, Right Updated: 09/21/21 1145     Sodium 137 mmol/L      Potassium 4 4 mmol/L      Chloride 96 mmol/L      CO2 30 mmol/L      ANION GAP 11 mmol/L      BUN 11 mg/dL      Creatinine 0 86 mg/dL Glucose 101 mg/dL      Calcium 9 2 mg/dL       U/L       U/L      Alkaline Phosphatase 61 U/L      Total Protein 8 0 g/dL      Albumin 4 4 g/dL      Total Bilirubin 1 29 mg/dL      eGFR 112 ml/min/1 73sq m     Narrative:      [place] guidelines for Chronic Kidney Disease (CKD):     Stage 1 with normal or high GFR (GFR > 90 mL/min/1 73 square meters)    Stage 2 Mild CKD (GFR = 60-89 mL/min/1 73 square meters)    Stage 3A Moderate CKD (GFR = 45-59 mL/min/1 73 square meters)    Stage 3B Moderate CKD (GFR = 30-44 mL/min/1 73 square meters)    Stage 4 Severe CKD (GFR = 15-29 mL/min/1 73 square meters)    Stage 5 End Stage CKD (GFR <15 mL/min/1 73 square meters)  Note: GFR calculation is accurate only with a steady state creatinine    Lipase [[id number]]  (Abnormal) Collected: 09/21/21 1105    Lab Status: Final result Specimen: Blood from Arm, Right Updated: 09/21/21 1145     Lipase 40 u/L     Magnesium [[id number]]  (Normal) Collected: 09/21/21 1105    Lab Status: Final result Specimen: Blood from Arm, Right Updated: 09/21/21 1145     Magnesium 2 1 mg/dL     CBC and differential [[id number]] Collected: 09/21/21 1105    Lab Status: Final result Specimen: Blood from Arm, Right Updated: 09/21/21 1129     WBC 5 43 Thousand/uL      RBC 5 04 Million/uL      Hemoglobin 16 5 g/dL      Hematocrit 48 5 %      MCV 96 fL      MCH 32 7 pg      MCHC 34 0 g/dL      RDW 12 2 %      MPV 10 7 fL      Platelets 800 Thousands/uL      nRBC 0 /100 WBCs      Neutrophils Relative 72 %      Immat GRANS % 0 %      Lymphocytes Relative 18 %      Monocytes Relative 9 %      Eosinophils Relative 1 %      Basophils Relative 0 %      Neutrophils Absolute 3 90 Thousands/µL      Immature Grans Absolute 0 01 Thousand/uL      Lymphocytes Absolute 1 00 Thousands/µL      Monocytes Absolute 0 46 Thousand/µL      Eosinophils Absolute 0 04 Thousand/µL      Basophils Absolute 0 02 Thousands/µL                  CT abdomen pelvis with contrast   Final Result by Jo Weber MD (09/21 1320)      No acute inflammatory process  Fatty liver  2 mm nonobstructing left lower pole intrarenal calculus  Workstation performed: [id number]         US right upper quadrant   Final Result by Samy Lerner MD (09/21 1214)      Normal gallbladder  Hepatic steatosis  Workstation performed: [id number]                    Procedures  ECG 12 Lead Documentation Only    Date/Time: 9/21/2021 11:06 AM  Performed by: Clarisse Farris PA-C  Authorized by: Clarisse Farris PA-C     Indications / Diagnosis:  Diaphoresis, vomiting  Patient location:  ED  Interpretation:     Interpretation: normal    Rate:     ECG rate:  66    ECG rate assessment: normal    Rhythm:     Rhythm: sinus rhythm    Ectopy:     Ectopy: none    QRS:     QRS axis:  Normal    QRS intervals:  Normal  Conduction:     Conduction: normal    ST segments:     ST segments:  Normal  T waves:     T waves: normal               ED Course  ED Course as of Sep 21 1441   Tue Sep 21, 2021   1200 Transaminitis with elevated t bili - will check acute hepatitis panel, getting RUQ US now   Comprehensive metabolic panel(!)   9834 US guided IV infiltrated - area is erythematous and swollen  Warm compress applied  1355 Patient states he feels significantly improved         1403 Obtained after morphine   Rapid drug screen, urine(!)                                           MDM  Number of Diagnoses or Management Options  Anxiety  Body aches  Nausea & vomiting  Transaminitis  Diagnosis management comments: Suspect cyclic nausea and vomiting to be multifactorial - hyperemesis cannabinoid syndrome vs anxiety vs other GI etiology unable to eval for at time of visit  Advised to d/c marijuana and observe for any improvement in symptoms  Will treat with phenergan for nausea as pt reports zofran and reglan PO do not help  Will treat for generalized body spasm/belly spasms with flexeril  Will treat anxiety with hydroxyzine, strongly recommended to follow up with psychiatrist for re-eval  Follow up GI  Gave patient proper education regarding diagnosis  Answered all questions  Return to ED for any worsening of symptoms otherwise follow up with primary care physician for re-evaluation  Discussed plan with patient who verbalized understanding and agreed to plan  Amount and/or Complexity of Data Reviewed  Clinical lab tests: reviewed and ordered  Tests in the radiology section of CPT®: ordered and reviewed  Tests in the medicine section of CPT®: ordered and reviewed  Discussion of test results with the performing providers: yes  Review and summarize past medical records: yes  Discuss the patient with other providers: yes (Discussed case with Dr Alex Sparks)  Independent visualization of images, tracings, or specimens: yes        Disposition  Final diagnoses:   Nausea & vomiting   Transaminitis   Body aches   Anxiety     Time reflects when diagnosis was documented in both MDM as applicable and the Disposition within this note     Time User Action Codes Description Comment    9/21/2021  1:59 PM Erika Budds Add [R11 2] Nausea & vomiting     9/21/2021  1:59 PM Erika Budds Add [R74 01] Transaminitis     9/21/2021  1:59 PM Erika Budds Add [R52] Body aches     9/21/2021  2:01 PM Erika Budds Add [F41 9] Anxiety       ED Disposition     ED Disposition Condition Date/Time Comment    Discharge Stable Tue Sep 21, 2021  1:59  [facility] discharge to home/self care              Follow-up Information     Follow up With Specialties Details Why Contact Info Additional Information    Elia Friedman MD Gastroenterology Schedule an appointment as soon as possible for a visit in 3 days As needed, If symptoms worsen [id number] [facility]   [phone]       [address] [facility] Emergency Department Emergency Medicine Go to  As needed [address] [facility] [place] [address]  [address] [address] Emergency Department, [place], [place], [address]          Discharge Medication List as of 9/21/2021  2:02 PM      START taking these medications    Details   cyclobenzaprine (FLEXERIL) 10 mg tablet Take 1 tablet (10 mg total) by mouth 2 (two) times a day as needed for muscle spasms for up to 5 days, Starting Tue 9/21/2021, Until Sun 9/26/2021 at 2359, Normal      hydrOXYzine pamoate (VISTARIL) 25 mg capsule Take 1 capsule (25 mg total) by mouth 3 (three) times a day as needed for anxiety, Starting Tue 9/21/2021, Normal      promethazine (PHENERGAN) 25 mg tablet Take 1 tablet (25 mg total) by mouth every 6 (six) hours as needed for nausea or vomiting, Starting Tue 9/21/2021, Normal         CONTINUE these medications which have NOT CHANGED    Details   albuterol (PROVENTIL HFA,VENTOLIN HFA) 90 mcg/act inhaler Inhale 2 puffs every 6 (six) hours as needed for wheezing, Starting Tue 12/15/2020, Normal      bisacodyl (DULCOLAX) 10 mg suppository Insert 1 suppository (10 mg total) into the rectum daily, Starting Tue 7/27/2021, Normal      dicyclomine (BENTYL) 20 mg tablet Take 1 tablet (20 mg total) by mouth 2 (two) times a day, Starting Mon 5/3/2021, Normal      ibuprofen (MOTRIN) 600 mg tablet take 1 tablet by mouth every 6 hours, Normal      METHADONE HCL PO Take 138 mg by mouth daily [facility], Historical Med      metoclopramide (REGLAN) 10 mg tablet Take 1 tablet (10 mg total) by mouth every 6 (six) hours, Starting Mon 5/3/2021, Normal      nicotine (RA Nicotine) 21 mg/24 hr TD 24 hr patch Place 1 patch on the skin every 24 hours, Starting Tue 7/27/2021, Normal      ondansetron (ZOFRAN) 4 mg tablet take 1 tablet by mouth every 8 hours if needed for nausea or vomiting, Normal      pantoprazole (PROTONIX) 40 mg tablet Take 1 tablet (40 mg total) by mouth 2 (two) times a day, Starting Wed 7/28/2021, Until Tue 10/26/2021, Normal polyethylene glycol (MIRALAX) 17 g packet Take 17 g by mouth daily, Starting Mon 5/3/2021, Normal      senna (SENOKOT) 8 6 mg Take 1 tablet (8 6 mg total) by mouth daily at bedtime, Starting Mon 5/3/2021, Normal      sildenafil (VIAGRA) 50 MG tablet Take 1 tablet (50 mg total) by mouth daily as needed for erectile dysfunction, Starting Tue 12/15/2020, Normal      testosterone cypionate (DEPO-TESTOSTERONE) 200 mg/mL SOLN INJECT 100 MG EVERY 2 WEEKS AS DIRECTED, Historical Med           No discharge procedures on file      PDMP Review       Value Time User    PDMP Reviewed  Yes 4/7/2021  6:30 PM Chelsy Pierre MD          ED Provider  Electronically Signed by           Krysta Hollis PA-C  09/21/21 6782

## 2021-09-21 NOTE — ED NOTES
Patient ringing call bell - c/o pain at IV site  Right upper arm noted to be very swollen and red  IV fluids immediately stopped and line pulled  Romayne Hoist, PAC at bedside and aware  Warm compress applied to right upper arm         Coni Lara, RN  09/21/21 6876

## 2021-09-21 NOTE — ED NOTES
Multiple attempts being made by multiple RNs to obtain IV access and collect bloodwork  Attempts unsuccessful  Dr Harish Vale made aware and will look with ultrasound       Galindo Richardson RN  09/21/21 7908

## 2021-09-22 LAB
HAV IGM SER QL: ABNORMAL
HBV CORE IGM SER QL: ABNORMAL
HBV SURFACE AG SER QL: ABNORMAL
HCV AB SER QL: ABNORMAL

## 2021-09-28 ENCOUNTER — HOSPITAL ENCOUNTER (EMERGENCY)
Facility: HOSPITAL | Age: 36
Discharge: HOME | End: 2021-09-29
Attending: EMERGENCY MEDICINE | Admitting: EMERGENCY MEDICINE
Payer: OTHER GOVERNMENT

## 2021-09-28 DIAGNOSIS — B19.20 HEPATITIS-C: ICD-10-CM

## 2021-09-28 DIAGNOSIS — R68.89 WITHDRAWAL COMPLAINT: Primary | ICD-10-CM

## 2021-09-28 LAB
BASOPHILS # BLD AUTO: 0.03 THOUSANDS/ΜL (ref 0–0.1)
BASOPHILS NFR BLD AUTO: 1 % (ref 0–1)
EOSINOPHIL # BLD AUTO: 0.1 THOUSAND/ΜL (ref 0–0.61)
EOSINOPHIL NFR BLD AUTO: 2 % (ref 0–6)
ERYTHROCYTE [DISTWIDTH] IN BLOOD BY AUTOMATED COUNT: 11.8 % (ref 11.6–15.1)
HCT VFR BLD AUTO: 41.4 % (ref 36.5–49.3)
HGB BLD-MCNC: 13.7 G/DL (ref 12–17)
IMM GRANULOCYTES # BLD AUTO: 0.01 THOUSAND/UL (ref 0–0.2)
IMM GRANULOCYTES NFR BLD AUTO: 0 % (ref 0–2)
LYMPHOCYTES # BLD AUTO: 2 THOUSANDS/ΜL (ref 0.6–4.47)
LYMPHOCYTES NFR BLD AUTO: 39 % (ref 14–44)
MCH RBC QN AUTO: 32.2 PG (ref 26.8–34.3)
MCHC RBC AUTO-ENTMCNC: 33.1 G/DL (ref 31.4–37.4)
MCV RBC AUTO: 97 FL (ref 82–98)
MONOCYTES # BLD AUTO: 0.53 THOUSAND/ΜL (ref 0.17–1.22)
MONOCYTES NFR BLD AUTO: 10 % (ref 4–12)
NEUTROPHILS # BLD AUTO: 2.45 THOUSANDS/ΜL (ref 1.85–7.62)
NEUTS SEG NFR BLD AUTO: 48 % (ref 43–75)
NRBC BLD AUTO-RTO: 0 /100 WBCS
PLATELET # BLD AUTO: 146 THOUSANDS/UL (ref 149–390)
PMV BLD AUTO: 10.2 FL (ref 8.9–12.7)
RBC # BLD AUTO: 4.26 MILLION/UL (ref 3.88–5.62)
WBC # BLD AUTO: 5.12 THOUSAND/UL (ref 4.31–10.16)

## 2021-09-28 PROCEDURE — 99285 EMERGENCY DEPT VISIT HI MDM: CPT | Performed by: EMERGENCY MEDICINE

## 2021-09-28 PROCEDURE — 83735 ASSAY OF MAGNESIUM: CPT | Performed by: EMERGENCY MEDICINE

## 2021-09-28 PROCEDURE — 36415 COLL VENOUS BLD VENIPUNCTURE: CPT | Performed by: EMERGENCY MEDICINE

## 2021-09-28 PROCEDURE — 99284 EMERGENCY DEPT VISIT MOD MDM: CPT

## 2021-09-28 PROCEDURE — 80143 DRUG ASSAY ACETAMINOPHEN: CPT | Performed by: EMERGENCY MEDICINE

## 2021-09-28 PROCEDURE — 83690 ASSAY OF LIPASE: CPT | Performed by: EMERGENCY MEDICINE

## 2021-09-28 PROCEDURE — 82550 ASSAY OF CK (CPK): CPT | Performed by: EMERGENCY MEDICINE

## 2021-09-28 PROCEDURE — 82077 ASSAY SPEC XCP UR&BREATH IA: CPT | Performed by: EMERGENCY MEDICINE

## 2021-09-28 PROCEDURE — 80179 DRUG ASSAY SALICYLATE: CPT | Performed by: EMERGENCY MEDICINE

## 2021-09-28 PROCEDURE — 85025 COMPLETE CBC W/AUTO DIFF WBC: CPT | Performed by: EMERGENCY MEDICINE

## 2021-09-28 PROCEDURE — 82553 CREATINE MB FRACTION: CPT | Performed by: EMERGENCY MEDICINE

## 2021-09-28 PROCEDURE — 80053 COMPREHEN METABOLIC PANEL: CPT | Performed by: EMERGENCY MEDICINE

## 2021-09-28 PROCEDURE — 84484 ASSAY OF TROPONIN QUANT: CPT | Performed by: EMERGENCY MEDICINE

## 2021-09-28 RX ADMIN — SODIUM CHLORIDE 1000 ML: 0.9 INJECTION, SOLUTION INTRAVENOUS at 23:47

## 2021-09-29 VITALS
RESPIRATION RATE: 20 BRPM | OXYGEN SATURATION: 98 % | DIASTOLIC BLOOD PRESSURE: 78 MMHG | SYSTOLIC BLOOD PRESSURE: 152 MMHG | TEMPERATURE: 97.3 F | HEART RATE: 86 BPM

## 2021-09-29 LAB
ALBUMIN SERPL BCP-MCNC: 4.3 G/DL (ref 3.5–5)
ALP SERPL-CCNC: 54 U/L (ref 46–116)
ALT SERPL W P-5'-P-CCNC: 258 U/L (ref 12–78)
ANION GAP SERPL CALCULATED.3IONS-SCNC: 11 MMOL/L (ref 4–13)
APAP SERPL-MCNC: <2 UG/ML (ref 10–20)
AST SERPL W P-5'-P-CCNC: 92 U/L (ref 5–45)
BILIRUB SERPL-MCNC: 0.89 MG/DL (ref 0.2–1)
BUN SERPL-MCNC: 16 MG/DL (ref 5–25)
CALCIUM SERPL-MCNC: 8.8 MG/DL (ref 8.3–10.1)
CHLORIDE SERPL-SCNC: 101 MMOL/L (ref 100–108)
CK MB SERPL-MCNC: 3.1 NG/ML (ref 0–5)
CK MB SERPL-MCNC: <1 % (ref 0–2.5)
CK SERPL-CCNC: 666 U/L (ref 39–308)
CO2 SERPL-SCNC: 27 MMOL/L (ref 21–32)
CREAT SERPL-MCNC: 1.04 MG/DL (ref 0.6–1.3)
ETHANOL SERPL-MCNC: <3 MG/DL (ref 0–3)
GFR SERPL CREATININE-BSD FRML MDRD: 93 ML/MIN/1.73SQ M
GLUCOSE SERPL-MCNC: 88 MG/DL (ref 65–140)
LIPASE SERPL-CCNC: 38 U/L (ref 73–393)
MAGNESIUM SERPL-MCNC: 2.2 MG/DL (ref 1.6–2.6)
POTASSIUM SERPL-SCNC: 3.8 MMOL/L (ref 3.5–5.3)
PROT SERPL-MCNC: 7.1 G/DL (ref 6.4–8.2)
SALICYLATES SERPL-MCNC: <3 MG/DL (ref 3–20)
SODIUM SERPL-SCNC: 139 MMOL/L (ref 136–145)
TROPONIN I SERPL-MCNC: <0.02 NG/ML

## 2021-09-29 PROCEDURE — 96365 THER/PROPH/DIAG IV INF INIT: CPT

## 2021-09-29 RX ADMIN — THIAMINE HYDROCHLORIDE 100 MG: 100 INJECTION, SOLUTION INTRAMUSCULAR; INTRAVENOUS at 00:12

## 2021-10-06 DIAGNOSIS — R10.11 RUQ PAIN: ICD-10-CM

## 2021-10-06 DIAGNOSIS — K59.00 CONSTIPATION, UNSPECIFIED CONSTIPATION TYPE: ICD-10-CM

## 2021-10-06 DIAGNOSIS — F17.200 CURRENT EVERY DAY SMOKER: ICD-10-CM

## 2021-10-06 RX ORDER — NICOTINE 21 MG/24HR
1 PATCH, TRANSDERMAL 24 HOURS TRANSDERMAL EVERY 24 HOURS
Qty: 28 PATCH | Refills: 0 | Status: SHIPPED | OUTPATIENT
Start: 2021-10-06 | End: 2022-02-09 | Stop reason: SDUPTHER

## 2021-10-06 RX ORDER — PANTOPRAZOLE SODIUM 40 MG/1
40 TABLET, DELAYED RELEASE ORAL 2 TIMES DAILY
Qty: 180 TABLET | Refills: 0 | Status: SHIPPED | OUTPATIENT
Start: 2021-10-06 | End: 2021-10-15 | Stop reason: SDUPTHER

## 2021-10-06 RX ORDER — BISACODYL 10 MG
10 SUPPOSITORY, RECTAL RECTAL DAILY
Qty: 12 SUPPOSITORY | Refills: 0 | Status: SHIPPED | OUTPATIENT
Start: 2021-10-06 | End: 2022-03-18 | Stop reason: ALTCHOICE

## 2021-10-15 ENCOUNTER — OFFICE VISIT (OUTPATIENT)
Dept: FAMILY MEDICINE CLINIC | Facility: CLINIC | Age: 36
End: 2021-10-15
Payer: COMMERCIAL

## 2021-10-15 VITALS
SYSTOLIC BLOOD PRESSURE: 138 MMHG | HEART RATE: 66 BPM | WEIGHT: 200.6 LBS | BODY MASS INDEX: 27.17 KG/M2 | OXYGEN SATURATION: 95 % | RESPIRATION RATE: 16 BRPM | TEMPERATURE: 97.6 F | HEIGHT: 72 IN | DIASTOLIC BLOOD PRESSURE: 70 MMHG

## 2021-10-15 DIAGNOSIS — R74.01 TRANSAMINITIS: Primary | ICD-10-CM

## 2021-10-15 DIAGNOSIS — R10.9 ABDOMINAL PAIN: ICD-10-CM

## 2021-10-15 DIAGNOSIS — K59.00 CONSTIPATION, UNSPECIFIED CONSTIPATION TYPE: ICD-10-CM

## 2021-10-15 DIAGNOSIS — K59.03 DRUG-INDUCED CONSTIPATION: ICD-10-CM

## 2021-10-15 DIAGNOSIS — F41.9 ANXIETY: ICD-10-CM

## 2021-10-15 DIAGNOSIS — R11.2 NAUSEA & VOMITING: ICD-10-CM

## 2021-10-15 DIAGNOSIS — K21.9 GASTROESOPHAGEAL REFLUX DISEASE, UNSPECIFIED WHETHER ESOPHAGITIS PRESENT: ICD-10-CM

## 2021-10-15 DIAGNOSIS — R11.10 VOMITING: ICD-10-CM

## 2021-10-15 DIAGNOSIS — J45.909 ASTHMA, UNSPECIFIED ASTHMA SEVERITY, UNSPECIFIED WHETHER COMPLICATED, UNSPECIFIED WHETHER PERSISTENT: ICD-10-CM

## 2021-10-15 DIAGNOSIS — R10.11 RUQ PAIN: ICD-10-CM

## 2021-10-15 PROBLEM — F11.10 HEROIN ABUSE (HCC): Status: RESOLVED | Noted: 2020-04-06 | Resolved: 2021-10-15

## 2021-10-15 PROCEDURE — 3008F BODY MASS INDEX DOCD: CPT | Performed by: FAMILY MEDICINE

## 2021-10-15 PROCEDURE — 4004F PT TOBACCO SCREEN RCVD TLK: CPT | Performed by: FAMILY MEDICINE

## 2021-10-15 PROCEDURE — 99213 OFFICE O/P EST LOW 20 MIN: CPT | Performed by: FAMILY MEDICINE

## 2021-10-15 RX ORDER — HYDROXYZINE PAMOATE 25 MG/1
25 CAPSULE ORAL 3 TIMES DAILY PRN
Qty: 30 CAPSULE | Refills: 0 | Status: SHIPPED | OUTPATIENT
Start: 2021-10-15 | End: 2021-11-16

## 2021-10-15 RX ORDER — ALBUTEROL SULFATE 90 UG/1
2 AEROSOL, METERED RESPIRATORY (INHALATION) EVERY 6 HOURS PRN
Qty: 8 G | Refills: 0 | Status: SHIPPED | OUTPATIENT
Start: 2021-10-15

## 2021-10-15 RX ORDER — DICYCLOMINE HCL 20 MG
20 TABLET ORAL 2 TIMES DAILY
Qty: 30 TABLET | Refills: 1 | Status: SHIPPED | OUTPATIENT
Start: 2021-10-15 | End: 2021-12-06

## 2021-10-15 RX ORDER — METOCLOPRAMIDE 10 MG/1
10 TABLET ORAL 2 TIMES DAILY
Qty: 60 TABLET | Refills: 1 | Status: SHIPPED | OUTPATIENT
Start: 2021-10-15 | End: 2021-12-06

## 2021-10-15 RX ORDER — PANTOPRAZOLE SODIUM 40 MG/1
40 TABLET, DELAYED RELEASE ORAL 2 TIMES DAILY
Qty: 180 TABLET | Refills: 0 | Status: SHIPPED | OUTPATIENT
Start: 2021-10-15 | End: 2022-02-03 | Stop reason: SDUPTHER

## 2021-10-15 RX ORDER — POLYETHYLENE GLYCOL 3350 17 G/17G
17 POWDER, FOR SOLUTION ORAL DAILY
Qty: 30 EACH | Refills: 1 | Status: SHIPPED | OUTPATIENT
Start: 2021-10-15

## 2021-11-16 DIAGNOSIS — F41.9 ANXIETY: ICD-10-CM

## 2021-11-16 RX ORDER — HYDROXYZINE PAMOATE 25 MG/1
CAPSULE ORAL
Qty: 30 CAPSULE | Refills: 0 | Status: SHIPPED | OUTPATIENT
Start: 2021-11-16 | End: 2021-12-06

## 2021-12-04 DIAGNOSIS — R10.9 ABDOMINAL PAIN: ICD-10-CM

## 2021-12-04 DIAGNOSIS — F41.9 ANXIETY: ICD-10-CM

## 2021-12-04 DIAGNOSIS — R11.10 VOMITING: ICD-10-CM

## 2021-12-04 DIAGNOSIS — R11.2 NAUSEA & VOMITING: ICD-10-CM

## 2021-12-04 DIAGNOSIS — K59.03 DRUG-INDUCED CONSTIPATION: ICD-10-CM

## 2021-12-06 RX ORDER — HYDROXYZINE PAMOATE 25 MG/1
CAPSULE ORAL
Qty: 30 CAPSULE | Refills: 0 | Status: SHIPPED | OUTPATIENT
Start: 2021-12-06 | End: 2022-02-03 | Stop reason: SDUPTHER

## 2021-12-06 RX ORDER — DICYCLOMINE HCL 20 MG
TABLET ORAL
Qty: 30 TABLET | Refills: 1 | Status: SHIPPED | OUTPATIENT
Start: 2021-12-06 | End: 2022-02-03 | Stop reason: SDUPTHER

## 2021-12-06 RX ORDER — METOCLOPRAMIDE 10 MG/1
TABLET ORAL
Qty: 60 TABLET | Refills: 1 | Status: SHIPPED | OUTPATIENT
Start: 2021-12-06 | End: 2022-02-03 | Stop reason: SDUPTHER

## 2022-02-03 ENCOUNTER — HOSPITAL ENCOUNTER (EMERGENCY)
Facility: HOSPITAL | Age: 37
Discharge: HOME | End: 2022-02-03
Attending: EMERGENCY MEDICINE | Admitting: EMERGENCY MEDICINE
Payer: COMMERCIAL

## 2022-02-03 VITALS
RESPIRATION RATE: 18 BRPM | SYSTOLIC BLOOD PRESSURE: 139 MMHG | WEIGHT: 225 LBS | OXYGEN SATURATION: 96 % | HEART RATE: 98 BPM | BODY MASS INDEX: 30.52 KG/M2 | DIASTOLIC BLOOD PRESSURE: 94 MMHG | TEMPERATURE: 97.5 F

## 2022-02-03 DIAGNOSIS — K59.03 DRUG-INDUCED CONSTIPATION: ICD-10-CM

## 2022-02-03 DIAGNOSIS — R10.11 RUQ PAIN: ICD-10-CM

## 2022-02-03 DIAGNOSIS — N52.9 ERECTILE DYSFUNCTION, UNSPECIFIED ERECTILE DYSFUNCTION TYPE: ICD-10-CM

## 2022-02-03 DIAGNOSIS — R11.2 NAUSEA & VOMITING: ICD-10-CM

## 2022-02-03 DIAGNOSIS — F41.9 ANXIETY: ICD-10-CM

## 2022-02-03 DIAGNOSIS — R11.10 VOMITING: ICD-10-CM

## 2022-02-03 DIAGNOSIS — K21.9 GASTROESOPHAGEAL REFLUX DISEASE, UNSPECIFIED WHETHER ESOPHAGITIS PRESENT: ICD-10-CM

## 2022-02-03 DIAGNOSIS — Z76.0 ENCOUNTER FOR MEDICATION REFILL: Primary | ICD-10-CM

## 2022-02-03 DIAGNOSIS — R10.9 ABDOMINAL PAIN: ICD-10-CM

## 2022-02-03 PROCEDURE — 99282 EMERGENCY DEPT VISIT SF MDM: CPT | Performed by: PHYSICIAN ASSISTANT

## 2022-02-03 PROCEDURE — 99282 EMERGENCY DEPT VISIT SF MDM: CPT

## 2022-02-03 RX ORDER — HYDROXYZINE PAMOATE 25 MG/1
CAPSULE ORAL
Qty: 30 CAPSULE | Refills: 0 | Status: SHIPPED | OUTPATIENT
Start: 2022-02-03 | End: 2022-04-20 | Stop reason: SDUPTHER

## 2022-02-03 RX ORDER — SILDENAFIL 50 MG/1
50 TABLET, FILM COATED ORAL DAILY PRN
Qty: 10 TABLET | Refills: 2 | Status: SHIPPED | OUTPATIENT
Start: 2022-02-03 | End: 2022-04-20 | Stop reason: SDUPTHER

## 2022-02-03 RX ORDER — METOCLOPRAMIDE 10 MG/1
10 TABLET ORAL 2 TIMES DAILY
Qty: 60 TABLET | Refills: 1 | Status: SHIPPED | OUTPATIENT
Start: 2022-02-03

## 2022-02-03 RX ORDER — PANTOPRAZOLE SODIUM 40 MG/1
40 TABLET, DELAYED RELEASE ORAL 2 TIMES DAILY
Qty: 180 TABLET | Refills: 0 | Status: SHIPPED | OUTPATIENT
Start: 2022-02-03 | End: 2022-04-20 | Stop reason: SDUPTHER

## 2022-02-03 RX ORDER — DICYCLOMINE HCL 20 MG
20 TABLET ORAL 2 TIMES DAILY
Qty: 30 TABLET | Refills: 1 | Status: SHIPPED | OUTPATIENT
Start: 2022-02-03

## 2022-02-03 NOTE — PROGRESS NOTES
I saw Sunita Speaks in the ED at the request of his ED provider  He is here for medication refills after [facility]  I have refilled the medications a is requesting that we have on file already  I will have office reach out to schedule an appoint within 1 week to look into prescribing other medications are added in penitentiary including lisinopril, clonidine and Remeron  Patient does not know the doses of these new medications that were started penitentiary  I informed him to get his medical records are present recall the to find out the doses prior to his appointment [facility]

## 2022-02-03 NOTE — ED PROVIDER NOTES
Discussed potential cataract surgery in the near future. History  Chief Complaint   Patient presents with    Medication Refill     States he was released from [facility] yesterday and was told " to come here to get prescriptions for my meds "  States he was on Bentyl, Reglan, Protonix, Vistaril, Clonidine, Lisinopril and Remeron, he does not know doses  42-year-old male presenting today requesting medication refill  States he was released from [facility] yesterday and was informed to come here for medication refills  Patient states he was placed on multiple new medications while in [facility] including Remeron, lisinopril however does not have the dosages  He is also requesting his GI medications  Patient goes to [facility]  Has not attempted to call the office  He is otherwise feeling well, has not missed any of his medications  Prior to Admission Medications   Prescriptions Last Dose Informant Patient Reported? Taking?    albuterol (PROVENTIL HFA,VENTOLIN HFA) 90 mcg/act inhaler   No No   Sig: Inhale 2 puffs every 6 (six) hours as needed for wheezing   bisacodyl (DULCOLAX) 10 mg suppository   No No   Sig: Insert 1 suppository (10 mg total) into the rectum daily   dicyclomine (BENTYL) 20 mg tablet   No No   Sig: Take 1 tablet (20 mg total) by mouth 2 (two) times a day   hydrOXYzine pamoate (VISTARIL) 25 mg capsule   No No   Sig: Take 1 capsule by mouth three times a day if needed FOR ANXIETY   ibuprofen (MOTRIN) 600 mg tablet   No No   Sig: take 1 tablet by mouth every 6 hours   metoclopramide (REGLAN) 10 mg tablet   No No   Sig: Take 1 tablet (10 mg total) by mouth 2 (two) times a day   nicotine (RA Nicotine) 21 mg/24 hr TD 24 hr patch   No No   Sig: Place 1 patch on the skin every 24 hours   pantoprazole (PROTONIX) 40 mg tablet   No No   Sig: Take 1 tablet (40 mg total) by mouth 2 (two) times a day   polyethylene glycol (MIRALAX) 17 g packet   No No   Sig: Take 17 g by mouth daily   sildenafil (VIAGRA) 50 MG tablet   No No   Sig: Take 1 tablet (50 mg total) by mouth daily as needed for erectile dysfunction      Facility-Administered Medications: None       Past Medical History:   Diagnosis Date    Abscess of right hand 12/11/2020    ADHD (attention deficit hyperactivity disorder)     Hypertension     Inguinal hernia     Laceration of right hand without foreign body 12/18/2020    [facility]adone maintenance therapy patient ([facility] )     Opiate addiction ([facility] )     Psychiatric disorder     anxiety, depression, social personality diorder    Renal calculus, bilateral 3/7/2017       History reviewed  No pertinent surgical history  Family History   Problem Relation Age of Onset    Alcohol abuse Mother     Substance Abuse Mother     Hypertension Mother     Substance Abuse Father     Alcohol abuse Father     Heart disease Father     Arthritis Father     Mental illness Brother     Dementia Maternal Grandfather     Heart disease Maternal Grandfather     Diabetes Maternal Grandfather     Arthritis Maternal Grandfather      I have reviewed and agree with the history as documented  E-Cigarette/Vaping    E-Cigarette Use Never User      E-Cigarette/Vaping Substances    Nicotine No     THC No     CBD No     Flavoring No     Other No     Unknown No      Social History     Tobacco Use    Smoking status: Current Every Day Smoker     Packs/day: 1 00     Types: Cigarettes    Smokeless tobacco: Never Used   Vaping Use    Vaping Use: Never used   Substance Use Topics    Alcohol use: Yes     Comment: soc    Drug use: Not Currently     Types: Marijuana       Review of Systems   Constitutional: Negative  Negative for chills, fatigue and fever  HENT: Negative  Negative for congestion, postnasal drip, rhinorrhea and sore throat  Eyes: Negative  Respiratory: Negative  Negative for cough, shortness of breath and wheezing  Cardiovascular: Negative  Gastrointestinal: Negative  Negative for abdominal pain, diarrhea, nausea and vomiting     Endocrine: Negative  Genitourinary: Negative  Musculoskeletal: Negative  Skin: Negative  Neurological: Negative  Hematological: Negative  Psychiatric/Behavioral: Negative  All other systems reviewed and are negative  Physical Exam  Physical Exam  Vitals and nursing note reviewed  Constitutional:       Appearance: Normal appearance  HENT:      Head: Normocephalic and atraumatic  Right Ear: External ear normal       Left Ear: External ear normal       Nose: Nose normal    Eyes:      Conjunctiva/sclera: Conjunctivae normal    Cardiovascular:      Rate and Rhythm: Normal rate  Pulmonary:      Effort: Pulmonary effort is normal       Comments: S PO2 is 96% indicating adequate oxygenation  Abdominal:      General: There is no distension  Musculoskeletal:         General: No deformity  Normal range of motion  Cervical back: Normal range of motion  Skin:     General: Skin is dry  Findings: No rash  Neurological:      General: No focal deficit present  Mental Status: He is alert and oriented to person, place, and time  Mental status is at baseline  Psychiatric:         Mood and Affect: Mood normal          Behavior: Behavior normal          Thought Content:  Thought content normal          Judgment: Judgment normal          Vital Signs  ED Triage Vitals [02/03/22 1220]   Temperature Pulse Respirations Blood Pressure SpO2   97 5 °F (36 4 °C) 98 18 139/94 96 %      Temp Source Heart Rate Source Patient Position - Orthostatic VS BP Location FiO2 (%)   Tympanic Monitor Sitting Left arm --      Pain Score       --           Vitals:    02/03/22 1220   BP: 139/94   Pulse: 98   Patient Position - Orthostatic VS: Sitting         Visual Acuity      ED Medications  Medications - No data to display    Diagnostic Studies  Results Reviewed     None                 No orders to display              Procedures  Procedures         ED Course  ED Course as of 02/03/22 1357   Thu Feb 03, 2022 Briseida Galvez 84 resident at bedside                               SBIRT 20yo+      Most Recent Value   SBIRT (22 yo +)    In order to provide better care to our patients, we are screening all of our patients for alcohol and drug use  Would it be okay to ask you these screening questions? No Filed at: 02/03/2022 1250                    [facility]  Number of Diagnoses or Management Options  Encounter for medication refill  Diagnosis management comments: I reached out to [facility] and resident Dr Chanel Mahajan came to see patient to prescribe chronic meds and gave f/u in the outpatient office  Patient is informed to return to the emergency department for worsening of symptoms and was given proper education regarding their diagnosis and symptoms  Otherwise the patient is informed to follow up with their primary care doctor for re-evaluation  The patient verbalizes understanding and agrees with above assessment and plan  All questions were answered  Please Note: Fluency Direct voice recognition software may have been used in the creation of this document  Wrong words or sound a like substitutions may have occurred due to the inherent limitations of the voice software  Amount and/or Complexity of Data Reviewed  Review and summarize past medical records: yes  Independent visualization of images, tracings, or specimens: yes        Disposition  Final diagnoses:   Encounter for medication refill     Time reflects when diagnosis was documented in both MDM as applicable and the Disposition within this note     Time User Action Codes Description Comment    2/3/2022  1:08 PM Kandi Lockett Add [Z76 0] Encounter for medication refill       ED Disposition     ED Disposition Condition Date/Time Comment    Discharge Stable Thu Feb 3, 2022  1:08 PM Hank Tello discharge to home/self care              Follow-up Information     Follow up With Specialties Details Why Contact Info Additional Information [address] [facility] Emergency Department Emergency Medicine Go to  If symptoms worsen [address] [address]  [address] [address] Emergency Department, Ivan Sol[place], [address] [facility], [address]    [facility] Family Medicine Go to  your appointment [address] [address]  [phone]             Discharge Medication List as of 2/3/2022  1:15 PM      CONTINUE these medications which have NOT CHANGED    Details   albuterol (PROVENTIL HFA,VENTOLIN HFA) 90 mcg/act inhaler Inhale 2 puffs every 6 (six) hours as needed for wheezing, Starting Fri 10/15/2021, Normal      bisacodyl (DULCOLAX) 10 mg suppository Insert 1 suppository (10 mg total) into the rectum daily, Starting Wed 10/6/2021, Normal      ibuprofen (MOTRIN) 600 mg tablet take 1 tablet by mouth every 6 hours, Normal      nicotine (RA Nicotine) 21 mg/24 hr TD 24 hr patch Place 1 patch on the skin every 24 hours, Starting Wed 10/6/2021, Normal      polyethylene glycol (MIRALAX) 17 g packet Take 17 g by mouth daily, Starting Fri 10/15/2021, Normal      dicyclomine (BENTYL) 20 mg tablet take 1 tablet by mouth twice a day, Normal      hydrOXYzine pamoate (VISTARIL) 25 mg capsule take 1 capsule by mouth three times a day if needed  FOR ANXIETY, Normal      metoclopramide (REGLAN) 10 mg tablet take 1 tablet by mouth twice a day, Normal      pantoprazole (PROTONIX) 40 mg tablet Take 1 tablet (40 mg total) by mouth 2 (two) times a day, Starting Fri 10/15/2021, Until Thu 1/13/2022, Normal      sildenafil (VIAGRA) 50 MG tablet Take 1 tablet (50 mg total) by mouth daily as needed for erectile dysfunction, Starting Tue 12/15/2020, Normal             No discharge procedures on file      PDMP Review       Value Time User    PDMP Reviewed  Yes 4/7/2021  6:30 PM Jeanie Nunes MD          ED Provider  Electronically Signed by           Kirstin Bean PA-C  02/03/22 3073

## 2022-02-09 ENCOUNTER — OFFICE VISIT (OUTPATIENT)
Dept: FAMILY MEDICINE CLINIC | Facility: CLINIC | Age: 37
End: 2022-02-09
Payer: COMMERCIAL

## 2022-02-09 VITALS
HEART RATE: 96 BPM | WEIGHT: 234.2 LBS | DIASTOLIC BLOOD PRESSURE: 90 MMHG | TEMPERATURE: 97.6 F | RESPIRATION RATE: 18 BRPM | SYSTOLIC BLOOD PRESSURE: 136 MMHG | BODY MASS INDEX: 31.72 KG/M2 | OXYGEN SATURATION: 97 % | HEIGHT: 72 IN

## 2022-02-09 DIAGNOSIS — F17.200 CURRENT EVERY DAY SMOKER: ICD-10-CM

## 2022-02-09 DIAGNOSIS — Z71.6 ENCOUNTER FOR SMOKING CESSATION COUNSELING: ICD-10-CM

## 2022-02-09 DIAGNOSIS — I10 ESSENTIAL HYPERTENSION: Primary | ICD-10-CM

## 2022-02-09 DIAGNOSIS — F32.A DEPRESSION, UNSPECIFIED DEPRESSION TYPE: ICD-10-CM

## 2022-02-09 DIAGNOSIS — Z23 ENCOUNTER FOR IMMUNIZATION: ICD-10-CM

## 2022-02-09 PROCEDURE — 4004F PT TOBACCO SCREEN RCVD TLK: CPT | Performed by: FAMILY MEDICINE

## 2022-02-09 PROCEDURE — 3725F SCREEN DEPRESSION PERFORMED: CPT | Performed by: FAMILY MEDICINE

## 2022-02-09 PROCEDURE — 3008F BODY MASS INDEX DOCD: CPT | Performed by: FAMILY MEDICINE

## 2022-02-09 PROCEDURE — 90471 IMMUNIZATION ADMIN: CPT

## 2022-02-09 PROCEDURE — 99213 OFFICE O/P EST LOW 20 MIN: CPT | Performed by: FAMILY MEDICINE

## 2022-02-09 PROCEDURE — 90715 TDAP VACCINE 7 YRS/> IM: CPT

## 2022-02-09 RX ORDER — NICOTINE 21 MG/24HR
1 PATCH, TRANSDERMAL 24 HOURS TRANSDERMAL EVERY 24 HOURS
Qty: 28 PATCH | Refills: 0 | Status: SHIPPED | OUTPATIENT
Start: 2022-02-09 | End: 2022-05-17 | Stop reason: SDUPTHER

## 2022-02-09 RX ORDER — LISINOPRIL 10 MG/1
10 TABLET ORAL DAILY
Qty: 30 TABLET | Refills: 1 | Status: SHIPPED | OUTPATIENT
Start: 2022-02-09 | End: 2022-04-20 | Stop reason: SDUPTHER

## 2022-02-09 RX ORDER — MIRTAZAPINE 15 MG/1
15 TABLET, FILM COATED ORAL
Qty: 30 TABLET | Refills: 1 | Status: SHIPPED | OUTPATIENT
Start: 2022-02-09 | End: 2022-04-20 | Stop reason: SDUPTHER

## 2022-02-09 NOTE — PROGRESS NOTES
Assessment/Plan:     Diagnoses and all orders for this visit:    Essential hypertension  -     lisinopril (ZESTRIL) 10 mg tablet; Take 1 tablet (10 mg total) by mouth daily    Encounter for immunization  -     TDAP VACCINE GREATER THAN OR EQUAL TO 6YO IM    Current every day smoker  -     nicotine (RA Nicotine) 21 mg/24 hr TD 24 hr patch; Place 1 patch on the skin every 24 hours    Depression, unspecified depression type  -     mirtazapine (REMERON) 15 mg tablet; Take 1 tablet (15 mg total) by mouth daily at bedtime    Encounter for smoking cessation counseling            Depression Screening and Follow-up Plan: Patient was screened for depression during today's encounter  They screened negative with a PHQ-2 score of 0  Tobacco Cessation Counseling: Tobacco cessation counseling was provided  The patient is sincerely urged to quit consumption of tobacco  He is ready to quit tobacco  Medication options discussed  Patient refused medication  Nicotine patch was prescribed  Discussed with patients  the benefits, contraindications and side effects of the following vaccines: Tetanus, Diphtheria or Pertussis   Discussed 3 components of the vaccine/s  Subjective:      Patient ID: Mary Doyle is a 29 y o  male smoker with HTN, depression, and anxiety presenting today for medication management after being released from [facility] last week       HTN  - current regimen: lisinopril and clonidine for 2 months while he was in [facility]   - adherence: has not been able to take his meds since he was released on Wednesday   - adverse effects: N  - blood pressure ranges: 130s/90s, at one time DBP was 110   - headache: left sided headache behind th eye when BP is high   - blurry vision: N   - focal neurological deficits: N  - sleep apnea symptoms: N    Depression & Anxiety:   current regimen: Remeron & Vistaril; Remeron was started a month ago   - responding well to Remeron   - less depressed  - less anhedonia  - no suicidal ideation  - counseling: N  Family history mental illness: Mom has anxiety & depression   Past pharmacotherapy: Zoloft, Seroquel, Trazodone, Wellbutrin, Buspar   Past psychiatric hospitalization: N    Smoking:   - quantity: half a ppd currently; has been smoking 1 ppd for 15 years   - willingness to quit: Y  Patient was started on Nicoderm patch, but patient is currently not using and is smoking     - Patient is willing to retry nicotine replacement therapy      The following portions of the patient's history were reviewed and updated as appropriate: allergies, current medications, past family history, past medical history, past social history, past surgical history and problem list     Review of Systems   All other systems reviewed and are negative  Objective:      /90   Pulse 96   Temp 97 6 °F (36 4 °C) (Tympanic)   Resp 18   Ht 6' (1 829 m)   Wt 106 kg (234 lb 3 2 oz)   SpO2 97%   BMI 31 76 kg/m²          Physical Exam  Vitals and nursing note reviewed  Constitutional:       General: He is not in acute distress  Appearance: He is obese  He is not diaphoretic  HENT:      Head: Normocephalic  Eyes:      Conjunctiva/sclera: Conjunctivae normal    Cardiovascular:      Rate and Rhythm: Normal rate  Pulmonary:      Effort: Pulmonary effort is normal  No respiratory distress  Neurological:      Mental Status: He is alert and oriented to person, place, and time  Gait: Gait normal    Psychiatric:         Attention and Perception: Attention normal          Mood and Affect: Mood normal  Affect is blunt  Speech: Speech normal          Thought Content: Thought content normal  Thought content does not include suicidal ideation

## 2022-04-20 DIAGNOSIS — R10.11 RUQ PAIN: ICD-10-CM

## 2022-04-20 DIAGNOSIS — K21.9 GASTROESOPHAGEAL REFLUX DISEASE, UNSPECIFIED WHETHER ESOPHAGITIS PRESENT: ICD-10-CM

## 2022-04-20 DIAGNOSIS — I10 ESSENTIAL HYPERTENSION: ICD-10-CM

## 2022-04-20 DIAGNOSIS — F32.A DEPRESSION, UNSPECIFIED DEPRESSION TYPE: ICD-10-CM

## 2022-04-20 DIAGNOSIS — F41.9 ANXIETY: ICD-10-CM

## 2022-04-20 DIAGNOSIS — N52.9 ERECTILE DYSFUNCTION, UNSPECIFIED ERECTILE DYSFUNCTION TYPE: ICD-10-CM

## 2022-04-20 RX ORDER — SILDENAFIL 50 MG/1
50 TABLET, FILM COATED ORAL DAILY PRN
Qty: 10 TABLET | Refills: 2 | Status: SHIPPED | OUTPATIENT
Start: 2022-04-20 | End: 2022-05-02

## 2022-04-20 RX ORDER — LISINOPRIL 10 MG/1
10 TABLET ORAL DAILY
Qty: 30 TABLET | Refills: 1 | Status: SHIPPED | OUTPATIENT
Start: 2022-04-20 | End: 2022-05-17 | Stop reason: SDUPTHER

## 2022-04-20 RX ORDER — HYDROXYZINE PAMOATE 25 MG/1
CAPSULE ORAL
Qty: 30 CAPSULE | Refills: 0 | Status: SHIPPED | OUTPATIENT
Start: 2022-04-20 | End: 2022-04-23

## 2022-04-20 RX ORDER — MIRTAZAPINE 15 MG/1
15 TABLET, FILM COATED ORAL
Qty: 30 TABLET | Refills: 1 | Status: SHIPPED | OUTPATIENT
Start: 2022-04-20 | End: 2022-05-17 | Stop reason: SDUPTHER

## 2022-04-20 RX ORDER — PANTOPRAZOLE SODIUM 40 MG/1
40 TABLET, DELAYED RELEASE ORAL 2 TIMES DAILY
Qty: 180 TABLET | Refills: 0 | Status: SHIPPED | OUTPATIENT
Start: 2022-04-20 | End: 2022-05-17 | Stop reason: SDUPTHER

## 2022-04-22 ENCOUNTER — TELEPHONE (OUTPATIENT)
Dept: FAMILY MEDICINE CLINIC | Facility: CLINIC | Age: 37
End: 2022-04-22

## 2022-04-22 DIAGNOSIS — F41.9 ANXIETY: ICD-10-CM

## 2022-04-22 NOTE — TELEPHONE ENCOUNTER
Patient called and stated that when he picked up his visteril it should be 90 pills, but was only given 30 pills  Also the viagra should be 30 pills he only received 10  Please clarify

## 2022-04-23 RX ORDER — HYDROXYZINE PAMOATE 25 MG/1
CAPSULE ORAL
Qty: 30 CAPSULE | Refills: 0 | Status: SHIPPED | OUTPATIENT
Start: 2022-04-23 | End: 2022-04-25 | Stop reason: SDUPTHER

## 2022-04-25 NOTE — TELEPHONE ENCOUNTER
Changed the vistaril  In regards to the viagra, we typically only prescribe 10 at a time  He was prescribed 10 with 2 refills (adding up to 30)

## 2022-04-26 DIAGNOSIS — F41.9 ANXIETY: ICD-10-CM

## 2022-04-26 RX ORDER — HYDROXYZINE PAMOATE 25 MG/1
CAPSULE ORAL
Qty: 90 CAPSULE | Refills: 1 | Status: SHIPPED | OUTPATIENT
Start: 2022-04-26 | End: 2022-05-17 | Stop reason: SDUPTHER

## 2022-05-02 DIAGNOSIS — N52.9 ERECTILE DYSFUNCTION, UNSPECIFIED ERECTILE DYSFUNCTION TYPE: ICD-10-CM

## 2022-05-02 RX ORDER — SILDENAFIL 50 MG/1
TABLET, FILM COATED ORAL
Qty: 10 TABLET | Refills: 2 | Status: SHIPPED | OUTPATIENT
Start: 2022-05-02 | End: 2022-05-17 | Stop reason: SDUPTHER

## 2022-05-17 DIAGNOSIS — F32.A DEPRESSION, UNSPECIFIED DEPRESSION TYPE: ICD-10-CM

## 2022-05-17 DIAGNOSIS — K21.9 GASTROESOPHAGEAL REFLUX DISEASE, UNSPECIFIED WHETHER ESOPHAGITIS PRESENT: ICD-10-CM

## 2022-05-17 DIAGNOSIS — F41.9 ANXIETY: ICD-10-CM

## 2022-05-17 DIAGNOSIS — I10 ESSENTIAL HYPERTENSION: ICD-10-CM

## 2022-05-17 DIAGNOSIS — N52.9 ERECTILE DYSFUNCTION, UNSPECIFIED ERECTILE DYSFUNCTION TYPE: ICD-10-CM

## 2022-05-17 DIAGNOSIS — R10.11 RUQ PAIN: ICD-10-CM

## 2022-05-17 DIAGNOSIS — F17.200 CURRENT EVERY DAY SMOKER: ICD-10-CM

## 2022-05-17 RX ORDER — PANTOPRAZOLE SODIUM 40 MG/1
40 TABLET, DELAYED RELEASE ORAL 2 TIMES DAILY
Qty: 180 TABLET | Refills: 0 | Status: SHIPPED | OUTPATIENT
Start: 2022-05-17 | End: 2022-06-20 | Stop reason: SDUPTHER

## 2022-05-17 RX ORDER — LISINOPRIL 10 MG/1
10 TABLET ORAL DAILY
Qty: 30 TABLET | Refills: 0 | Status: SHIPPED | OUTPATIENT
Start: 2022-05-17 | End: 2022-06-16

## 2022-05-17 RX ORDER — HYDROXYZINE PAMOATE 25 MG/1
CAPSULE ORAL
Qty: 90 CAPSULE | Refills: 0 | Status: SHIPPED | OUTPATIENT
Start: 2022-05-17 | End: 2022-06-20 | Stop reason: SDUPTHER

## 2022-05-17 RX ORDER — NICOTINE 21 MG/24HR
1 PATCH, TRANSDERMAL 24 HOURS TRANSDERMAL EVERY 24 HOURS
Qty: 28 PATCH | Refills: 0 | Status: SHIPPED | OUTPATIENT
Start: 2022-05-17 | End: 2022-06-20 | Stop reason: SDUPTHER

## 2022-05-17 RX ORDER — SILDENAFIL 50 MG/1
50 TABLET, FILM COATED ORAL DAILY
Qty: 10 TABLET | Refills: 0 | Status: SHIPPED | OUTPATIENT
Start: 2022-05-17 | End: 2022-06-01

## 2022-05-17 RX ORDER — MIRTAZAPINE 15 MG/1
15 TABLET, FILM COATED ORAL
Qty: 30 TABLET | Refills: 0 | Status: SHIPPED | OUTPATIENT
Start: 2022-05-17 | End: 2022-06-16

## 2022-06-01 DIAGNOSIS — N52.9 ERECTILE DYSFUNCTION, UNSPECIFIED ERECTILE DYSFUNCTION TYPE: ICD-10-CM

## 2022-06-01 RX ORDER — SILDENAFIL 50 MG/1
50 TABLET, FILM COATED ORAL DAILY
Qty: 10 TABLET | Refills: 0 | Status: SHIPPED | OUTPATIENT
Start: 2022-06-01 | End: 2022-06-13

## 2022-06-08 DIAGNOSIS — F32.A DEPRESSION, UNSPECIFIED DEPRESSION TYPE: ICD-10-CM

## 2022-06-08 DIAGNOSIS — I10 ESSENTIAL HYPERTENSION: ICD-10-CM

## 2022-06-08 NOTE — TELEPHONE ENCOUNTER
DR Sameer Keller : pls send refill if on pend med if possible since you never add refill on it at the last visit so I am not sure about   Thanks

## 2022-06-09 RX ORDER — MIRTAZAPINE 15 MG/1
15 TABLET, FILM COATED ORAL
Qty: 90 TABLET | Refills: 3 | OUTPATIENT
Start: 2022-06-09 | End: 2022-09-07

## 2022-06-09 RX ORDER — LISINOPRIL 10 MG/1
10 TABLET ORAL DAILY
Qty: 90 TABLET | Refills: 3 | OUTPATIENT
Start: 2022-06-09 | End: 2022-09-07

## 2022-06-13 DIAGNOSIS — N52.9 ERECTILE DYSFUNCTION, UNSPECIFIED ERECTILE DYSFUNCTION TYPE: ICD-10-CM

## 2022-06-13 RX ORDER — SILDENAFIL 50 MG/1
50 TABLET, FILM COATED ORAL DAILY
Qty: 10 TABLET | Refills: 0 | Status: SHIPPED | OUTPATIENT
Start: 2022-06-13 | End: 2022-06-20 | Stop reason: SDUPTHER

## 2022-06-16 DIAGNOSIS — F32.A DEPRESSION, UNSPECIFIED DEPRESSION TYPE: ICD-10-CM

## 2022-06-16 DIAGNOSIS — I10 ESSENTIAL HYPERTENSION: ICD-10-CM

## 2022-06-16 RX ORDER — MIRTAZAPINE 15 MG/1
15 TABLET, FILM COATED ORAL
Qty: 30 TABLET | Refills: 0 | Status: SHIPPED | OUTPATIENT
Start: 2022-06-16 | End: 2022-07-07

## 2022-06-16 RX ORDER — LISINOPRIL 10 MG/1
10 TABLET ORAL DAILY
Qty: 30 TABLET | Refills: 0 | Status: SHIPPED | OUTPATIENT
Start: 2022-06-16 | End: 2022-06-20 | Stop reason: SDUPTHER

## 2022-06-17 NOTE — TELEPHONE ENCOUNTER
Called patient mobile number on file is out of service and home phone is now a wrong number as it is a new person's celluar number  Taking home phone number out of chart

## 2022-06-27 DIAGNOSIS — N52.9 ERECTILE DYSFUNCTION, UNSPECIFIED ERECTILE DYSFUNCTION TYPE: ICD-10-CM

## 2022-06-27 RX ORDER — SILDENAFIL 50 MG/1
50 TABLET, FILM COATED ORAL DAILY
Qty: 10 TABLET | Refills: 0 | Status: SHIPPED | OUTPATIENT
Start: 2022-06-27 | End: 2022-07-06 | Stop reason: SDUPTHER

## 2022-07-06 DIAGNOSIS — N52.9 ERECTILE DYSFUNCTION, UNSPECIFIED ERECTILE DYSFUNCTION TYPE: ICD-10-CM

## 2022-07-07 DIAGNOSIS — F32.A DEPRESSION, UNSPECIFIED DEPRESSION TYPE: ICD-10-CM

## 2022-07-07 RX ORDER — MIRTAZAPINE 15 MG/1
15 TABLET, FILM COATED ORAL
Qty: 30 TABLET | Refills: 0 | Status: SHIPPED | OUTPATIENT
Start: 2022-07-07 | End: 2022-08-03 | Stop reason: SDUPTHER

## 2022-07-07 RX ORDER — SILDENAFIL 50 MG/1
50 TABLET, FILM COATED ORAL DAILY
Qty: 10 TABLET | Refills: 0 | Status: SHIPPED | OUTPATIENT
Start: 2022-07-07 | End: 2022-07-14

## 2022-07-14 DIAGNOSIS — N52.9 ERECTILE DYSFUNCTION, UNSPECIFIED ERECTILE DYSFUNCTION TYPE: ICD-10-CM

## 2022-07-14 RX ORDER — SILDENAFIL 50 MG/1
50 TABLET, FILM COATED ORAL DAILY
Qty: 10 TABLET | Refills: 0 | Status: SHIPPED | OUTPATIENT
Start: 2022-07-14 | End: 2022-07-21 | Stop reason: SDUPTHER

## 2022-07-21 ENCOUNTER — TELEPHONE (OUTPATIENT)
Dept: FAMILY MEDICINE CLINIC | Facility: CLINIC | Age: 37
End: 2022-07-21

## 2022-07-21 DIAGNOSIS — N52.9 ERECTILE DYSFUNCTION, UNSPECIFIED ERECTILE DYSFUNCTION TYPE: ICD-10-CM

## 2022-07-21 DIAGNOSIS — F17.200 CURRENT EVERY DAY SMOKER: ICD-10-CM

## 2022-07-21 RX ORDER — NICOTINE 21 MG/24HR
1 PATCH, TRANSDERMAL 24 HOURS TRANSDERMAL EVERY 24 HOURS
Qty: 28 PATCH | Refills: 0 | Status: SHIPPED | OUTPATIENT
Start: 2022-07-21 | End: 2022-08-11 | Stop reason: SDUPTHER

## 2022-07-21 RX ORDER — SILDENAFIL 50 MG/1
50 TABLET, FILM COATED ORAL DAILY
Qty: 10 TABLET | Refills: 0 | Status: SHIPPED | OUTPATIENT
Start: 2022-07-21 | End: 2022-08-11 | Stop reason: SDUPTHER

## 2022-07-21 NOTE — TELEPHONE ENCOUNTER
I have yet to meet this patient in office  Patient has no showed to multiple visits  This will be my last refill for him if he does not schedule and appear to appointment  Thank you

## 2022-07-21 NOTE — TELEPHONE ENCOUNTER
Tried to reach patient first number is disconnected and the other two have changed to different people

## 2022-07-28 DIAGNOSIS — N52.9 ERECTILE DYSFUNCTION, UNSPECIFIED ERECTILE DYSFUNCTION TYPE: ICD-10-CM

## 2022-07-28 DIAGNOSIS — F41.9 ANXIETY: ICD-10-CM

## 2022-07-28 DIAGNOSIS — F17.200 CURRENT EVERY DAY SMOKER: ICD-10-CM

## 2022-07-28 RX ORDER — SILDENAFIL 50 MG/1
50 TABLET, FILM COATED ORAL DAILY
Qty: 10 TABLET | Refills: 0 | OUTPATIENT
Start: 2022-07-28

## 2022-07-28 RX ORDER — HYDROXYZINE PAMOATE 25 MG/1
CAPSULE ORAL
Qty: 90 CAPSULE | Refills: 0 | OUTPATIENT
Start: 2022-07-28

## 2022-07-28 RX ORDER — NICOTINE 21 MG/24HR
1 PATCH, TRANSDERMAL 24 HOURS TRANSDERMAL EVERY 24 HOURS
Qty: 28 PATCH | Refills: 0 | OUTPATIENT
Start: 2022-07-28

## 2022-08-03 DIAGNOSIS — I10 ESSENTIAL HYPERTENSION: ICD-10-CM

## 2022-08-03 DIAGNOSIS — F32.A DEPRESSION, UNSPECIFIED DEPRESSION TYPE: ICD-10-CM

## 2022-08-03 RX ORDER — MIRTAZAPINE 15 MG/1
15 TABLET, FILM COATED ORAL
Qty: 30 TABLET | Refills: 0 | Status: SHIPPED | OUTPATIENT
Start: 2022-08-03

## 2022-08-03 RX ORDER — LISINOPRIL 10 MG/1
10 TABLET ORAL DAILY
Qty: 30 TABLET | Refills: 0 | Status: SHIPPED | OUTPATIENT
Start: 2022-08-03

## 2022-08-08 DIAGNOSIS — R10.11 RUQ PAIN: ICD-10-CM

## 2022-08-08 DIAGNOSIS — K21.9 GASTROESOPHAGEAL REFLUX DISEASE, UNSPECIFIED WHETHER ESOPHAGITIS PRESENT: ICD-10-CM

## 2022-08-08 RX ORDER — PANTOPRAZOLE SODIUM 20 MG/1
20 TABLET, DELAYED RELEASE ORAL 2 TIMES DAILY
Qty: 180 TABLET | Refills: 0 | Status: SHIPPED | OUTPATIENT
Start: 2022-08-08 | End: 2022-11-06

## 2022-08-11 DIAGNOSIS — F41.9 ANXIETY: ICD-10-CM

## 2022-08-11 DIAGNOSIS — N52.9 ERECTILE DYSFUNCTION, UNSPECIFIED ERECTILE DYSFUNCTION TYPE: ICD-10-CM

## 2022-08-11 DIAGNOSIS — F17.200 CURRENT EVERY DAY SMOKER: ICD-10-CM

## 2022-08-11 RX ORDER — SILDENAFIL 50 MG/1
50 TABLET, FILM COATED ORAL DAILY
Qty: 10 TABLET | Refills: 0 | Status: SHIPPED | OUTPATIENT
Start: 2022-08-11 | End: 2022-09-07 | Stop reason: SDUPTHER

## 2022-08-11 RX ORDER — HYDROXYZINE PAMOATE 25 MG/1
CAPSULE ORAL
Qty: 90 CAPSULE | Refills: 0 | Status: SHIPPED | OUTPATIENT
Start: 2022-08-11 | End: 2022-09-07 | Stop reason: SDUPTHER

## 2022-08-11 RX ORDER — NICOTINE 21 MG/24HR
1 PATCH, TRANSDERMAL 24 HOURS TRANSDERMAL EVERY 24 HOURS
Qty: 28 PATCH | Refills: 0 | Status: SHIPPED | OUTPATIENT
Start: 2022-08-11

## 2022-08-23 DIAGNOSIS — N52.9 ERECTILE DYSFUNCTION, UNSPECIFIED ERECTILE DYSFUNCTION TYPE: ICD-10-CM

## 2022-08-24 RX ORDER — SILDENAFIL 50 MG/1
50 TABLET, FILM COATED ORAL DAILY
Qty: 10 TABLET | Refills: 0 | OUTPATIENT
Start: 2022-08-24

## 2022-08-24 NOTE — TELEPHONE ENCOUNTER
Please see Dr Andrew Mora message above and call patient to schedule appointment for medication refill to be given   Thanks

## 2022-08-24 NOTE — TELEPHONE ENCOUNTER
Fern Linda, this is something I need to discuss with patient and attending  Despite being listed as PCP, I have never met this patient in office (he has no showed to multiple appts in the past)

## 2022-09-07 DIAGNOSIS — N52.9 ERECTILE DYSFUNCTION, UNSPECIFIED ERECTILE DYSFUNCTION TYPE: ICD-10-CM

## 2022-09-07 DIAGNOSIS — F41.9 ANXIETY: ICD-10-CM

## 2022-09-07 RX ORDER — HYDROXYZINE PAMOATE 25 MG/1
CAPSULE ORAL
Qty: 90 CAPSULE | Refills: 0 | Status: SHIPPED | OUTPATIENT
Start: 2022-09-07

## 2022-09-07 RX ORDER — SILDENAFIL 50 MG/1
50 TABLET, FILM COATED ORAL DAILY
Qty: 10 TABLET | Refills: 0 | Status: SHIPPED | OUTPATIENT
Start: 2022-09-07 | End: 2022-09-19

## 2022-09-19 DIAGNOSIS — N52.9 ERECTILE DYSFUNCTION, UNSPECIFIED ERECTILE DYSFUNCTION TYPE: ICD-10-CM

## 2022-09-19 RX ORDER — SILDENAFIL 50 MG/1
50 TABLET, FILM COATED ORAL DAILY
Qty: 10 TABLET | Refills: 0 | Status: SHIPPED | OUTPATIENT
Start: 2022-09-19

## 2022-09-23 DIAGNOSIS — N52.9 ERECTILE DYSFUNCTION, UNSPECIFIED ERECTILE DYSFUNCTION TYPE: ICD-10-CM

## 2022-09-23 RX ORDER — SILDENAFIL 50 MG/1
50 TABLET, FILM COATED ORAL DAILY
Qty: 10 TABLET | Refills: 0 | OUTPATIENT
Start: 2022-09-23

## 2022-10-03 DIAGNOSIS — F41.9 ANXIETY: ICD-10-CM

## 2022-10-03 DIAGNOSIS — F17.200 CURRENT EVERY DAY SMOKER: ICD-10-CM

## 2022-10-03 DIAGNOSIS — N52.9 ERECTILE DYSFUNCTION, UNSPECIFIED ERECTILE DYSFUNCTION TYPE: ICD-10-CM

## 2022-10-04 RX ORDER — HYDROXYZINE PAMOATE 25 MG/1
CAPSULE ORAL
Qty: 90 CAPSULE | Refills: 0 | OUTPATIENT
Start: 2022-10-04

## 2022-10-04 RX ORDER — SILDENAFIL 50 MG/1
50 TABLET, FILM COATED ORAL DAILY
Qty: 10 TABLET | Refills: 0 | OUTPATIENT
Start: 2022-10-04

## 2022-10-04 RX ORDER — NICOTINE 21 MG/24HR
1 PATCH, TRANSDERMAL 24 HOURS TRANSDERMAL EVERY 24 HOURS
Qty: 28 PATCH | Refills: 0 | OUTPATIENT
Start: 2022-10-04

## 2022-10-04 NOTE — TELEPHONE ENCOUNTER
Per Wellington's last message to patient, patient will need appointment before any further refills are placed

## 2022-11-30 ENCOUNTER — OFFICE VISIT (OUTPATIENT)
Dept: FAMILY MEDICINE CLINIC | Facility: CLINIC | Age: 37
End: 2022-11-30

## 2022-11-30 VITALS
SYSTOLIC BLOOD PRESSURE: 131 MMHG | OXYGEN SATURATION: 98 % | HEART RATE: 70 BPM | RESPIRATION RATE: 18 BRPM | WEIGHT: 217.8 LBS | TEMPERATURE: 96.8 F | HEIGHT: 72 IN | DIASTOLIC BLOOD PRESSURE: 69 MMHG | BODY MASS INDEX: 29.5 KG/M2

## 2022-11-30 DIAGNOSIS — N52.9 ERECTILE DYSFUNCTION, UNSPECIFIED ERECTILE DYSFUNCTION TYPE: ICD-10-CM

## 2022-11-30 DIAGNOSIS — B35.1 NAIL FUNGUS: ICD-10-CM

## 2022-11-30 RX ORDER — SILDENAFIL 50 MG/1
50 TABLET, FILM COATED ORAL DAILY
Qty: 10 TABLET | Refills: 0 | Status: SHIPPED | OUTPATIENT
Start: 2022-11-30

## 2022-11-30 RX ORDER — PANTOPRAZOLE SODIUM 40 MG/1
TABLET, DELAYED RELEASE ORAL
COMMUNITY
Start: 2022-11-28

## 2022-11-30 RX ORDER — CLOTRIMAZOLE 1 %
CREAM (GRAM) TOPICAL 2 TIMES DAILY
Qty: 30 G | Refills: 0 | Status: SHIPPED | OUTPATIENT
Start: 2022-11-30

## 2022-11-30 NOTE — PROGRESS NOTES
Name: Yaquelin Dave      : 1985      MRN: [id number]  Encounter Provider: KEVIN Oneil  Encounter Date: 2022   Encounter department: [facility]     1  Nail fungus  -     clotrimazole (LOTRIMIN) 1 % cream; Apply topically 2 (two) times a day    2  Erectile dysfunction, unspecified erectile dysfunction type  -     sildenafil (VIAGRA) 50 MG tablet; Take 1 tablet (50 mg total) by mouth daily           Subjective      Patient complained right foot large toe tender  Fungus to all toenails of right foot noted  No redness or swelling of foot or toes noted  Patient has fungus of toe nails  Educated to clean feet daily and change socks  Told will order medication to put on his toe nail and he is to clean nails and then apply ointment 2 times a day  Educated him that the condition of his nails are not bad enough to warrant an oral antifungal   In addition upon assessing large to there is no redness, warmth or drainage indicating infection  Educated not to cut toe nails so short and not to pick at them  Review of Systems   Constitutional: Negative  HENT: Negative  Eyes: Negative  Respiratory: Negative  Cardiovascular: Negative  Gastrointestinal: Negative  Endocrine: Negative  Genitourinary: Negative  Musculoskeletal: Negative  Skin:        Right foot toe nail fungus all toes-nails both feet toes cut very short   Allergic/Immunologic: Negative  Neurological: Negative  Hematological: Negative  Psychiatric/Behavioral: Negative          Current Outpatient Medications on File Prior to Visit   Medication Sig   • albuterol (PROVENTIL HFA,VENTOLIN HFA) 90 mcg/act inhaler Inhale 2 puffs every 6 (six) hours as needed for wheezing   • dicyclomine (BENTYL) 20 mg tablet Take 1 tablet (20 mg total) by mouth 2 (two) times a day   • hydrOXYzine pamoate (VISTARIL) 25 mg capsule TAKE AS NEEDED FOR ANXIETY   • metoclopramide (REGLAN) 10 mg tablet Take 1 tablet (10 mg total) by mouth 2 (two) times a day   • nicotine (RA Nicotine) 21 mg/24 hr TD 24 hr patch Place 1 patch on the skin every 24 hours   • pantoprazole (PROTONIX) 40 mg tablet TAKE 1 TABLET (40 MG TOTAL) BY MOUTH IN THE MORNING AND 1 TABLET (40 MG TOTAL) BEFORE BEDTIME    • [DISCONTINUED] sildenafil (VIAGRA) 50 MG tablet TAKE 1 TABLET (50 MG TOTAL) BY MOUTH DAILY   • pantoprazole (PROTONIX) 20 mg tablet TAKE 1 TABLET (20 MG TOTAL) BY MOUTH 2 (TWO) TIMES A DAY   • [DISCONTINUED] lisinopril (ZESTRIL) 10 mg tablet Take 1 tablet (10 mg total) by mouth daily (Patient not taking: Reported on 11/30/2022)   • [DISCONTINUED] mirtazapine (REMERON) 15 mg tablet Take 1 tablet (15 mg total) by mouth daily at bedtime (Patient not taking: Reported on 11/30/2022)   • [DISCONTINUED] polyethylene glycol (MIRALAX) 17 g packet Take 17 g by mouth daily (Patient not taking: Reported on 11/30/2022)       Objective     /69 (BP Location: Left arm, Patient Position: Sitting, Cuff Size: Adult)   Pulse 70   Temp (!) 96 8 °F (36 °C) (Tympanic)   Resp 18   Ht 6' (1 829 m)   Wt 98 8 kg (217 lb 12 8 oz)   SpO2 98%   BMI 29 54 kg/m²     Physical Exam  Constitutional:       Appearance: Normal appearance  HENT:      Head: Normocephalic and atraumatic  Right Ear: Tympanic membrane normal       Left Ear: Tympanic membrane normal       Nose: Nose normal       Mouth/Throat:      Mouth: Mucous membranes are moist    Eyes:      Extraocular Movements: Extraocular movements intact  Cardiovascular:      Rate and Rhythm: Regular rhythm  Heart sounds: Normal heart sounds  Pulmonary:      Effort: Pulmonary effort is normal       Breath sounds: Normal breath sounds  Abdominal:      General: Abdomen is flat  Palpations: Abdomen is soft  Musculoskeletal:         General: Normal range of motion  Cervical back: Normal range of motion and neck supple  Right lower leg: No edema        Left lower leg: No edema  Skin:     General: Skin is warm and dry  Comments: Fungus on toenails right foot   Neurological:      General: No focal deficit present  Mental Status: He is alert and oriented to person, place, and time  Psychiatric:         Mood and Affect: Mood normal          Behavior: Behavior normal          Thought Content:  Thought content normal          Judgment: Judgment normal        Tuesday KEVIN Barron

## 2022-12-27 DIAGNOSIS — N52.9 ERECTILE DYSFUNCTION, UNSPECIFIED ERECTILE DYSFUNCTION TYPE: ICD-10-CM

## 2022-12-27 RX ORDER — SILDENAFIL 50 MG/1
50 TABLET, FILM COATED ORAL DAILY
Qty: 10 TABLET | Refills: 0 | Status: SHIPPED | OUTPATIENT
Start: 2022-12-27

## 2023-01-06 ENCOUNTER — TELEPHONE (OUTPATIENT)
Dept: FAMILY MEDICINE CLINIC | Facility: CLINIC | Age: 38
End: 2023-01-06

## 2023-01-06 NOTE — TELEPHONE ENCOUNTER
We got a form to be filled out from [place] to be filled out    Copy attached/media    Original left in blue bin

## 2023-01-08 DIAGNOSIS — B35.1 NAIL FUNGUS: ICD-10-CM

## 2023-01-08 DIAGNOSIS — N52.9 ERECTILE DYSFUNCTION, UNSPECIFIED ERECTILE DYSFUNCTION TYPE: ICD-10-CM

## 2023-01-09 NOTE — TELEPHONE ENCOUNTER
Will defer to Tuesday who last saw patient  I have never evaluated this patient in office and has not showed to scheduled visits with me

## 2023-01-10 DIAGNOSIS — B35.1 NAIL FUNGUS: ICD-10-CM

## 2023-01-10 DIAGNOSIS — N52.9 ERECTILE DYSFUNCTION, UNSPECIFIED ERECTILE DYSFUNCTION TYPE: ICD-10-CM

## 2023-01-10 RX ORDER — SILDENAFIL 50 MG/1
50 TABLET, FILM COATED ORAL DAILY
Qty: 10 TABLET | Refills: 0 | Status: SHIPPED | OUTPATIENT
Start: 2023-01-10 | End: 2023-01-10 | Stop reason: SDUPTHER

## 2023-01-10 RX ORDER — SILDENAFIL 50 MG/1
50 TABLET, FILM COATED ORAL DAILY
Qty: 10 TABLET | Refills: 2 | Status: SHIPPED | OUTPATIENT
Start: 2023-01-10 | End: 2023-02-09

## 2023-01-10 RX ORDER — CLOTRIMAZOLE 1 %
CREAM (GRAM) TOPICAL 2 TIMES DAILY
Qty: 30 G | Refills: 3 | Status: SHIPPED | OUTPATIENT
Start: 2023-01-10 | End: 2023-02-07

## 2023-01-10 RX ORDER — CLOTRIMAZOLE 1 %
CREAM (GRAM) TOPICAL 2 TIMES DAILY
Qty: 30 G | Refills: 0 | Status: SHIPPED | OUTPATIENT
Start: 2023-01-10 | End: 2023-01-10 | Stop reason: SDUPTHER

## 2023-01-11 NOTE — TELEPHONE ENCOUNTER
Forwarding to Tuesday as I have never evaluated this patient  Additionally changed PCP from myself to her  Thank you

## 2023-01-24 DIAGNOSIS — K21.9 GASTROESOPHAGEAL REFLUX DISEASE, UNSPECIFIED WHETHER ESOPHAGITIS PRESENT: ICD-10-CM

## 2023-01-24 DIAGNOSIS — N52.9 ERECTILE DYSFUNCTION, UNSPECIFIED ERECTILE DYSFUNCTION TYPE: ICD-10-CM

## 2023-01-24 DIAGNOSIS — F19.20 DRUG ADDICTION (HCC): Primary | ICD-10-CM

## 2023-01-24 DIAGNOSIS — B35.1 NAIL FUNGUS: ICD-10-CM

## 2023-01-24 DIAGNOSIS — R10.11 RUQ PAIN: ICD-10-CM

## 2023-01-24 RX ORDER — METHADONE HYDROCHLORIDE 10 MG/1
120 TABLET ORAL DAILY
Qty: 1 TABLET | Refills: 0
Start: 2023-01-24 | End: 2023-02-23

## 2023-01-24 RX ORDER — CLOTRIMAZOLE 1 %
CREAM (GRAM) TOPICAL 2 TIMES DAILY
Qty: 30 G | Refills: 3 | Status: SHIPPED | OUTPATIENT
Start: 2023-01-24 | End: 2023-02-01 | Stop reason: SDUPTHER

## 2023-01-24 RX ORDER — SILDENAFIL 50 MG/1
50 TABLET, FILM COATED ORAL DAILY
Qty: 10 TABLET | Refills: 2 | Status: SHIPPED | OUTPATIENT
Start: 2023-01-24 | End: 2023-02-01 | Stop reason: SDUPTHER

## 2023-01-24 RX ORDER — PANTOPRAZOLE SODIUM 40 MG/1
40 TABLET, DELAYED RELEASE ORAL DAILY
Qty: 30 TABLET | Refills: 6 | Status: SHIPPED | OUTPATIENT
Start: 2023-01-24 | End: 2023-02-01 | Stop reason: SDUPTHER

## 2023-01-31 DIAGNOSIS — F17.200 CURRENT EVERY DAY SMOKER: ICD-10-CM

## 2023-01-31 RX ORDER — NICOTINE 21 MG/24HR
1 PATCH, TRANSDERMAL 24 HOURS TRANSDERMAL EVERY 24 HOURS
Qty: 28 PATCH | Refills: 3 | Status: SHIPPED | OUTPATIENT
Start: 2023-01-31 | End: 2023-01-31

## 2023-01-31 RX ORDER — NICOTINE 21 MG/24HR
1 PATCH, TRANSDERMAL 24 HOURS TRANSDERMAL EVERY 24 HOURS
Qty: 28 PATCH | Refills: 3 | Status: SHIPPED | OUTPATIENT
Start: 2023-01-31

## 2023-02-01 DIAGNOSIS — N52.9 ERECTILE DYSFUNCTION, UNSPECIFIED ERECTILE DYSFUNCTION TYPE: ICD-10-CM

## 2023-02-01 DIAGNOSIS — K21.9 GASTROESOPHAGEAL REFLUX DISEASE, UNSPECIFIED WHETHER ESOPHAGITIS PRESENT: ICD-10-CM

## 2023-02-01 DIAGNOSIS — B35.1 NAIL FUNGUS: ICD-10-CM

## 2023-02-01 RX ORDER — SILDENAFIL 50 MG/1
50 TABLET, FILM COATED ORAL DAILY
Qty: 10 TABLET | Refills: 0 | Status: SHIPPED | OUTPATIENT
Start: 2023-02-01 | End: 2023-03-03

## 2023-02-01 RX ORDER — PANTOPRAZOLE SODIUM 40 MG/1
40 TABLET, DELAYED RELEASE ORAL DAILY
Qty: 30 TABLET | Refills: 0 | Status: SHIPPED | OUTPATIENT
Start: 2023-02-01 | End: 2023-03-03

## 2023-02-01 RX ORDER — CLOTRIMAZOLE 1 %
CREAM (GRAM) TOPICAL 2 TIMES DAILY
Qty: 30 G | Refills: 0 | Status: SHIPPED | OUTPATIENT
Start: 2023-02-01 | End: 2023-03-01

## 2023-02-13 DIAGNOSIS — F17.200 CURRENT EVERY DAY SMOKER: ICD-10-CM

## 2023-02-13 DIAGNOSIS — K21.9 GASTROESOPHAGEAL REFLUX DISEASE, UNSPECIFIED WHETHER ESOPHAGITIS PRESENT: ICD-10-CM

## 2023-02-13 DIAGNOSIS — N52.9 ERECTILE DYSFUNCTION, UNSPECIFIED ERECTILE DYSFUNCTION TYPE: ICD-10-CM

## 2023-02-14 RX ORDER — SILDENAFIL 50 MG/1
50 TABLET, FILM COATED ORAL DAILY
Qty: 10 TABLET | Refills: 0 | Status: SHIPPED | OUTPATIENT
Start: 2023-02-14 | End: 2023-02-20 | Stop reason: SDUPTHER

## 2023-02-14 RX ORDER — PANTOPRAZOLE SODIUM 40 MG/1
40 TABLET, DELAYED RELEASE ORAL DAILY
Qty: 30 TABLET | Refills: 0 | Status: SHIPPED | OUTPATIENT
Start: 2023-02-14 | End: 2023-03-16

## 2023-02-14 RX ORDER — NICOTINE 21 MG/24HR
1 PATCH, TRANSDERMAL 24 HOURS TRANSDERMAL EVERY 24 HOURS
Qty: 28 PATCH | Refills: 0 | Status: SHIPPED | OUTPATIENT
Start: 2023-02-14 | End: 2023-03-16

## 2023-02-20 DIAGNOSIS — B35.1 NAIL FUNGUS: ICD-10-CM

## 2023-02-20 DIAGNOSIS — N52.9 ERECTILE DYSFUNCTION, UNSPECIFIED ERECTILE DYSFUNCTION TYPE: ICD-10-CM

## 2023-02-20 RX ORDER — SILDENAFIL 50 MG/1
50 TABLET, FILM COATED ORAL DAILY
Qty: 10 TABLET | Refills: 0 | Status: SHIPPED | OUTPATIENT
Start: 2023-02-20 | End: 2023-03-22

## 2023-02-20 RX ORDER — CLOTRIMAZOLE 1 %
CREAM (GRAM) TOPICAL 2 TIMES DAILY
Qty: 30 G | Refills: 0 | Status: SHIPPED | OUTPATIENT
Start: 2023-02-20 | End: 2023-03-20

## 2023-02-21 ENCOUNTER — OFFICE VISIT (OUTPATIENT)
Dept: FAMILY MEDICINE CLINIC | Facility: CLINIC | Age: 38
End: 2023-02-21

## 2023-02-21 VITALS
RESPIRATION RATE: 18 BRPM | SYSTOLIC BLOOD PRESSURE: 112 MMHG | OXYGEN SATURATION: 97 % | HEART RATE: 74 BPM | WEIGHT: 218 LBS | HEIGHT: 72 IN | BODY MASS INDEX: 29.53 KG/M2 | DIASTOLIC BLOOD PRESSURE: 70 MMHG | TEMPERATURE: 96.8 F

## 2023-02-21 DIAGNOSIS — J02.9 SORE THROAT: Primary | ICD-10-CM

## 2023-02-21 DIAGNOSIS — J45.909 ASTHMA, UNSPECIFIED ASTHMA SEVERITY, UNSPECIFIED WHETHER COMPLICATED, UNSPECIFIED WHETHER PERSISTENT: ICD-10-CM

## 2023-02-21 RX ORDER — ALBUTEROL SULFATE 90 UG/1
2 AEROSOL, METERED RESPIRATORY (INHALATION) EVERY 6 HOURS PRN
Qty: 8 G | Refills: 0 | Status: SHIPPED | OUTPATIENT
Start: 2023-02-21

## 2023-02-21 NOTE — PROGRESS NOTES
[facility] Office visit    Assessment/Plan:     1  Sore throat  Comments:  Likely viral etiology  Recommended flu/covid test, however patient left without testing  Recommend symptomatic tx, salt water gargle and cepacol  Orders:  -     menthol-cetylpyridinium (CEPACOL) 3 MG lozenge; Take 1 lozenge (3 mg total) by mouth as needed for sore throat    2  Asthma, unspecified asthma severity, unspecified whether complicated, unspecified whether persistent  Comments:  refills provided  Orders:  -     albuterol (PROVENTIL HFA,VENTOLIN HFA) 90 mcg/act inhaler; Inhale 2 puffs every 6 (six) hours as needed for wheezing     Patient also wished to increase dose of vistaril and viagra  Was advised that given this a 20 minute same day appointment, appropriate time to discuss his ED and anxiety is not available and he should schedule a follow up  He is already prescribed viagra 50mg and vistaril 25mg  Unclear ED etiology and if higher dose of viagra would be of benefit  For anxiety would need to further discuss FELIPE vs specific anxiety causes  SSRI may be considered, although this could worsen ED  Advised to schedule dedicated follow up so specific issues can be addressed fully  No follow-ups on file  Subjective:   Sore Throat   This is a new problem  The current episode started in the past 7 days (3 days ago)  The problem has been unchanged  Associated symptoms include congestion, shortness of breath, swollen glands and trouble swallowing  Pertinent negatives include no coughing  Treatments tried: cough drops  Kj Lopez is a 40 y o  male here for same day visit for sore throat  Pt also wants to increase dose of viagra and hydroxyzine  Review of Systems   HENT: Positive for congestion, sore throat and trouble swallowing  Respiratory: Positive for shortness of breath  Negative for cough           Objective:     /70 (BP Location: Left arm, Patient Position: Sitting, Cuff Size: Large) Pulse 74   Temp (!) 96 8 °F (36 °C) (Tympanic Core)   Resp 18   Ht 6' (1 829 m)   Wt 98 9 kg (218 lb)   SpO2 97%   BMI 29 57 kg/m²      Physical Exam  Vitals reviewed  Constitutional:       Appearance: Normal appearance  HENT:      Head: Normocephalic and atraumatic  Nose: Congestion present  Mouth/Throat:      Mouth: Mucous membranes are moist       Pharynx: Posterior oropharyngeal erythema present  Eyes:      Extraocular Movements: Extraocular movements intact  Pupils: Pupils are equal, round, and reactive to light  Cardiovascular:      Rate and Rhythm: Normal rate and regular rhythm  Pulses: Normal pulses  Heart sounds: Normal heart sounds  Pulmonary:      Effort: Pulmonary effort is normal       Breath sounds: Normal breath sounds  Abdominal:      General: Bowel sounds are normal       Palpations: Abdomen is soft  Skin:     General: Skin is warm  Capillary Refill: Capillary refill takes less than 2 seconds  Neurological:      General: No focal deficit present  Mental Status: He is alert     Psychiatric:         Mood and Affect: Mood normal          Behavior: Behavior normal           ** Please Note: This note has been constructed using a voice recognition system **     Cindy Kingston MD  02/21/23  2:01 PM

## 2023-03-06 DIAGNOSIS — F17.200 CURRENT EVERY DAY SMOKER: ICD-10-CM

## 2023-03-06 RX ORDER — NICOTINE 21 MG/24HR
1 PATCH, TRANSDERMAL 24 HOURS TRANSDERMAL EVERY 24 HOURS
Qty: 28 PATCH | Refills: 0 | Status: SHIPPED | OUTPATIENT
Start: 2023-03-06 | End: 2023-04-05

## 2023-03-06 NOTE — TELEPHONE ENCOUNTER
Will fill as courtesy  I have never met this patient  Patient needs to establish care with intern or continue seeing Tuesday

## 2023-03-07 DIAGNOSIS — N52.9 ERECTILE DYSFUNCTION, UNSPECIFIED ERECTILE DYSFUNCTION TYPE: ICD-10-CM

## 2023-03-07 DIAGNOSIS — K21.9 GASTROESOPHAGEAL REFLUX DISEASE, UNSPECIFIED WHETHER ESOPHAGITIS PRESENT: ICD-10-CM

## 2023-03-07 RX ORDER — PANTOPRAZOLE SODIUM 40 MG/1
40 TABLET, DELAYED RELEASE ORAL DAILY
Qty: 30 TABLET | Refills: 0 | Status: SHIPPED | OUTPATIENT
Start: 2023-03-07 | End: 2023-04-06

## 2023-03-07 RX ORDER — SILDENAFIL 50 MG/1
50 TABLET, FILM COATED ORAL DAILY
Qty: 30 TABLET | Refills: 0 | Status: SHIPPED | OUTPATIENT
Start: 2023-03-07 | End: 2023-03-15

## 2023-03-14 DIAGNOSIS — N52.9 ERECTILE DYSFUNCTION, UNSPECIFIED ERECTILE DYSFUNCTION TYPE: ICD-10-CM

## 2023-03-15 RX ORDER — SILDENAFIL 50 MG/1
TABLET, FILM COATED ORAL
Qty: 10 TABLET | Refills: 0 | Status: SHIPPED | OUTPATIENT
Start: 2023-03-15 | End: 2023-03-24 | Stop reason: SDUPTHER

## 2023-03-21 DIAGNOSIS — N52.9 ERECTILE DYSFUNCTION, UNSPECIFIED ERECTILE DYSFUNCTION TYPE: ICD-10-CM

## 2023-03-21 DIAGNOSIS — J45.909 ASTHMA, UNSPECIFIED ASTHMA SEVERITY, UNSPECIFIED WHETHER COMPLICATED, UNSPECIFIED WHETHER PERSISTENT: ICD-10-CM

## 2023-03-21 RX ORDER — SILDENAFIL 50 MG/1
TABLET, FILM COATED ORAL
Qty: 10 TABLET | Refills: 0 | OUTPATIENT
Start: 2023-03-21

## 2023-03-21 RX ORDER — ALBUTEROL SULFATE 90 UG/1
2 AEROSOL, METERED RESPIRATORY (INHALATION) EVERY 6 HOURS PRN
Qty: 8.5 G | OUTPATIENT
Start: 2023-03-21

## 2023-03-21 NOTE — TELEPHONE ENCOUNTER
Pt has no showed with multiple providers  Has only been seen in last 2 years for toe infection and sore throat  Called pt to schedule an appt and inform no refills until seen  Pt answered then disconnected after saying I was calling from [facility]

## 2023-03-21 NOTE — TELEPHONE ENCOUNTER
Never under my care despite listed as PCP (largely noncompliant under my care, never saw patient in office)  Recommend changing PCP to resident who will be here next year

## 2023-03-24 ENCOUNTER — OFFICE VISIT (OUTPATIENT)
Dept: FAMILY MEDICINE CLINIC | Facility: CLINIC | Age: 38
End: 2023-03-24

## 2023-03-24 VITALS
TEMPERATURE: 97 F | RESPIRATION RATE: 16 BRPM | OXYGEN SATURATION: 98 % | WEIGHT: 214.2 LBS | SYSTOLIC BLOOD PRESSURE: 140 MMHG | HEIGHT: 71 IN | BODY MASS INDEX: 29.99 KG/M2 | HEART RATE: 74 BPM | DIASTOLIC BLOOD PRESSURE: 82 MMHG

## 2023-03-24 DIAGNOSIS — R76.8 HEPATITIS C ANTIBODY POSITIVE IN BLOOD: ICD-10-CM

## 2023-03-24 DIAGNOSIS — N52.9 ERECTILE DYSFUNCTION, UNSPECIFIED ERECTILE DYSFUNCTION TYPE: Primary | ICD-10-CM

## 2023-03-24 PROBLEM — F41.9 ANXIETY: Status: ACTIVE | Noted: 2023-03-24

## 2023-03-24 RX ORDER — SILDENAFIL 50 MG/1
50 TABLET, FILM COATED ORAL DAILY
Qty: 30 TABLET | Refills: 0 | Status: SHIPPED | OUTPATIENT
Start: 2023-03-24

## 2023-03-24 NOTE — PROGRESS NOTES
[facility] Office visit    Assessment/Plan:     1  Erectile dysfunction, unspecified erectile dysfunction type  -     Lipid panel; Future  -     sildenafil (VIAGRA) 50 MG tablet; Take 1 tablet (50 mg total) by mouth daily  -     Testosterone; Future  -     Lipid panel  -     Testosterone    2  Hepatitis C antibody positive in blood  -     Comprehensive metabolic panel; Future  -     Hepatitis C RNA, quantitative, PCR; Future  -     Comprehensive metabolic panel  -     Hepatitis C RNA, quantitative, PCR          Return in about 2 weeks (around 4/7/2023) for Annual physical  Pt is also coming back to adjust his anxiety medication in April 24 th for a long visit  PT currently has no suicidal ideation or intent to harm himself or others  Subjective:   Pt is here for his medication refill for Viagra  Pt had Erectile dysfunction since 3 years ago and gradually getting worse  No more morning erection since one year  Pt is  and has one sexual partner  Even with foreplay, pt cannot get erection without Viagra  Pt is no longer able to have natural erection since 3 years ago  Pt has been using Viagra for 2 years  Pt states that ED is not correlated to his anxiety  Madeline Hernandez is a 40 y o  male here for medication refill     Review of Systems   Constitutional: Negative for chills and fever  HENT: Negative for ear pain and sore throat  Eyes: Negative for pain and visual disturbance  Respiratory: Negative for cough and shortness of breath  Cardiovascular: Negative for chest pain and palpitations  Gastrointestinal: Negative for abdominal pain, constipation, diarrhea, nausea and vomiting  Genitourinary: Negative for dysuria, hematuria, penile discharge, penile pain, penile swelling, scrotal swelling and testicular pain  Erectile dysfunction    Musculoskeletal: Negative for arthralgias, back pain, neck pain and neck stiffness  Skin: Negative for color change and rash  Neurological: Negative for dizziness, seizures, syncope, weakness and headaches  Psychiatric/Behavioral: Negative for agitation, behavioral problems and confusion  The patient is not nervous/anxious  All other systems reviewed and are negative  Objective:     /82 (BP Location: Left arm, Patient Position: Sitting, Cuff Size: Large)   Pulse 74   Temp (!) 97 °F (36 1 °C)   Resp 16   Ht 5' 11" (1 803 m)   Wt 97 2 kg (214 lb 3 2 oz)   SpO2 98%   BMI 29 87 kg/m²      Physical Exam  Vitals reviewed  Constitutional:       General: He is not in acute distress  Appearance: Normal appearance  He is not ill-appearing  HENT:      Head: Normocephalic and atraumatic  Right Ear: External ear normal       Left Ear: External ear normal       Nose: Nose normal  No congestion or rhinorrhea  Mouth/Throat:      Mouth: Mucous membranes are moist    Eyes:      Extraocular Movements: Extraocular movements intact  Conjunctiva/sclera: Conjunctivae normal    Cardiovascular:      Rate and Rhythm: Normal rate and regular rhythm  Pulses: Normal pulses  Femoral pulses are 2+ on the right side and 2+ on the left side  Heart sounds: Normal heart sounds  No murmur heard  No gallop  Pulmonary:      Effort: Pulmonary effort is normal  No respiratory distress  Breath sounds: Normal breath sounds  No stridor  No wheezing, rhonchi or rales  Chest:      Chest wall: No tenderness  Abdominal:      General: Abdomen is flat  Bowel sounds are normal  There is no distension  Palpations: Abdomen is soft  Tenderness: There is no abdominal tenderness  There is no guarding  Musculoskeletal:         General: No swelling, tenderness or deformity  Normal range of motion  Cervical back: Normal range of motion and neck supple  Right lower leg: No edema  Left lower leg: No edema  Skin:     General: Skin is warm and dry        Capillary Refill: Capillary refill takes less than 2 seconds  Findings: No bruising, erythema, lesion or rash  Neurological:      General: No focal deficit present  Mental Status: He is alert and oriented to person, place, and time  Psychiatric:         Mood and Affect: Mood normal          Speech: Speech normal          Behavior: Behavior normal          Thought Content: Thought content normal  Thought content does not include homicidal or suicidal ideation  Thought content does not include homicidal or suicidal plan            ** Please Note: This note has been constructed using a voice recognition system **     Cristal Ulrich MD  03/24/23  12:22 PM

## 2023-04-04 DIAGNOSIS — J45.909 ASTHMA, UNSPECIFIED ASTHMA SEVERITY, UNSPECIFIED WHETHER COMPLICATED, UNSPECIFIED WHETHER PERSISTENT: ICD-10-CM

## 2023-04-04 RX ORDER — ALBUTEROL SULFATE 90 UG/1
2 AEROSOL, METERED RESPIRATORY (INHALATION) EVERY 6 HOURS PRN
Qty: 8.5 G | Refills: 1 | Status: SHIPPED | OUTPATIENT
Start: 2023-04-04

## 2023-04-14 PROBLEM — B18.2 CHRONIC HEPATITIS C VIRUS INFECTION (HCC): Status: ACTIVE | Noted: 2023-04-14

## 2023-04-24 ENCOUNTER — TELEPHONE (OUTPATIENT)
Dept: FAMILY MEDICINE CLINIC | Facility: CLINIC | Age: 38
End: 2023-04-24

## 2023-04-24 NOTE — TELEPHONE ENCOUNTER
Pt was called to notify his lab results  Pt was called multiple times past two weeks but no answers  I was going to go over his lab results at today's visit, but Pt no-showed  Pt has history of non-compliance to his appointments  Will go over his lab results and precautions to Hep C and treatment needs at next appointment with me

## 2023-04-27 RX ORDER — AMOXICILLIN 500 MG/1
500 CAPSULE ORAL 3 TIMES DAILY
COMMUNITY
Start: 2023-04-19

## 2023-05-31 ENCOUNTER — TELEPHONE (OUTPATIENT)
Dept: FAMILY MEDICINE CLINIC | Facility: CLINIC | Age: 38
End: 2023-05-31

## 2023-05-31 NOTE — TELEPHONE ENCOUNTER
Received record release request from [id number] [facility] requesting all patient records  Sent to [id number]  [place]- please remove Dr Ronaldo Lew as PCP

## 2023-06-07 NOTE — TELEPHONE ENCOUNTER
06/07/23 6:12 PM        The office's request has been received, reviewed, and the patient chart updated  The PCP has successfully been removed with a patient attribution note  This message will now be completed          Thank you  Lev Carvajal

## 2023-06-25 ENCOUNTER — APPOINTMENT (EMERGENCY)
Dept: CT IMAGING | Facility: HOSPITAL | Age: 38
End: 2023-06-25
Payer: COMMERCIAL

## 2023-06-25 ENCOUNTER — HOSPITAL ENCOUNTER (EMERGENCY)
Facility: HOSPITAL | Age: 38
Discharge: HOME | End: 2023-06-25
Attending: EMERGENCY MEDICINE
Payer: COMMERCIAL

## 2023-06-25 VITALS
SYSTOLIC BLOOD PRESSURE: 156 MMHG | WEIGHT: 212.08 LBS | OXYGEN SATURATION: 97 % | BODY MASS INDEX: 29.58 KG/M2 | DIASTOLIC BLOOD PRESSURE: 88 MMHG | TEMPERATURE: 98.1 F | HEART RATE: 78 BPM | RESPIRATION RATE: 18 BRPM

## 2023-06-25 DIAGNOSIS — S61.419A HAND LACERATION: ICD-10-CM

## 2023-06-25 DIAGNOSIS — S09.90XA INJURY OF HEAD, INITIAL ENCOUNTER: ICD-10-CM

## 2023-06-25 DIAGNOSIS — S02.30XA ORBITAL FLOOR FRACTURE (HCC): ICD-10-CM

## 2023-06-25 DIAGNOSIS — S01.01XA LACERATION OF SCALP, INITIAL ENCOUNTER: ICD-10-CM
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
## 2023-06-25 DIAGNOSIS — S02.2XXA NASAL FRACTURE: Primary | ICD-10-CM

## 2023-06-25 DIAGNOSIS — S01.81XA FACIAL LACERATION, INITIAL ENCOUNTER: ICD-10-CM

## 2023-06-25 LAB
ABO GROUP BLD: NORMAL
ABO GROUP BLD: NORMAL
ALBUMIN SERPL BCP-MCNC: 4.6 G/DL (ref 3.5–5)
ALP SERPL-CCNC: 49 U/L (ref 34–104)
ALT SERPL W P-5'-P-CCNC: 30 U/L (ref 7–52)
ANION GAP SERPL CALCULATED.3IONS-SCNC: 11 MMOL/L
APTT PPP: 22 SECONDS (ref 23–37)
AST SERPL W P-5'-P-CCNC: 31 U/L (ref 13–39)
BASOPHILS # BLD AUTO: 0.06 THOUSANDS/ÂΜL (ref 0–0.1)
BASOPHILS NFR BLD AUTO: 1 % (ref 0–1)
BILIRUB SERPL-MCNC: 0.4 MG/DL (ref 0.2–1)
BLD GP AB SCN SERPL QL: NEGATIVE
BUN SERPL-MCNC: 13 MG/DL (ref 5–25)
CALCIUM SERPL-MCNC: 9.4 MG/DL (ref 8.4–10.2)
CHLORIDE SERPL-SCNC: 105 MMOL/L (ref 96–108)
CO2 SERPL-SCNC: 22 MMOL/L (ref 21–32)
CREAT SERPL-MCNC: 0.73 MG/DL (ref 0.6–1.3)
EOSINOPHIL # BLD AUTO: 0.15 THOUSAND/ÂΜL (ref 0–0.61)
EOSINOPHIL NFR BLD AUTO: 1 % (ref 0–6)
ERYTHROCYTE [DISTWIDTH] IN BLOOD BY AUTOMATED COUNT: 13.2 % (ref 11.6–15.1)
ETHANOL SERPL-MCNC: 173 MG/DL
GFR SERPL CREATININE-BSD FRML MDRD: 119 ML/MIN/1.73SQ M
GLUCOSE SERPL-MCNC: 88 MG/DL (ref 65–140)
HCT VFR BLD AUTO: 45.7 % (ref 36.5–49.3)
HGB BLD-MCNC: 15.2 G/DL (ref 12–17)
IMM GRANULOCYTES # BLD AUTO: 0.06 THOUSAND/UL (ref 0–0.2)
IMM GRANULOCYTES NFR BLD AUTO: 1 % (ref 0–2)
INR PPP: 0.92 (ref 0.84–1.19)
LYMPHOCYTES # BLD AUTO: 2.78 THOUSANDS/ÂΜL (ref 0.6–4.47)
LYMPHOCYTES NFR BLD AUTO: 23 % (ref 14–44)
MCH RBC QN AUTO: 31.9 PG (ref 26.8–34.3)
MCHC RBC AUTO-ENTMCNC: 33.3 G/DL (ref 31.4–37.4)
MCV RBC AUTO: 96 FL (ref 82–98)
MONOCYTES # BLD AUTO: 0.66 THOUSAND/ÂΜL (ref 0.17–1.22)
MONOCYTES NFR BLD AUTO: 5 % (ref 4–12)
NEUTROPHILS # BLD AUTO: 8.66 THOUSANDS/ÂΜL (ref 1.85–7.62)
NEUTS SEG NFR BLD AUTO: 69 % (ref 43–75)
NRBC BLD AUTO-RTO: 0 /100 WBCS
PLATELET # BLD AUTO: 192 THOUSANDS/UL (ref 149–390)
PMV BLD AUTO: 10.2 FL (ref 8.9–12.7)
POTASSIUM SERPL-SCNC: 3.7 MMOL/L (ref 3.5–5.3)
PROT SERPL-MCNC: 7.5 G/DL (ref 6.4–8.4)
PROTHROMBIN TIME: 12.6 SECONDS (ref 11.6–14.5)
RBC # BLD AUTO: 4.77 MILLION/UL (ref 3.88–5.62)
RH BLD: POSITIVE
RH BLD: POSITIVE
SODIUM SERPL-SCNC: 138 MMOL/L (ref 135–147)
SPECIMEN EXPIRATION DATE: NORMAL
WBC # BLD AUTO: 12.37 THOUSAND/UL (ref 4.31–10.16)

## 2023-06-25 PROCEDURE — G1004 CDSM NDSC: HCPCS

## 2023-06-25 PROCEDURE — 99285 EMERGENCY DEPT VISIT HI MDM: CPT | Performed by: EMERGENCY MEDICINE

## 2023-06-25 PROCEDURE — 86900 BLOOD TYPING SEROLOGIC ABO: CPT

## 2023-06-25 PROCEDURE — 96375 TX/PRO/DX INJ NEW DRUG ADDON: CPT

## 2023-06-25 PROCEDURE — 80053 COMPREHEN METABOLIC PANEL: CPT

## 2023-06-25 PROCEDURE — 70450 CT HEAD/BRAIN W/O DYE: CPT

## 2023-06-25 PROCEDURE — 12015 RPR F/E/E/N/L/M 7.6-12.5 CM: CPT | Performed by: EMERGENCY MEDICINE

## 2023-06-25 PROCEDURE — 99284 EMERGENCY DEPT VISIT MOD MDM: CPT

## 2023-06-25 PROCEDURE — 96365 THER/PROPH/DIAG IV INF INIT: CPT

## 2023-06-25 PROCEDURE — 82077 ASSAY SPEC XCP UR&BREATH IA: CPT

## 2023-06-25 PROCEDURE — 85610 PROTHROMBIN TIME: CPT

## 2023-06-25 PROCEDURE — 85025 COMPLETE CBC W/AUTO DIFF WBC: CPT

## 2023-06-25 PROCEDURE — 70486 CT MAXILLOFACIAL W/O DYE: CPT

## 2023-06-25 PROCEDURE — 36415 COLL VENOUS BLD VENIPUNCTURE: CPT

## 2023-06-25 PROCEDURE — 85730 THROMBOPLASTIN TIME PARTIAL: CPT

## 2023-06-25 PROCEDURE — 72125 CT NECK SPINE W/O DYE: CPT

## 2023-06-25 PROCEDURE — 12002 RPR S/N/AX/GEN/TRNK2.6-7.5CM: CPT | Performed by: EMERGENCY MEDICINE

## 2023-06-25 PROCEDURE — 86850 RBC ANTIBODY SCREEN: CPT

## 2023-06-25 PROCEDURE — 86901 BLOOD TYPING SEROLOGIC RH(D): CPT

## 2023-06-25 RX ORDER — METHADONE HYDROCHLORIDE 10 MG/ML
140 CONCENTRATE ORAL DAILY
COMMUNITY

## 2023-06-25 RX ORDER — HYDROMORPHONE HCL/PF 1 MG/ML
0.5 SYRINGE (ML) INJECTION ONCE
Status: COMPLETED | OUTPATIENT
Start: 2023-06-25 | End: 2023-06-25

## 2023-06-25 RX ORDER — AMOXICILLIN AND CLAVULANATE POTASSIUM 875; 125 MG/1; MG/1
1 TABLET, FILM COATED ORAL EVERY 12 HOURS SCHEDULED
Qty: 14 TABLET | Refills: 0 | Status: SHIPPED | OUTPATIENT
Start: 2023-06-25 | End: 2023-07-02

## 2023-06-25 RX ORDER — LIDOCAINE HYDROCHLORIDE 10 MG/ML
10 INJECTION, SOLUTION EPIDURAL; INFILTRATION; INTRACAUDAL; PERINEURAL ONCE
Status: COMPLETED | OUTPATIENT
Start: 2023-06-25 | End: 2023-06-25

## 2023-06-25 RX ADMIN — LIDOCAINE HYDROCHLORIDE 10 ML: 10 INJECTION, SOLUTION EPIDURAL; INFILTRATION; INTRACAUDAL; PERINEURAL at 07:18

## 2023-06-25 RX ADMIN — SODIUM CHLORIDE 3 G: 9 INJECTION, SOLUTION INTRAVENOUS at 06:21

## 2023-06-25 RX ADMIN — HYDROMORPHONE HYDROCHLORIDE 0.5 MG: 1 INJECTION, SOLUTION INTRAMUSCULAR; INTRAVENOUS; SUBCUTANEOUS at 04:38
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
## 2023-06-30 ENCOUNTER — OFFICE VISIT (OUTPATIENT)
Dept: URGENT CARE | Facility: CLINIC | Age: 38
End: 2023-06-30
Payer: COMMERCIAL

## 2023-06-30 VITALS
HEIGHT: 71 IN | HEART RATE: 85 BPM | TEMPERATURE: 98.9 F | RESPIRATION RATE: 20 BRPM | WEIGHT: 212 LBS | BODY MASS INDEX: 29.68 KG/M2 | OXYGEN SATURATION: 99 % | SYSTOLIC BLOOD PRESSURE: 159 MMHG | DIASTOLIC BLOOD PRESSURE: 100 MMHG

## 2023-06-30 DIAGNOSIS — Z48.02 ENCOUNTER FOR REMOVAL OF SUTURES: Primary | ICD-10-CM

## 2023-06-30 NOTE — PATIENT INSTRUCTIONS
Sutures and staples removed from multiple lacerations, all wounds appear to be healing well, no signs of infection  Advised to finish course of prescribed antibiotics  Discussed with any progression or worsening of symptoms to return or be seen in ER  Strongly encouraged scheduling follow-up appointment with ENT for further evaluation and management of fracture findings from visit in ED last week
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
## 2023-08-11 ENCOUNTER — APPOINTMENT (EMERGENCY)
Dept: RADIOLOGY | Facility: HOSPITAL | Age: 38
End: 2023-08-11
Payer: COMMERCIAL

## 2023-08-11 ENCOUNTER — HOSPITAL ENCOUNTER (EMERGENCY)
Facility: HOSPITAL | Age: 38
Discharge: HOME | End: 2023-08-11
Attending: EMERGENCY MEDICINE
Payer: COMMERCIAL

## 2023-08-11 VITALS
HEART RATE: 81 BPM | SYSTOLIC BLOOD PRESSURE: 132 MMHG | RESPIRATION RATE: 20 BRPM | WEIGHT: 210 LBS | OXYGEN SATURATION: 98 % | BODY MASS INDEX: 29.29 KG/M2 | TEMPERATURE: 97.7 F | DIASTOLIC BLOOD PRESSURE: 89 MMHG

## 2023-08-11 DIAGNOSIS — S93.409A ANKLE SPRAIN: Primary | ICD-10-CM

## 2023-08-11 PROCEDURE — NC001 PR NO CHARGE: Performed by: EMERGENCY MEDICINE

## 2023-08-11 PROCEDURE — 73630 X-RAY EXAM OF FOOT: CPT

## 2023-08-11 PROCEDURE — 99284 EMERGENCY DEPT VISIT MOD MDM: CPT | Performed by: EMERGENCY MEDICINE

## 2023-08-11 PROCEDURE — 73610 X-RAY EXAM OF ANKLE: CPT

## 2023-08-11 PROCEDURE — 99283 EMERGENCY DEPT VISIT LOW MDM: CPT

## 2023-08-11 NOTE — ED PROVIDER NOTES
History  Chief Complaint   Patient presents with   • Ankle Pain     Pt reports r ankle pain twisted it Wed night     Patient presents for evaluation after right ankle injury. Patient states he twisted it on Wednesday night. Denies any other injury or trauma. States he is now unable to bear weight on the right ankle. History provided by:  Patient   used: No    Ankle Pain      Prior to Admission Medications   Prescriptions Last Dose Informant Patient Reported? Taking?    albuterol (PROVENTIL HFA,VENTOLIN HFA) 90 mcg/act inhaler   No No   Sig: INHALE 2 PUFFS EVERY 6 (SIX) HOURS AS NEEDED FOR WHEEZING   amoxicillin (AMOXIL) 500 mg capsule   Yes No   Sig: Take 500 mg by mouth 3 (three) times a day   clotrimazole (LOTRIMIN) 1 % cream   No No   Sig: Apply topically 2 (two) times a day for 28 days   dicyclomine (BENTYL) 20 mg tablet   No No   Sig: Take 1 tablet (20 mg total) by mouth 2 (two) times a day   Patient not taking: Reported on 3/24/2023   hydrOXYzine pamoate (VISTARIL) 25 mg capsule   No No   Sig: TAKE AS NEEDED FOR ANXIETY   methadone (DOLOPHINE) 10 mg/mL oral concentrated solution  Self Yes No   Sig: Take 140 mg by mouth in the morning   nicotine (NICODERM CQ) 14 mg/24hr TD 24 hr patch   No No   Sig: Place 1 patch on the skin over 24 hours every 24 hours   nicotine (NICODERM CQ) 21 mg/24 hr TD 24 hr patch   No No   Sig: Place 1 patch on the skin over 24 hours every 24 hours   pantoprazole (PROTONIX) 40 mg tablet   No No   Sig: TAKE 1 TABLET (40 MG TOTAL) BY MOUTH DAILY   sildenafil (VIAGRA) 50 MG tablet   No No   Sig: Take 1 tablet (50 mg total) by mouth daily      Facility-Administered Medications: None       Past Medical History:   Diagnosis Date   • Abscess of right hand 12/11/2020   • ADHD (attention deficit hyperactivity disorder)    • Hepatitis C    • Hypertension    • Inguinal hernia    • Laceration of right hand without foreign body 12/18/2020   • Methadone maintenance therapy patient [facility])    • Opiate addiction ([address])    • Psychiatric disorder     anxiety, depression, social personality diorder   • Renal calculus, bilateral 03/07/2017       No past surgical history on file. Family History   Problem Relation Age of Onset   • Alcohol abuse Mother    • Substance Abuse Mother    • Hypertension Mother    • Substance Abuse Father    • Alcohol abuse Father    • Heart disease Father    • Arthritis Father    • Mental illness Brother    • Dementia Maternal Grandfather    • Heart disease Maternal Grandfather    • Diabetes Maternal Grandfather    • Arthritis Maternal Grandfather      I have reviewed and agree with the history as documented. E-Cigarette/Vaping   • E-Cigarette Use Never User      E-Cigarette/Vaping Substances   • Nicotine No    • THC No    • CBD No    • Flavoring No    • Other No    • Unknown No      Social History     Tobacco Use   • Smoking status: Every Day     Packs/day: 0.50     Types: Cigarettes     Start date: 2001   • Smokeless tobacco: Never   Vaping Use   • Vaping Use: Never used   Substance Use Topics   • Alcohol use: Yes     Comment: soc   • Drug use: Yes     Types: Marijuana       Review of Systems   All other systems reviewed and are negative. Physical Exam  Physical Exam  Vitals and nursing note reviewed. Constitutional:       General: He is not in acute distress. Musculoskeletal:         General: Swelling and tenderness present. No deformity. Normal range of motion. Feet:    Skin:     Capillary Refill: Capillary refill takes less than 2 seconds. Findings: Bruising present. No rash. Neurological:      General: No focal deficit present. Mental Status: He is alert and oriented to person, place, and time.          Vital Signs  ED Triage Vitals [08/11/23 1626]   Temperature Pulse Respirations Blood Pressure SpO2   97.7 °F (36.5 °C) 81 20 132/89 98 %      Temp Source Heart Rate Source Patient Position - Orthostatic VS BP Location FiO2 (%) Temporal -- Sitting Left arm --      Pain Score       --           Vitals:    08/11/23 1626   BP: 132/89   Pulse: 81   Patient Position - Orthostatic VS: Sitting         Visual Acuity      ED Medications  Medications - No data to display    Diagnostic Studies  Results Reviewed     None                 XR ankle 3+ views RIGHT    (Results Pending)   XR foot 3+ views RIGHT    (Results Pending)              Procedures  Procedures         ED Course                                             Medical Decision Making  Pulse ox 98% on room air indicating adequate oxygenation. Xray R ankle: No fracture or dislocation as read by me  Xray R foot: No fracture or dislocation as read by me    Ankle sprain: acute illness or injury  Amount and/or Complexity of Data Reviewed  Radiology: ordered and independent interpretation performed. Disposition  Final diagnoses: Ankle sprain     Time reflects when diagnosis was documented in both MDM as applicable and the Disposition within this note     Time User Action Codes Description Comment    8/11/2023  5:14 PM Kizzy Saenz Add [G07.751R] Ankle sprain       ED Disposition     ED Disposition   Discharge    Condition   Stable    Date/Time   Fri Aug 11, 2023  5:14 PM    Comment   Justin Tello discharge to home/self care.                Follow-up Information     Follow up With Specialties Details Why [address], DO Orthopedic Surgery In 1 week  [address] [place] [address], [address] [place] [address]  [phone]            Discharge Medication List as of 8/11/2023  5:23 PM      CONTINUE these medications which have NOT CHANGED    Details   albuterol (PROVENTIL HFA,VENTOLIN HFA) 90 mcg/act inhaler INHALE 2 PUFFS EVERY 6 (SIX) HOURS AS NEEDED FOR WHEEZING, Starting Tue 4/4/2023, Normal      amoxicillin (AMOXIL) 500 mg capsule Take 500 mg by mouth 3 (three) times a day, Starting Wed 4/19/2023, Historical Med      clotrimazole (LOTRIMIN) 1 % cream Apply topically 2 (two) times a day for 28 days, Starting Mon 2/20/2023, Until Fri 3/24/2023, Normal      dicyclomine (BENTYL) 20 mg tablet Take 1 tablet (20 mg total) by mouth 2 (two) times a day, Starting Thu 2/3/2022, Normal      hydrOXYzine pamoate (VISTARIL) 25 mg capsule TAKE AS NEEDED FOR ANXIETY, Normal      methadone (DOLOPHINE) 10 mg/mL oral concentrated solution Take 140 mg by mouth in the morning, Historical Med      nicotine (NICODERM CQ) 14 mg/24hr TD 24 hr patch Place 1 patch on the skin over 24 hours every 24 hours, Starting Thu 4/20/2023, Normal      nicotine (NICODERM CQ) 21 mg/24 hr TD 24 hr patch Place 1 patch on the skin over 24 hours every 24 hours, Starting Mon 3/6/2023, Until Wed 4/5/2023, Normal      pantoprazole (PROTONIX) 40 mg tablet TAKE 1 TABLET (40 MG TOTAL) BY MOUTH DAILY, Starting Tue 3/7/2023, Until Thu 4/6/2023, Normal      sildenafil (VIAGRA) 50 MG tablet Take 1 tablet (50 mg total) by mouth daily, Starting Fri 3/24/2023, Normal             No discharge procedures on file.     PDMP Review       Value Time User    PDMP Reviewed  Yes 6/25/2023  4:17 AM Jose Mcgarry MD          ED Provider  Electronically Signed by           Zenia Zurita DO  08/11/23 1943

## 2023-09-04 NOTE — TELEPHONE ENCOUNTER
I'll call him when all labs are back  Thank you 
Patient's wife is calling in, she did set up Virtual Visit for 3/22/2021 w/ you   She is also requesting a call back in regards to regards to Labs
None

## 2023-09-14 ENCOUNTER — APPOINTMENT (OUTPATIENT)
Dept: LAB | Facility: HOSPITAL | Age: 38
End: 2023-09-14
Payer: COMMERCIAL

## 2023-09-14 DIAGNOSIS — R94.31 NONSPECIFIC ABNORMAL ELECTROCARDIOGRAM (ECG) (EKG): ICD-10-CM

## 2023-09-14 DIAGNOSIS — I45.81 LONG QT SYNDROME: ICD-10-CM

## 2023-09-15 LAB
ATRIAL RATE: 59 BPM
P AXIS: 43 DEGREES
PR INTERVAL: 150 MS
QRS AXIS: 67 DEGREES
QRSD INTERVAL: 86 MS
QT INTERVAL: 454 MS
QTC INTERVAL: 449 MS
T WAVE AXIS: 50 DEGREES
VENTRICULAR RATE: 59 BPM

## 2023-09-15 PROCEDURE — 93010 ELECTROCARDIOGRAM REPORT: CPT | Performed by: INTERNAL MEDICINE

## 2023-09-25 ENCOUNTER — TELEPHONE (OUTPATIENT)
Dept: GASTROENTEROLOGY | Facility: CLINIC | Age: 38
End: 2023-09-25

## 2023-09-25 ENCOUNTER — OFFICE VISIT (OUTPATIENT)
Dept: GASTROENTEROLOGY | Facility: CLINIC | Age: 38
End: 2023-09-25

## 2023-09-25 VITALS
WEIGHT: 210 LBS | HEIGHT: 71 IN | BODY MASS INDEX: 29.4 KG/M2 | DIASTOLIC BLOOD PRESSURE: 106 MMHG | SYSTOLIC BLOOD PRESSURE: 157 MMHG | HEART RATE: 77 BPM

## 2023-09-25 DIAGNOSIS — K92.1 BLACK STOOL: ICD-10-CM

## 2023-09-25 DIAGNOSIS — B19.20 HEPATITIS C VIRUS INFECTION WITHOUT HEPATIC COMA, UNSPECIFIED CHRONICITY: Primary | ICD-10-CM

## 2023-09-25 DIAGNOSIS — Z12.11 COLON CANCER SCREENING: ICD-10-CM

## 2023-09-25 DIAGNOSIS — R11.2 NAUSEA AND VOMITING, UNSPECIFIED VOMITING TYPE: ICD-10-CM

## 2023-09-25 DIAGNOSIS — R10.13 EPIGASTRIC PAIN: ICD-10-CM

## 2023-09-25 RX ORDER — PANTOPRAZOLE SODIUM 40 MG/1
40 TABLET, DELAYED RELEASE ORAL DAILY
Qty: 30 TABLET | Refills: 5 | Status: SHIPPED | OUTPATIENT
Start: 2023-09-25 | End: 2023-10-25

## 2023-09-25 RX ORDER — ONDANSETRON 4 MG/1
4 TABLET, FILM COATED ORAL EVERY 8 HOURS PRN
Qty: 60 TABLET | Refills: 1 | Status: SHIPPED | OUTPATIENT
Start: 2023-09-25

## 2023-09-25 NOTE — TELEPHONE ENCOUNTER
[facility] sent to pt with review of Hep C process. Spoke with pt, education initiated. Pt agreeable to bloodwork and imaging. Denies questions at this time.

## 2023-09-25 NOTE — PATIENT INSTRUCTIONS
Follow antireflux diet and measures-may have 1 to 2 cups of coffee or caffeinated tea daily only. Avoid caffeine, carbonated beverages, chocolate, fried fatty foods, and tomato based products. Not eat 3 hours before you go to bed. When you sleep at night sleep with your head elevated or multiple pillows. When you do eat during the day stay sitting upright for 1 hour afterwards. Advised to stop smoking.

## 2023-09-25 NOTE — PROGRESS NOTES
[facility] - Outpatient Consultation  Delbert Morales 41 y.o. male MRN: [id number]  Encounter: [id number]          ASSESSMENT AND PLAN:      1. Hepatitis C virus infection without hepatic coma, unspecified chronicity  Patient is positive for hepatitis C. Hepatitis C viral load positive; HCV PCR quantitative 1,040,000 and HCV quantitative log 6017   Done/10/2023. No genotype was obtained. CMP within normal limits no LFT elevation noted on lab work done 6/25/2023. Hemoglobin 15.2 done 6/25/2023. Patient reported previous history of IV drug use but no current use. -Message sent hepatic pool to obtain authorization for hepatitis C medication.  - HIV 1/2 AG/AB W REFLEX [facility] only; Future  - Hepatitis B surface antigen; Future  - Hepatitis B core antibody, total; Future  - Hepatitis B surface antibody; Future  - Hepatitis A antibody, total; Future  - Hepatitis C genotype; Future  - HCV FIBROSURE; Future  - HIV 1/2 AG/AB W REFLEX [facility] only  - Hepatitis B surface antigen  - Hepatitis B core antibody, total  - Hepatitis B surface antibody  - Hepatitis A antibody, total  - Hepatitis C genotype  - HCV FIBROSURE      2. Nausea and vomiting, unspecified vomiting type  3. Epigastric pain  Patient reports last episode of vomiting approximately 5 days ago. Patient also reports epigastric pain associated with nausea, vomiting, acid reflux, and heartburn. Patient is currently on no medication for symptoms. He reports that the symptoms have been ongoing for approximately the last 2 years. Patient was previously on pantoprazole but is no longer taking. Positive tenderness in epigastric area on examination. Differential diagnosis includes gastritis, esophagitis, lesion, H. pylori, or ulceration.    -Follow antireflux diet and measures  -Instructed to abstain from marijuana use  -Instructed to quit smoking  - EGD; schedule for EGD.   Prep and procedure explained to patient in detail. Further recommendations pending results of EGD.  - ondansetron (ZOFRAN) 4 mg tablet; Take 1 tablet (4 mg total) by mouth every 8 (eight) hours as needed for nausea or vomiting  Dispense: 60 tablet; Refill: 1  - pantoprazole (PROTONIX) 40 mg tablet; Take 1 tablet (40 mg total) by mouth daily Take half hour prior to breakfast  Dispense: 30 tablet; Refill: 5    4. Black stool  Patient patient reported 1 episode of black stool. No further episodes since. Patient denies any bright red blood in stool or bright red blood from rectal area. Further evaluation of black stool will be done at time EGD. Since patient only had 1 episode unlikely to be due to GI bleed. Patient denies any use of Pepto-Bismol. 5.  Colon cancer screening  Colon cancer screening due at age 40. Follow-up 6 weeks after procedure  ______________________________________________________________________    HPI:  Sary Wilkins is a 31-year-old male with past medical history of ADHD, methadone maintenance therapy for opiate addiction, psychiatric disorder, anxiety, and erectile dysfunction who presents to office as consult. Patient is positive for hepatitis C. Hepatitis C viral load positive; HCV PCR quantitative 1,040,000 and HCV quantitative log 6017   Done/10/2023. No genotype was obtained. CMP within normal limits no LFT elevation noted on lab work done 6/25/2023. Hemoglobin 15.2 done 6/25/2023. Patient reported previous history of IV drug use but no current use. Patient reports last episode of vomiting approximately 5 days ago. Patient also reports epigastric pain associated with nausea, vomiting, acid reflux, and heartburn. Patient is currently on no medication for symptoms. He reports that the symptoms have been ongoing for approximately the last 2 years. Patient was previously on pantoprazole but is no longer taking. Positive tenderness in epigastric area on examination.   Patient also reported 1 episode of black stool. Patient denies bright red blood in stool, bright red blood from rectal area, or change in bowel frequency. Bowel patterns are regular. Previous imaging ultrasound right upper quadrant showed hepatic steatosis. Normal gallbladder CT abdomen pelvis with IV contrast showed 2 mm nonobstructing left lower pole intrarenal calculus. Stomach and bowel unremarkable. Pancreas unremarkable. Liver is diffusely decreased in density consistent with fatty change. No CT evidence of suspicious hepatic mass. Patient does smoke tobacco.  Patient also uses marijuana daily. Patient reports previously not using marijuana for several months but he still continued with symptoms despite not using marijuana for months. Patient denies alcohol use but ethanol level 173 on 6/25/2023. REVIEW OF SYSTEMS:    CONSTITUTIONAL: Denies any fever, chills, rigors, and weight loss. HEENT: No earache or tinnitus. Denies hearing loss or visual disturbances. CARDIOVASCULAR: No chest pain or palpitations. RESPIRATORY: Denies any cough, hemoptysis, shortness of breath or dyspnea on exertion. GASTROINTESTINAL: As noted in the History of Present Illness. GENITOURINARY: No problems with urination. Denies any hematuria or dysuria. NEUROLOGIC: No dizziness or vertigo, denies headaches. MUSCULOSKELETAL: Denies any muscle or joint pain. SKIN: Denies skin rashes or itching. ENDOCRINE: Denies excessive thirst. Denies intolerance to heat or cold. PSYCHOSOCIAL: Denies depression or anxiety. Denies any recent memory loss.        Historical Information   Past Medical History:   Diagnosis Date   • Abscess of right hand 12/11/2020   • ADHD (attention deficit hyperactivity disorder)    • Hepatitis C    • Hypertension    • Inguinal hernia    • Laceration of right hand without foreign body 12/18/2020   • Methadone maintenance therapy patient ([address])    • Opiate addiction ([address])    • Psychiatric disorder     anxiety, depression, social personality diorder   • Renal calculus, bilateral 03/07/2017     History reviewed. No pertinent surgical history. Social History   Social History     Substance and Sexual Activity   Alcohol Use Yes    Comment: soc     Social History     Substance and Sexual Activity   Drug Use Yes   • Types: Marijuana     Social History     Tobacco Use   Smoking Status Every Day   • Packs/day: 0.50   • Types: Cigarettes   • Start date: 2001   Smokeless Tobacco Never     Family History   Problem Relation Age of Onset   • Alcohol abuse Mother    • Substance Abuse Mother    • Hypertension Mother    • Substance Abuse Father    • Alcohol abuse Father    • Heart disease Father    • Arthritis Father    • Mental illness Brother    • Dementia Maternal Grandfather    • Heart disease Maternal Grandfather    • Diabetes Maternal Grandfather    • Arthritis Maternal Grandfather        Meds/Allergies       Current Outpatient Medications:   •  albuterol (PROVENTIL HFA,VENTOLIN HFA) 90 mcg/act inhaler  •  methadone (DOLOPHINE) 10 mg/mL oral concentrated solution  •  ondansetron (ZOFRAN) 4 mg tablet  •  pantoprazole (PROTONIX) 40 mg tablet    Allergies   Allergen Reactions   • Acetaminophen Headache, Hives and Itching   • Gluten Meal - Food Allergy Abdominal Pain   • Soybean Oil - Food Allergy GI Intolerance           Objective     Blood pressure (!) 157/106, pulse 77, height 5' 11" (1.803 m), weight 95.3 kg (210 lb). Body mass index is 29.29 kg/m². PHYSICAL EXAM:      General Appearance:   Alert, cooperative, no distress   HEENT:   Normocephalic, atraumatic, anicteric.     Neck:  Supple, symmetrical, trachea midline   Lungs:   Clear to auscultation bilaterally; no rales, rhonchi or wheezing; respirations unlabored    Heart[de-identified]   Regular rate and rhythm; no murmur, rub, or gallop.    Abdomen:   Soft, tender in epigastric area with palpation, non-distended; normal bowel sounds; no masses, no organomegaly    Genitalia:   Deferred    Rectal:   Deferred    Extremities:  No cyanosis, clubbing or edema    Pulses:  2+ and symmetric    Skin:  No jaundice, rashes, or lesions    Lymph nodes:  No palpable cervical lymphadenopathy        Lab Results:   No visits with results within 1 Day(s) from this visit. Latest known visit with results is:   Appointment on 09/14/2023   Component Date Value   • Ventricular Rate 09/14/2023 59    • Atrial Rate 09/14/2023 59    • DC Interval 09/14/2023 150    • QRSD Interval 09/14/2023 86    • QT Interval 09/14/2023 454    • QTC Interval 09/14/2023 449    • P Axis 09/14/2023 43    • QRS Axis 09/14/2023 67    • T Wave Axis 09/14/2023 50          Radiology Results:   No results found.

## 2023-09-25 NOTE — TELEPHONE ENCOUNTER
----- Message from [facility] [employer] sent at 9/25/2023  8:44 AM EDT -----  Regarding: Hep C medication  Patient seen in office today has positive hepatitis C viral load. Other blood work ordered. Please obtain authorization from insurance company once blood work is completed for hepatitis C medication. If you need any additional blood work ordered please let patient know. I believe I ordered everything that was needed.   Thank you

## 2023-09-25 NOTE — TELEPHONE ENCOUNTER
----- Message from [facility] [employer] sent at 9/25/2023  8:44 AM EDT -----  Regarding: Hep C medication  Patient seen in office today has positive hepatitis C viral load. Other blood work ordered. Please obtain authorization from insurance company once blood work is completed for hepatitis C medication. If you need any additional blood work ordered please let patient know. I believe I ordered everything that was needed.   Thank you

## 2023-09-27 ENCOUNTER — TELEPHONE (OUTPATIENT)
Dept: FAMILY MEDICINE CLINIC | Facility: CLINIC | Age: 38
End: 2023-09-27

## 2023-09-27 NOTE — TELEPHONE ENCOUNTER
Refill request from [facility]  HYDROXYZINE LUIS A 25MG  QTY 30  REFILLS 1  LAST FILLED 7/2/23    I do not see this medication in the list

## 2023-09-29 NOTE — TELEPHONE ENCOUNTER
Called pt. He confirmed he did not request that. He confirmed he is going to [facility]. Pt has not been seen since PCP was removed by [facility]. Dr Patience Gosselin was added in error.

## 2023-10-09 ENCOUNTER — TELEPHONE (OUTPATIENT)
Age: 38
End: 2023-10-09

## 2023-10-09 NOTE — TELEPHONE ENCOUNTER
Scheduled date of EGD(as of today): 10/26/2023    Physician performing EGD: Anup Christensen    Location of EGD: [facility]    Pt rescheduled same day procedure from 10/09/2023 due to accidentally eating breakfast.

## 2023-10-25 RX ORDER — SODIUM CHLORIDE, SODIUM LACTATE, POTASSIUM CHLORIDE, CALCIUM CHLORIDE 600; 310; 30; 20 MG/100ML; MG/100ML; MG/100ML; MG/100ML
75 INJECTION, SOLUTION INTRAVENOUS CONTINUOUS
Status: CANCELLED | OUTPATIENT
Start: 2023-10-25

## 2023-10-26 ENCOUNTER — TELEPHONE (OUTPATIENT)
Age: 38
End: 2023-10-26

## 2023-10-26 ENCOUNTER — ANESTHESIA EVENT (OUTPATIENT)
Dept: ANESTHESIOLOGY | Facility: HOSPITAL | Age: 38
End: 2023-10-26

## 2023-10-26 ENCOUNTER — ANESTHESIA (OUTPATIENT)
Dept: ANESTHESIOLOGY | Facility: HOSPITAL | Age: 38
End: 2023-10-26

## 2023-11-20 ENCOUNTER — ANESTHESIA EVENT (OUTPATIENT)
Dept: ANESTHESIOLOGY | Facility: HOSPITAL | Age: 38
End: 2023-11-20

## 2023-11-20 ENCOUNTER — ANESTHESIA (OUTPATIENT)
Dept: ANESTHESIOLOGY | Facility: HOSPITAL | Age: 38
End: 2023-11-20

## 2023-11-24 PROBLEM — Z12.11 COLON CANCER SCREENING: Status: RESOLVED | Noted: 2023-09-25 | Resolved: 2023-11-24

## 2023-11-28 ENCOUNTER — TELEPHONE (OUTPATIENT)
Dept: GASTROENTEROLOGY | Facility: CLINIC | Age: 38
End: 2023-11-28

## 2023-12-05 ENCOUNTER — HOSPITAL ENCOUNTER (OUTPATIENT)
Dept: GASTROENTEROLOGY | Facility: AMBULARY SURGERY CENTER | Age: 38
Setting detail: OUTPATIENT SURGERY
Discharge: HOME | End: 2023-12-05
Attending: INTERNAL MEDICINE
Payer: COMMERCIAL

## 2023-12-05 ENCOUNTER — ANESTHESIA EVENT (OUTPATIENT)
Dept: GASTROENTEROLOGY | Facility: AMBULARY SURGERY CENTER | Age: 38
End: 2023-12-05

## 2023-12-05 ENCOUNTER — ANESTHESIA (OUTPATIENT)
Dept: GASTROENTEROLOGY | Facility: AMBULARY SURGERY CENTER | Age: 38
End: 2023-12-05

## 2023-12-05 VITALS
HEART RATE: 87 BPM | DIASTOLIC BLOOD PRESSURE: 81 MMHG | SYSTOLIC BLOOD PRESSURE: 132 MMHG | RESPIRATION RATE: 18 BRPM | OXYGEN SATURATION: 97 % | TEMPERATURE: 98.2 F

## 2023-12-05 DIAGNOSIS — R10.13 EPIGASTRIC PAIN: ICD-10-CM

## 2023-12-05 DIAGNOSIS — R11.2 NAUSEA AND VOMITING, UNSPECIFIED VOMITING TYPE: ICD-10-CM

## 2023-12-05 PROBLEM — IMO0001 SMOKING: Status: ACTIVE | Noted: 2023-12-05

## 2023-12-05 PROBLEM — F17.200 SMOKING: Status: ACTIVE | Noted: 2023-12-05

## 2023-12-05 PROBLEM — J45.909 ASTHMA: Status: ACTIVE | Noted: 2023-12-05

## 2023-12-05 PROCEDURE — 43239 EGD BIOPSY SINGLE/MULTIPLE: CPT | Performed by: INTERNAL MEDICINE

## 2023-12-05 PROCEDURE — 88305 TISSUE EXAM BY PATHOLOGIST: CPT | Performed by: PATHOLOGY

## 2023-12-05 RX ORDER — SODIUM CHLORIDE, SODIUM LACTATE, POTASSIUM CHLORIDE, CALCIUM CHLORIDE 600; 310; 30; 20 MG/100ML; MG/100ML; MG/100ML; MG/100ML
INJECTION, SOLUTION INTRAVENOUS CONTINUOUS PRN
Status: DISCONTINUED | OUTPATIENT
Start: 2023-12-05 | End: 2023-12-05

## 2023-12-05 RX ORDER — PROPOFOL 10 MG/ML
INJECTION, EMULSION INTRAVENOUS AS NEEDED
Status: DISCONTINUED | OUTPATIENT
Start: 2023-12-05 | End: 2023-12-05

## 2023-12-05 RX ORDER — SODIUM CHLORIDE, SODIUM LACTATE, POTASSIUM CHLORIDE, CALCIUM CHLORIDE 600; 310; 30; 20 MG/100ML; MG/100ML; MG/100ML; MG/100ML
75 INJECTION, SOLUTION INTRAVENOUS CONTINUOUS
Status: DISCONTINUED | OUTPATIENT
Start: 2023-12-05 | End: 2023-12-09 | Stop reason: HOSPADM

## 2023-12-05 RX ORDER — LIDOCAINE HYDROCHLORIDE 10 MG/ML
INJECTION, SOLUTION EPIDURAL; INFILTRATION; INTRACAUDAL; PERINEURAL AS NEEDED
Status: DISCONTINUED | OUTPATIENT
Start: 2023-12-05 | End: 2023-12-05

## 2023-12-05 RX ADMIN — PROPOFOL 150 MG: 10 INJECTION, EMULSION INTRAVENOUS at 10:05

## 2023-12-05 RX ADMIN — PROPOFOL 50 MG: 10 INJECTION, EMULSION INTRAVENOUS at 10:06

## 2023-12-05 RX ADMIN — LIDOCAINE HYDROCHLORIDE 50 MG: 10 INJECTION, SOLUTION EPIDURAL; INFILTRATION; INTRACAUDAL; PERINEURAL at 10:05

## 2023-12-05 RX ADMIN — SODIUM CHLORIDE, SODIUM LACTATE, POTASSIUM CHLORIDE, AND CALCIUM CHLORIDE: .6; .31; .03; .02 INJECTION, SOLUTION INTRAVENOUS at 10:02

## 2023-12-05 NOTE — H&P
History and Physical - [facility]  Miguel Apodaca 41 y.o. male MRN: [id number]                  HPI: Miguel Apodaca is a 41y.o. year old male who presents for nausea, vomiting, abdominal pain, black stool      REVIEW OF SYSTEMS: Per the HPI, and otherwise unremarkable. Historical Information   Past Medical History:   Diagnosis Date    Abscess of right hand 12/11/2020    ADHD (attention deficit hyperactivity disorder)     Hepatitis C     Hypertension     Inguinal hernia     Laceration of right hand without foreign body 12/18/2020    Methadone maintenance therapy patient ([address])     Opiate addiction ([address])     Psychiatric disorder     anxiety, depression, social personality diorder    Renal calculus, bilateral 03/07/2017     No past surgical history on file.   Social History   Social History     Substance and Sexual Activity   Alcohol Use Yes    Comment: soc     Social History     Substance and Sexual Activity   Drug Use Yes    Types: Marijuana     Social History     Tobacco Use   Smoking Status Every Day    Packs/day: 0.50    Types: Cigarettes    Start date: 2001   Smokeless Tobacco Never     Family History   Problem Relation Age of Onset    Alcohol abuse Mother     Substance Abuse Mother     Hypertension Mother     Substance Abuse Father     Alcohol abuse Father     Heart disease Father     Arthritis Father     Mental illness Brother     Dementia Maternal Grandfather     Heart disease Maternal Grandfather     Diabetes Maternal Grandfather     Arthritis Maternal Grandfather        Meds/Allergies       Current Outpatient Medications:     albuterol (PROVENTIL HFA,VENTOLIN HFA) 90 mcg/act inhaler    diphenhydrAMINE (BENADRYL) 50 MG tablet    methadone (DOLOPHINE) 10 mg/mL oral concentrated solution    ondansetron (ZOFRAN) 4 mg tablet    pantoprazole (PROTONIX) 40 mg tablet    Current Facility-Administered Medications:     lactated ringers infusion, 75 mL/hr, Intravenous, Continuous    Allergies Allergen Reactions    Acetaminophen Headache, Hives and Itching    Gluten Meal - Food Allergy Abdominal Pain    Soybean Oil - Food Allergy GI Intolerance       Objective     /90   Pulse 69   Temp 98.2 °F (36.8 °C) (Temporal)   Resp 18   SpO2 93%       PHYSICAL EXAM    Gen: NAD  Head: NCAT  CV: RRR  CHEST: Clear  ABD: soft, NT/ND  EXT: no edema      ASSESSMENT/PLAN:  This is a 41y.o. year old male here for EGD, and he is stable and optimized for his procedure.

## 2023-12-05 NOTE — ANESTHESIA PREPROCEDURE EVALUATION
Procedure:  EGD    Relevant Problems   GI/HEPATIC   (+) Gastroesophageal reflux disease   (+) Hepatitis C virus infection without hepatic coma   (+) Hiatal hernia      MUSCULOSKELETAL   (+) Hiatal hernia      NEURO/PSYCH   (+) Anxiety      PULMONARY   (+) Asthma   (+) Smoking        Physical Exam    Airway    Mallampati score: II  TM Distance: >3 FB  Neck ROM: full     Dental       Cardiovascular  Rhythm: regular, Rate: normal    Pulmonary   Breath sounds clear to auscultation    Other Findings        Anesthesia Plan  ASA Score- 2     Anesthesia Type- IV sedation with anesthesia with ASA Monitors. Additional Monitors:     Airway Plan:            Plan Factors-    Chart reviewed. Patient is a current smoker. Patient instructed to abstain from smoking on day of procedure. Patient smoked on day of surgery. Induction- intravenous. Postoperative Plan-     Informed Consent- Anesthetic plan and risks discussed with patient. I personally reviewed this patient with the CRNA. Discussed and agreed on the Anesthesia Plan with the CRNA. Francis Valenzuela

## 2023-12-05 NOTE — ANESTHESIA POSTPROCEDURE EVALUATION
Post-Op Assessment Note    CV Status:  Stable  Pain Score: 0    Pain management: adequate       Mental Status:  Alert and awake   Hydration Status:  Euvolemic   PONV Controlled:  Controlled   Airway Patency:  Patent     Post Op Vitals Reviewed: Yes      Staff: CRNA               BP   117/68   Temp      Pulse  56   Resp   17   SpO2   99%

## 2023-12-07 PROCEDURE — 88305 TISSUE EXAM BY PATHOLOGIST: CPT | Performed by: PATHOLOGY

## 2023-12-12 ENCOUNTER — TELEPHONE (OUTPATIENT)
Dept: FAMILY MEDICINE CLINIC | Facility: CLINIC | Age: 38
End: 2023-12-12

## 2023-12-12 NOTE — TELEPHONE ENCOUNTER
Pt called needing social security letter. Created letter. Clerical- can you please print the letter I created, have an attending sign and put in  bin for .

## 2024-04-30 DIAGNOSIS — Z00.6 ENCOUNTER FOR EXAMINATION FOR NORMAL COMPARISON OR CONTROL IN CLINICAL RESEARCH PROGRAM: ICD-10-CM

## 2025-03-11 ENCOUNTER — NURSE TRIAGE (OUTPATIENT)
Age: 40
End: 2025-03-11

## 2025-03-11 DIAGNOSIS — K21.9 GASTROESOPHAGEAL REFLUX DISEASE, UNSPECIFIED WHETHER ESOPHAGITIS PRESENT: Primary | ICD-10-CM

## 2025-03-11 RX ORDER — PANTOPRAZOLE SODIUM 40 MG/1
40 TABLET, DELAYED RELEASE ORAL 2 TIMES DAILY
Qty: 60 TABLET | Refills: 5 | Status: SHIPPED | OUTPATIENT
Start: 2025-03-11

## 2025-03-11 NOTE — TELEPHONE ENCOUNTER
Regarding: heart burn,throat pain and dysphagia  ----- Message from Merrill TRUJILLO sent at 3/11/2025  1:59 PM EDT -----  Pt's wife Aleja (no consent on file) calling to book an appt for Pt at [place] for a THURSDAY. He is established with  but have not been in office since 2023, Ana Maria offered my first available but it is not til July 10th and she says he can't wait that long as he is struggling with symptoms of frequent and strong heart burn, Problems with hiatal hernia, throat pain and dysphagia. Ana Maria booked him in and added him to the [facility] but is very worried and would like him seen sooner if possible.

## 2025-03-11 NOTE — TELEPHONE ENCOUNTER
I contacted patient's wife Aleja and informed her that since no current communication sheet on file that I would need to speak directly with patient. She asked that I call his mobile #[phone].   Reason for Disposition  • The patient is on PPI once a day with continued epigastric discomfort, reflux, regurgitation    Answer Assessment - Initial Assessment Questions  1. Do you have a history of GERD?  yes  2. When did your symptoms start? Please describe your symptoms and how often you experience these symptoms.  Started years ago, worsening since two weeks ago  3. Are you taking any acid reducing medications currently such as: Prilosec (Omeprazole), Protonix (Pantoprazole), Prevacid (Lansoprazole), Nexium (Esomeprazole), Aciphex (Rabeprazole), Dexilant (Dexlansoprazole)?  Pantoprazole 40 mg in am  4. Have you tried any OTC acid reducing medications? (If so, which medications have you tried?) Zantac (Ranitidine), Pepcid (Famotidine), Tagamet (Cimetidine) Tums, Rolaids, Maalox, Mylanta.  TUMS only relieves symptoms for few minutes  5. What was the outcome of taking the medications which you have for these symptoms?  Not effective  6. Have you experienced any recent changes in your bowel habits?  denies  7. Have you had any new life stressors or diet changes?   Job stressful, no dietary symptoms  8. Does anything make your symptoms better?  Benadryl helps symptoms  9. Have you had any recent blood work, imaging, or procedures done? If yes, what were those?   denies    Protocols used: GI-[facility]-ADULT-[place]

## 2025-03-24 ENCOUNTER — TELEPHONE (OUTPATIENT)
Age: 40
End: 2025-03-24

## 2025-03-24 NOTE — TELEPHONE ENCOUNTER
Left 3 messages for patient that there was a change in schedule and I had to reschedule his 7/10 appointment with Lorraine. Cancelled appointment and will send letter to patient asking him to call back and reschedule.